# Patient Record
Sex: FEMALE | Race: WHITE | NOT HISPANIC OR LATINO | Employment: PART TIME | URBAN - METROPOLITAN AREA
[De-identification: names, ages, dates, MRNs, and addresses within clinical notes are randomized per-mention and may not be internally consistent; named-entity substitution may affect disease eponyms.]

---

## 2017-03-23 ENCOUNTER — TELEPHONE (OUTPATIENT)
Dept: OBGYN | Facility: CLINIC | Age: 42
End: 2017-03-23

## 2017-03-23 ENCOUNTER — OFFICE VISIT (OUTPATIENT)
Dept: FAMILY MEDICINE | Facility: CLINIC | Age: 42
End: 2017-03-23

## 2017-03-23 VITALS
OXYGEN SATURATION: 100 % | WEIGHT: 113 LBS | BODY MASS INDEX: 20.67 KG/M2 | TEMPERATURE: 98 F | HEART RATE: 107 BPM | DIASTOLIC BLOOD PRESSURE: 96 MMHG | SYSTOLIC BLOOD PRESSURE: 137 MMHG

## 2017-03-23 DIAGNOSIS — N30.00 ACUTE CYSTITIS WITHOUT HEMATURIA: Primary | ICD-10-CM

## 2017-03-23 DIAGNOSIS — R30.0 DYSURIA: ICD-10-CM

## 2017-03-23 LAB
BILIRUBIN UR: NEGATIVE
BLOOD UR: ABNORMAL
GLUCOSE URINE: NEGATIVE
KETONES UR QL: NEGATIVE
LEUKOCYTE ESTERASE UR: ABNORMAL
NITRITE UR QL STRIP: NEGATIVE
PH UR STRIP: 6 [PH] (ref 5–7)
PROTEIN UR: NEGATIVE
SP GR UR STRIP: 1.01
UROBILINOGEN UR STRIP-ACNC: ABNORMAL

## 2017-03-23 RX ORDER — SULFAMETHOXAZOLE/TRIMETHOPRIM 800-160 MG
1 TABLET ORAL 2 TIMES DAILY
Qty: 6 TABLET | Refills: 0 | Status: SHIPPED | OUTPATIENT
Start: 2017-03-23 | End: 2017-04-06

## 2017-03-23 RX ORDER — PHENAZOPYRIDINE HYDROCHLORIDE 200 MG/1
200 TABLET, FILM COATED ORAL 3 TIMES DAILY PRN
Qty: 6 TABLET | Refills: 0 | Status: SHIPPED | OUTPATIENT
Start: 2017-03-23 | End: 2017-05-18

## 2017-03-23 ASSESSMENT — PAIN SCALES - GENERAL: PAINLEVEL: NO PAIN (0)

## 2017-03-23 NOTE — PATIENT INSTRUCTIONS
You have been prescribed an antibiotic to treat a bacterial infection. Watch for signs of allergic reaction including swelling around the mouth or eyes and the development of a rash.  If you develop any of these symptoms, stop your medication, take a benadryl (25-50 mg) and give our office a call. Consider probiotic therapy to decrease the possibility of diarrhea associated with antibiotic use.    Drink lots of fluids.    Please feel free to call our office if you have any questions.  Thank you for allowing me to participate in your care.  It was my pleasure meeting you.  Ashley Sanchez PA-C    Urinary Tract Infection in Women   What is a urinary tract infection?   A urinary tract infection (UTI) is a bacterial infection in the urinary tract. The urinary tract includes the kidneys, ureters, bladder, and urethra. Any or all of these parts of the urinary tract can become infected. If left untreated, UTI can cause permanent damage to the bladder and kidneys.   How does it occur?   Urinary tract infection is the result of bacteria that multiply and spread. These bacteria can cause:   cystitis (bladder infection)   pyelonephritis (kidney infection)   urethritis (inflammation of the urethra, the tube that drains urine from the bladder).   Normally the urine does not have any bacteria or any other organisms in it. Bacteria that cause UTI often spread from the rectum or vagina to the urethra and then to the bladder or kidneys. Urinary tract infection is more common in women because the urethra is short. This makes it easy for bacteria to move up to the bladder and kidneys. Sometimes bacteria can also spread from another part of the body through the bloodstream to the urinary tract.   An obstruction in the urinary tract, such as a stone, can keep the urine from getting to the bladder and lead to an infection. Urinary tract infection is more likely to occur if you have diabetes or another disorder that affects the immune  system. Many women seem to have more infections after sexual intercourse. As you get older, it can be harder to empty the bladder completely. If the urine stays in the bladder, the few bacteria that get into the bladder can start growing and start an infection. Often the cause of UTI is not known.   Urinary tract infection is more likely to occur in women who:   are newly sexually active or have a new sexual partner   are past menopause   are pregnant   have a history of diabetes, sickle-cell anemia, stroke, kidney stones, or any illness that causes the bladder to be paralyzed or unable to empty completely.   What are the symptoms?   The symptoms of UTI may include:   urinating more often   feeling an urgent need to urinate   pain or discomfort (burning) when you urinate   strong-smelling urine   pain in the lower pelvis, stomach, lower back, or side   urine that looks cloudy or reddish   shaking chills   fever   sweats   nausea and vomiting   leaking of urine (incontinence)   change in amount of urine, either more or less   pain during sexual intercourse.   How is it diagnosed?   Your health care provider will review your symptoms and examine you. The exam may include a pelvic exam. Your provider will check for tenderness of the bladder or kidney area of your back. A sample of your urine may be tested for bacteria and pus.   For repeated infections or symptoms that continue after treatment, your health care provider may suggest:   An intravenous pyelogram (IVP). An IVP is a special type of x-ray of the kidneys, ureters, and bladder.   An ultrasound scan to look at the urinary tract.   A cystoscopy. This is an exam of the inside of the bladder with a small lighted instrument. It is usually performed by a specialist called a urologist.   How is it treated?   UTIs are treated with an antibiotic. For uncomplicated urinary tract infections you may take a single dose of an antibiotic or you may take an antibiotic for 3  to 10 days. For chronic infections or infections that keep coming back, it may be necessary to take the antibiotics for a longer time. Take all the medicine your health care provider prescribes, even after the symptoms go away. If you stop taking your medicine before the scheduled end of treatment, the infection may come back.   Your health care provider can prescribe a medicine called Pyridium to relieve painful urination while the antibiotics are treating the infection. (This medicine turns your urine a dark orange color.)   If the infection is not treated, your kidneys may be damaged or the infection may spread to your blood. If the infection does spread to the blood, it can be fatal. If you have a severe kidney infection, you will be given IV antibiotics through your veins and you may have to stay in the hospital for a few days.   How long will the effects last?   Usually the symptoms of the infection stop in 2 to 3 days.   How can I take care of myself?   Follow your health care provider's treatment.   Drink more fluids, especially water, to help flush the bacteria from your system.   If you have a fever:   Rest if you have a fever above 100 degrees F (38 degrees C). After your temperature falls below 100 degrees F (38 degrees C), you may be more active.   Take aspirin or acetaminophen to control the fever.   Keep a daily record of your temperature.   A hot water bottle or an electric heating pad on a low setting can help relieve cramps or lower abdominal or back pain.   Soaking in a tub for 20 to 30 minutes may help relieve any back or abdominal pain.   If your symptoms continue for 2 or more days, or if you develop new symptoms, call your health care provider.   How can I help prevent a urinary tract infection?   You can help prevent UTIs if you:   Drink plenty of water and other noncaffeinated drinks. (Caffeine can cause the body to lose fluids.)   Do not delay urinating when you feel the need to urinate.    Use good hygiene when you use the toilet. For example, wipe from front to back to keep rectal bacteria from getting into the vagina and urethra.   Avoid using irritating cosmetics or chemicals in the area of the vagina and urethra (such as strong soaps, feminine hygiene sprays or douches, or scented napkins or panty liners).   Urinate soon after sexual intercourse.   Keep your genital area clean.   Empty your bladder completely when you urinate.   Wear all-cotton or cotton-crotch underwear and pantyhose. Change underwear and pantyhose every day.   Copyright   2006 [employer] and/or one of its subsidiaries. All Rights Reserved.

## 2017-03-23 NOTE — MR AVS SNAPSHOT
After Visit Summary   3/23/2017    Kitty Ibarra    MRN: [id number]           Patient Information     Date Of Birth          1975        Visit Information        Provider Department      3/23/2017 10:20 AM Marii Sanchez PA-C [facility]        Today's Diagnoses     Acute cystitis without hematuria    -  1    Dysuria          Care Instructions    You have been prescribed an antibiotic to treat a bacterial infection. Watch for signs of allergic reaction including swelling around the mouth or eyes and the development of a rash.  If you develop any of these symptoms, stop your medication, take a benadryl (25-50 mg) and give our office a call. Consider probiotic therapy to decrease the possibility of diarrhea associated with antibiotic use.    Drink lots of fluids.    Please feel free to call our office if you have any questions.  Thank you for allowing me to participate in your care.  It was my pleasure meeting you.  Ashley Sanchez PA-C    Urinary Tract Infection in Women   What is a urinary tract infection?   A urinary tract infection (UTI) is a bacterial infection in the urinary tract. The urinary tract includes the kidneys, ureters, bladder, and urethra. Any or all of these parts of the urinary tract can become infected. If left untreated, UTI can cause permanent damage to the bladder and kidneys.   How does it occur?   Urinary tract infection is the result of bacteria that multiply and spread. These bacteria can cause:   cystitis (bladder infection)   pyelonephritis (kidney infection)   urethritis (inflammation of the urethra, the tube that drains urine from the bladder).   Normally the urine does not have any bacteria or any other organisms in it. Bacteria that cause UTI often spread from the rectum or vagina to the urethra and then to the bladder or kidneys. Urinary tract infection is more common in women because the urethra is short. This makes it easy for bacteria to  move up to the bladder and kidneys. Sometimes bacteria can also spread from another part of the body through the bloodstream to the urinary tract.   An obstruction in the urinary tract, such as a stone, can keep the urine from getting to the bladder and lead to an infection. Urinary tract infection is more likely to occur if you have diabetes or another disorder that affects the immune system. Many women seem to have more infections after sexual intercourse. As you get older, it can be harder to empty the bladder completely. If the urine stays in the bladder, the few bacteria that get into the bladder can start growing and start an infection. Often the cause of UTI is not known.   Urinary tract infection is more likely to occur in women who:   are newly sexually active or have a new sexual partner   are past menopause   are pregnant   have a history of diabetes, sickle-cell anemia, stroke, kidney stones, or any illness that causes the bladder to be paralyzed or unable to empty completely.   What are the symptoms?   The symptoms of UTI may include:   urinating more often   feeling an urgent need to urinate   pain or discomfort (burning) when you urinate   strong-smelling urine   pain in the lower pelvis, stomach, lower back, or side   urine that looks cloudy or reddish   shaking chills   fever   sweats   nausea and vomiting   leaking of urine (incontinence)   change in amount of urine, either more or less   pain during sexual intercourse.   How is it diagnosed?   Your health care provider will review your symptoms and examine you. The exam may include a pelvic exam. Your provider will check for tenderness of the bladder or kidney area of your back. A sample of your urine may be tested for bacteria and pus.   For repeated infections or symptoms that continue after treatment, your health care provider may suggest:   An intravenous pyelogram (IVP). An IVP is a special type of x-ray of the kidneys, ureters, and bladder.    An ultrasound scan to look at the urinary tract.   A cystoscopy. This is an exam of the inside of the bladder with a small lighted instrument. It is usually performed by a specialist called a urologist.   How is it treated?   UTIs are treated with an antibiotic. For uncomplicated urinary tract infections you may take a single dose of an antibiotic or you may take an antibiotic for 3 to 10 days. For chronic infections or infections that keep coming back, it may be necessary to take the antibiotics for a longer time. Take all the medicine your health care provider prescribes, even after the symptoms go away. If you stop taking your medicine before the scheduled end of treatment, the infection may come back.   Your health care provider can prescribe a medicine called Pyridium to relieve painful urination while the antibiotics are treating the infection. (This medicine turns your urine a dark orange color.)   If the infection is not treated, your kidneys may be damaged or the infection may spread to your blood. If the infection does spread to the blood, it can be fatal. If you have a severe kidney infection, you will be given IV antibiotics through your veins and you may have to stay in the hospital for a few days.   How long will the effects last?   Usually the symptoms of the infection stop in 2 to 3 days.   How can I take care of myself?   Follow your health care provider's treatment.   Drink more fluids, especially water, to help flush the bacteria from your system.   If you have a fever:   Rest if you have a fever above 100 degrees F (38 degrees C). After your temperature falls below 100 degrees F (38 degrees C), you may be more active.   Take aspirin or acetaminophen to control the fever.   Keep a daily record of your temperature.   A hot water bottle or an electric heating pad on a low setting can help relieve cramps or lower abdominal or back pain.   Soaking in a tub for 20 to 30 minutes may help relieve any  back or abdominal pain.   If your symptoms continue for 2 or more days, or if you develop new symptoms, call your health care provider.   How can I help prevent a urinary tract infection?   You can help prevent UTIs if you:   Drink plenty of water and other noncaffeinated drinks. (Caffeine can cause the body to lose fluids.)   Do not delay urinating when you feel the need to urinate.   Use good hygiene when you use the toilet. For example, wipe from front to back to keep rectal bacteria from getting into the vagina and urethra.   Avoid using irritating cosmetics or chemicals in the area of the vagina and urethra (such as strong soaps, feminine hygiene sprays or douches, or scented napkins or panty liners).   Urinate soon after sexual intercourse.   Keep your genital area clean.   Empty your bladder completely when you urinate.   Wear all-cotton or cotton-crotch underwear and pantyhose. Change underwear and pantyhose every day.   Copyright   2006 [employer] and/or one of its subsidiaries. All Rights Reserved.             Follow-ups after your visit        Your next 10 appointments already scheduled     May 18, 2017 11:00 AM CDT   Annual Visit with Omaira Siddiqui MD   [facility] ([facility])    [facility] [address] [facility] [address] [facility] [address] [phone]              Who to contact     Please call your clinic at [phone] to:    Ask questions about your health    Make or cancel appointments    Discuss your medicines    Learn about your test results    Speak to your doctor   If you have compliments or concerns about an experience at your clinic, or if you wish to file a complaint, please contact [facility] Patient Relations at [phone] or email us at [email or web address]         Additional Information About Your Visit        MyChart Information     [employer] gives you secure  access to your electronic health record. If you see a primary care provider, you can also send messages to your care team and make appointments. If you have questions, please call your primary care clinic.  If you do not have a primary care provider, please call [phone] and they will assist you.      [employer] is an electronic gateway that provides easy, online access to your medical records. With [employer], you can request a clinic appointment, read your test results, renew a prescription or communicate with your care team.     To access your existing account, please contact your [facility] or call [phone] for assistance.        Care EveryWhere ID     This is your Care EveryWhere ID. This could be used by other organizations to access your [place] medical records  [id number]        Your Vitals Were     Pulse Temperature Last Period Pulse Oximetry BMI (Body Mass Index)       107 98  F (36.7  C) (Oral) 03/01/2017 100% 20.67 kg/m2        Blood Pressure from Last 3 Encounters:   03/23/17 (!) 137/96   11/11/14 138/88   10/13/14 122/77    Weight from Last 3 Encounters:   03/23/17 113 lb (51.3 kg)   02/10/15 112 lb (50.8 kg)   11/11/14 107 lb (48.5 kg)              We Performed the Following     Urinalysis ([place])     Urine Culture Aerobic Bacterial          Today's Medication Changes          These changes are accurate as of: 3/23/17 10:53 AM.  If you have any questions, ask your nurse or doctor.               Start taking these medicines.        Dose/Directions    phenazopyridine 200 MG tablet   Commonly known as:  PYRIDIUM   Used for:  Acute cystitis without hematuria   Started by:  Marii Sanchez PA-C        Dose:  200 mg   Take 1 tablet (200 mg) by mouth 3 times daily as needed for irritation   Quantity:  6 tablet   Refills:  0       sulfamethoxazole-trimethoprim 800-160 MG per tablet   Commonly known as:  BACTRIM DS/SEPTRA DS   Used for:  Acute cystitis without  hematuria   Started by:  Marii Sanchez PA-C        Dose:  1 tablet   Take 1 tablet by mouth 2 times daily   Quantity:  6 tablet   Refills:  0            Where to get your medicines      These medications were sent to [place] [address] [facility] [address]  [address], [place] [address]     Phone:  [phone]     phenazopyridine 200 MG tablet    sulfamethoxazole-trimethoprim 800-160 MG per tablet                Primary Care Provider Office Phone # Fax #    Noel Mayfield [phone]       [facility]  [address] [facility] [address]  [facility] [address]        Thank you!     Thank you for choosing [facility]  for your care. Our goal is always to provide you with excellent care. Hearing back from our patients is one way we can continue to improve our services. Please take a few minutes to complete the written survey that you may receive in the mail after your visit with us. Thank you!             Your Updated Medication List - Protect others around you: Learn how to safely use, store and throw away your medicines at www.disposemymeds.org.          This list is accurate as of: 3/23/17 10:53 AM.  Always use your most recent med list.                   Brand Name Dispense Instructions for use    phenazopyridine 200 MG tablet    PYRIDIUM    6 tablet    Take 1 tablet (200 mg) by mouth 3 times daily as needed for irritation       sulfamethoxazole-trimethoprim 800-160 MG per tablet    BACTRIM DS/SEPTRA DS    6 tablet    Take 1 tablet by mouth 2 times daily       vitamin D 2000 UNITS Caps      Take 2 capsules by mouth daily.

## 2017-03-23 NOTE — NURSING NOTE
Chief Complaint   Patient presents with     Dysuria     Frequency     41 year old      Blood pressure (!) 137/96, pulse 107, temperature 98  F (36.7  C), temperature source Oral, weight 113 lb (51.3 kg), last menstrual period 03/01/2017, SpO2 100 %. Body mass index is 20.67 kg/(m^2).    Wt Readings from Last 2 Encounters:   03/23/17 113 lb (51.3 kg)   02/10/15 112 lb (50.8 kg)     BP Readings from Last 3 Encounters:   03/23/17 (!) 137/96   11/11/14 138/88   10/13/14 122/77       Patient Active Problem List   Diagnosis     Skin exam, screening for cancer     Myopia     Vitamin D deficiency       Current Outpatient Prescriptions   Medication Sig Dispense Refill     Cholecalciferol (VITAMIN D) 2000 UNITS CAPS Take 2 capsules by mouth daily.         Social History   Substance Use Topics     Smoking status: Never Smoker     Smokeless tobacco: Never Used     Alcohol use No         There are no preventive care reminders to display for this patient.    JENNIFER NGUYEN CMA  March 23, 2017 10:30 AM

## 2017-03-23 NOTE — PROGRESS NOTES
SUBJECTIVE:  Kitty Ibarra is a 41 year old female who  presents today for a possible UTI. Symptoms of dysuria, urgency, frequency and burning have been going on for 2day(s).  Hematuria no.  Sudden onset and moderate symptoms.  There is no history of fever, chills, nausea or vomiting.  No history of vaginal or penile discharge. This patient does not have a history of urinary tract infections. Patient denies long duration, rigors, flank pain and temperature > 101 degrees F. or vaginal discharge     Patient Active Problem List    Diagnosis Date Noted     Vitamin D deficiency 11/11/2014     Priority: Medium     Myopia 10/14/2014     Priority: Medium     Skin exam, screening for cancer 02/05/2013     Priority: Medium       Past Medical History:   Diagnosis Date     NO ACTIVE PROBLEMS      Current Outpatient Prescriptions   Medication Sig Dispense Refill     Cholecalciferol (VITAMIN D) 2000 UNITS CAPS Take 2 capsules by mouth daily.       Social History   Substance Use Topics     Smoking status: Never Smoker     Smokeless tobacco: Never Used     Alcohol use No       ROS:   CONSTITUTIONAL:NEGATIVE for fever, chills, change in weight  RESP:NEGATIVE for significant cough or SOB  CV: NEGATIVE for chest pain, palpitations or peripheral edema  GI: NEGATIVE for nausea, abdominal pain, heartburn, or change in bowel habits  : normal menstrual cycles using condoms for contraception  MS: No back pain    OBJECTIVE:  BP (!) 137/96  Pulse 107  Temp 98  F (36.7  C) (Oral)  Wt 113 lb (51.3 kg)  LMP 03/01/2017  SpO2 100%  BMI 20.67 kg/m2  GENERAL APPEARANCE: healthy, alert and no distress  RESP: lungs clear to auscultation - no rales, rhonchi or wheezes  CV: regular rates and rhythm, normal S1 S2, no murmur noted  ABDOMEN:  soft, nontender, no HSM or masses and bowel sounds normal  BACK: No CVA tenderness  SKIN: no suspicious lesions or rashes    ASSESSMENT:   Lower, uncomplicated urinary tract  infection.    PLAN:  Drink plenty of fluids.  Prevention and treatment of UTI's discussed.Signs and symptoms of pyelonephritis mentioned.  Follow up with primary care provider if not improving.  See patient instructions.

## 2017-03-24 LAB
BACTERIA SPEC CULT: ABNORMAL
Lab: ABNORMAL
MICRO REPORT STATUS: ABNORMAL
MICROORGANISM SPEC CULT: ABNORMAL
SPECIMEN SOURCE: ABNORMAL

## 2017-04-06 ENCOUNTER — TELEPHONE (OUTPATIENT)
Dept: FAMILY MEDICINE | Facility: CLINIC | Age: 42
End: 2017-04-06

## 2017-04-06 ENCOUNTER — TELEPHONE (OUTPATIENT)
Dept: NURSING | Facility: CLINIC | Age: 42
End: 2017-04-06

## 2017-04-06 DIAGNOSIS — N30.00 ACUTE CYSTITIS WITHOUT HEMATURIA: Primary | ICD-10-CM

## 2017-04-06 RX ORDER — NITROFURANTOIN 25; 75 MG/1; MG/1
100 CAPSULE ORAL 2 TIMES DAILY
Qty: 14 CAPSULE | Refills: 0 | Status: SHIPPED | OUTPATIENT
Start: 2017-04-06 | End: 2017-04-07

## 2017-04-06 NOTE — TELEPHONE ENCOUNTER
"Kitty called the nurse triage line 04/16/2017 @ 7750. Kitty was diagnosed with a UTI about 2 weeks ago. Prescribed 3 days of Bactrim, which she took. Kitty thinks her UTI may have returned. She says that she had a fever 2 days ago. Temp was not checked but had chills. Also frequency to void, and not \"feeling like herself\". No pain at this time. Symptoms improved for some time, but have returned in the last few days. She is asking if she needs to be seen again or if she could have another antibiotic prescribed. Please call Kitty at [phone].  Thank you.     Mi Moreno RN  FNA    Routed to: LAUREN Valadez and DUSTY nelson  "

## 2017-04-06 NOTE — TELEPHONE ENCOUNTER
"Call Type: Triage Call    Presenting Problem: Kitty was diagnosed with a UTI about 2 weeks  ago. Prescribed 3 days of Bactrim, which she took. Kitty thinks her  UTI may have returned. She says that she had a fever 2 days ago.  Temp was not checked but had chills. Also frequency to void, and not  \"feeling like herself\". No pain at this time. Symptoms improved for  some time, but have returned in the last few days. She is asking for  a message sent to PCP.  Triage Note:  Guideline Title: Information Only Call; No Symptom Triage (Adult)  Recommended Disposition: Provide Information or Advice Only  Original Inclination: Wanted to speak with a nurse  Override Disposition:  Intended Action: Follow advice given  Physician Contacted: No  Requesting information regarding scheduled exam, test or procedure; no triage  required. Information provided from approved resources or clinical experience. ?  YES  Requesting regular office appointment ? NO  Sign(s) or symptom(s) associated with a diagnosed condition or with a new illness  ? NO  Requesting information about provider, services or community resources ? NO  Call back to complete assessment/clarification of information from prior caller to  complete triage ? NO  Requesting information and provider is best resource; no triage required. ? NO  Caller not with patient and is unable to provide clinical information about  patient to facilitate triage. ? NO  Requesting provider information for recently scheduled test, procedure; no triage  required. Needed information not available per approved resources or clinical  experience. ? NO  Requesting information not available per approved reference or clinical  experience; no triage required. ? NO  Physician Instructions:  Care Advice:  "

## 2017-04-06 NOTE — TELEPHONE ENCOUNTER
Attempted call to patient.  Message lef for her to check her MyChart messages.  Ashley Sanchez PA-C

## 2017-04-07 DIAGNOSIS — N30.00 ACUTE CYSTITIS WITHOUT HEMATURIA: ICD-10-CM

## 2017-04-07 RX ORDER — NITROFURANTOIN 25; 75 MG/1; MG/1
100 CAPSULE ORAL 2 TIMES DAILY
Qty: 14 CAPSULE | Refills: 0 | Status: SHIPPED | OUTPATIENT
Start: 2017-04-07 | End: 2017-05-18

## 2017-05-15 ASSESSMENT — ANXIETY QUESTIONNAIRES
GAD7 TOTAL SCORE: 1
GAD7 TOTAL SCORE: 1
7. FEELING AFRAID AS IF SOMETHING AWFUL MIGHT HAPPEN: 0 = NOT AT ALL

## 2017-05-15 ASSESSMENT — ENCOUNTER SYMPTOMS: SKIN CHANGES: 0

## 2017-05-16 ASSESSMENT — ANXIETY QUESTIONNAIRES: GAD7 TOTAL SCORE: 1

## 2017-05-18 ENCOUNTER — OFFICE VISIT (OUTPATIENT)
Dept: OBGYN | Facility: CLINIC | Age: 42
End: 2017-05-18
Attending: OBSTETRICS & GYNECOLOGY
Payer: COMMERCIAL

## 2017-05-18 VITALS
BODY MASS INDEX: 20.27 KG/M2 | HEART RATE: 106 BPM | DIASTOLIC BLOOD PRESSURE: 79 MMHG | SYSTOLIC BLOOD PRESSURE: 125 MMHG | WEIGHT: 110.8 LBS

## 2017-05-18 DIAGNOSIS — Z00.00 VISIT FOR PREVENTIVE HEALTH EXAMINATION: Primary | ICD-10-CM

## 2017-05-18 DIAGNOSIS — R21 RASH AND NONSPECIFIC SKIN ERUPTION: ICD-10-CM

## 2017-05-18 RX ORDER — HYDROCORTISONE 2.5 %
CREAM (GRAM) TOPICAL 2 TIMES DAILY
Qty: 15 G | Refills: 1 | Status: SHIPPED | OUTPATIENT
Start: 2017-05-18 | End: 2017-07-27

## 2017-05-18 ASSESSMENT — PAIN SCALES - GENERAL: PAINLEVEL: NO PAIN (0)

## 2017-05-18 NOTE — PROGRESS NOTES
Progress Note    SUBJECTIVE:  Kitty Ibarra is an 41 year old, , who requests an Annual Preventive Exam.   She is doing well today.  Her periods are regular.  She is happy with condoms for contraception, her  is considering a vasectomy. Her only concern is an itchy area of skin on the back of her wrist.      Concerns today include:     Menstrual History:  Menstrual History 10/25/2012 10/13/2014 3/23/2017   LAST MENSTRUAL PERIOD 10/14/2012 10/10/2014 3/1/2017       Last    Lab Results   Component Value Date    PAP NIL 10/13/2014     History of abnormal Pap smear: NO - age 30-65 PAP every 5 years with negative HPV co-testing recommended    Last No results found for: HPV16  Last No results found for: HPV18  Last No results found for: HRHPV    Mammogram current:  NA  Last Mammogram:   No results found.     Last Colonoscopy:  No results found for this or any previous visit.      HISTORY:    No current outpatient prescriptions on file prior to visit.  No current facility-administered medications on file prior to visit.   Allergies   Allergen Reactions     Nkda [No Known Drug Allergies]      Immunization History   Administered Date(s) Administered     Hepatitis A Vac Ped/Adol-2 Dose 1996, 1997     Hepatitis B 1996, 1996, 1997     Influenza (H1N1) 2009     Influenza (IIV3) 10/25/2012, 10/16/2013, 2013, 2015     Rabavert 1996, 1996, 1996     TD (ADULT, 7+) 2014     Tdap (Adacel,Boostrix) 2006     Typhoid IM 2005, 2014     Yellow Fever 1996, 2014       Obstetric History       T0      TAB0   SAB0   E0   M0   L2      Past Medical History:   Diagnosis Date     NO ACTIVE PROBLEMS      Past Surgical History:   Procedure Laterality Date     BIOPSY BREAST  1991    benign      SECTION  ,      MYOMECTOMY UTERUS  2006    Uterine fibroids     Family History   Problem  Relation Age of Onset     Glaucoma Mother      Colon Cancer Paternal Grandmother      Abdominal Aortic Aneurysm Maternal Grandmother      Alzheimer Disease Maternal Grandfather      HEART DISEASE Maternal Grandfather      HEART DISEASE Paternal Grandfather      Stomach Cancer Paternal Grandfather      Family History Negative No family hx of      Social History     Social History     Marital status:      Spouse name: N/A     Number of children: N/A     Years of education: N/A     Occupational History      Unknown     law firm-pt time     Social History Main Topics     Smoking status: Never Smoker     Smokeless tobacco: Never Used     Alcohol use No     Drug use: No     Sexual activity: Yes     Partners: Male     Birth control/ protection: Condom     Other Topics Concern      Service No     Blood Transfusions No     Caffeine Concern No     Occupational Exposure No     Hobby Hazards No     Sleep Concern No     Stress Concern Yes     Weight Concern Yes     Special Diet No     Back Care No     Exercise Yes     Bike Helmet No     Seat Belt No     Self-Exams No     Social History Narrative    How much exercise per week? Yoga regularly    How much calcium per day? supplement       How much caffeine per day? none    How much vitamin D per day? Supplement foods    Do you/your family wear seatbelts?  Yes    Do you/your family use safety helmets? Yes    Do you/your family use sunscreen? Yes    Do you/your family keep firearms in the home? No    Do you/your family have a smoke detector(s)? Yes        Do you feel safe in your home? Yes    Has anyone ever touched you in an unwanted manner? No     Explain     Kayla Ackerman CMA    10/13/2014               ROS  [unfilled]  No flowsheet data found.  LIN-7 SCORE 5/15/2017   Total Score 1 (minimal anxiety)         EXAM:  Blood pressure 125/79, pulse 106, weight 50.3 kg (110 lb 12.8 oz). Body mass index is 20.27 kg/(m^2).  General - pleasant female in no acute  distress.  Skin - extensor surface of wrist with a 3 x 3 cm area of slightly thickened, erythematous skin c/w contact dermatitis   EENT-  PERRLA, euthyroid with out palpable nodules  Neck - supple without lymphadenopathy.  Lungs - clear to auscultation bilaterally.  Heart - regular rate and rhythm without murmur.  Abdomen - well healed surgical incisions, soft, nontender, nondistended, no masses or organomegaly noted.  Musculoskeletal - no gross deformities.  Neurological - normal strength, sensation, and mental status.    Breast Exam:  Breast: Without visible skin changes. No dimpling or lesions seen.   Breasts supple, non-tender with palpation, no dominant mass, nodularity, or nipple discharge noted bilaterally. Axillary nodes negative.      Pelvic Exam:  EG/BUS: Normal genital architecture without lesions, erythema or abnormal secretions Bartholin's, Urethra, [place]'s normal   Urethral meatus: normal   Urethra: no masses, tenderness, or scarring   Bladder: no masses or tenderness   Vagina: moist, pink, rugae with creamy, white and odorless  secretions  Cervix: Nulliparous, and no lesions  Uterus: anteverted,  and small, smooth, firm, mobile w/o pain  Adnexa: Within normal limits and No masses, nodularity, tenderness  Rectum:anus normal       ASSESSMENT:  Encounter Diagnoses   Name Primary?     Visit for preventive health examination Yes     Rash and nonspecific skin eruption         PLAN:    Reviewed breast cancer screening guidelines.  She declines a mammogram this year, may start next year.     Orders Placed This Encounter   Medications     cholecalciferol 2000 UNITS CAPS     Sig: Take 1 tablet by mouth daily     hydrocortisone 2.5 % cream     Sig: Apply topically 2 times daily     Dispense:  15 g     Refill:  1       Additional teaching done at this visit regarding birth control.    Return to clinic in one year.  Follow-up as needed.    Omaira Siddiqui MD, FACOG          Answers for HPI/ROS submitted by the  patient on 5/15/2017, reviewed on 5/18/17  LIN 7 TOTAL SCORE: 1  Q1: Little interest or pleasure in doing things: 0=Not at all  Q2: Feeling down, depressed or hopeless: 0=Not at all  PHQ-2 Score: 0  General Symptoms: No  Skin Symptoms: Yes  HENT Symptoms: No  EYE SYMPTOMS: No  HEART SYMPTOMS: No  LUNG SYMPTOMS: No  INTESTINAL SYMPTOMS: No  URINARY SYMPTOMS: No  GYNECOLOGIC SYMPTOMS: No  BREAST SYMPTOMS: No  SKELETAL SYMPTOMS: No  BLOOD SYMPTOMS: No  NERVOUS SYSTEM SYMPTOMS: No  MENTAL HEALTH SYMPTOMS: No  Changes in hair: No  Changes in moles/birth marks: No  Itching: Yes

## 2017-05-18 NOTE — LETTER
2017       RE: Kitty Ibarra  [address]  [place] [address]     Dear Colleague,    Thank you for referring your patient, Kitty Ibarra, to the [facility] at [facility]. Please see a copy of my visit note below.      Progress Note    SUBJECTIVE:  Kitty Ibarra is an 41 year old, , who requests an Annual Preventive Exam.   She is doing well today.  Her periods are regular.  She is happy with condoms for contraception, her  is considering a vasectomy. Her only concern is an itchy area of skin on the back of her wrist.    Concerns today include:     Menstrual History:  Menstrual History 10/25/2012 10/13/2014 3/23/2017   LAST MENSTRUAL PERIOD 10/14/2012 10/10/2014 3/1/2017       Last    Lab Results   Component Value Date    PAP NIL 10/13/2014     History of abnormal Pap smear: NO - age 30-65 PAP every 5 years with negative HPV co-testing recommended    Last No results found for: HPV16  Last No results found for: HPV18  Last No results found for: HRHPV    Mammogram current:  NA  Last Mammogram:   No results found.     Last Colonoscopy:  No results found for this or any previous visit.    HISTORY:  No current outpatient prescriptions on file prior to visit.  No current facility-administered medications on file prior to visit.   Allergies   Allergen Reactions     Nkda [No Known Drug Allergies]      Immunization History   Administered Date(s) Administered     Hepatitis A Vac Ped/Adol-2 Dose 1996, 1997     Hepatitis B 1996, 1996, 1997     Influenza (H1N1) 2009     Influenza (IIV3) 10/25/2012, 10/16/2013, 2013, 2015     Rabavert 1996, 1996, 1996     TD (ADULT, 7+) 2014     Tdap (Adacel,Boostrix) 2006     Typhoid IM 2005, 2014     Yellow Fever 1996, 2014       Obstetric History       T0   P2    A0   TAB0   SAB0   E0   M0   L2      Past Medical History:   Diagnosis Date     NO ACTIVE PROBLEMS      Past Surgical History:   Procedure Laterality Date     BIOPSY BREAST  1991    benign      SECTION  ,      MYOMECTOMY UTERUS  2006    Uterine fibroids     Family History   Problem Relation Age of Onset     Glaucoma Mother      Colon Cancer Paternal Grandmother      Abdominal Aortic Aneurysm Maternal Grandmother      Alzheimer Disease Maternal Grandfather      HEART DISEASE Maternal Grandfather      HEART DISEASE Paternal Grandfather      Stomach Cancer Paternal Grandfather      Family History Negative No family hx of      Social History     Social History     Marital status:      Spouse name: N/A     Number of children: N/A     Years of education: N/A     Occupational History      Unknown     law firm-pt time     Social History Main Topics     Smoking status: Never Smoker     Smokeless tobacco: Never Used     Alcohol use No     Drug use: No     Sexual activity: Yes     Partners: Male     Birth control/ protection: Condom     Other Topics Concern      Service No     Blood Transfusions No     Caffeine Concern No     Occupational Exposure No     Hobby Hazards No     Sleep Concern No     Stress Concern Yes     Weight Concern Yes     Special Diet No     Back Care No     Exercise Yes     Bike Helmet No     Seat Belt No     Self-Exams No     Social History Narrative    How much exercise per week? Yoga regularly    How much calcium per day? supplement       How much caffeine per day? none    How much vitamin D per day? Supplement foods    Do you/your family wear seatbelts?  Yes    Do you/your family use safety helmets? Yes    Do you/your family use sunscreen? Yes    Do you/your family keep firearms in the home? No    Do you/your family have a smoke detector(s)? Yes        Do you feel safe in your home? Yes    Has anyone ever touched you in an unwanted manner? No     Explain      Kayla Robertszack, [facility]    10/13/2014               ROS  [unfilled]  No flowsheet data found.  LIN-7 SCORE 5/15/2017   Total Score 1 (minimal anxiety)     EXAM:  Blood pressure 125/79, pulse 106, weight 50.3 kg (110 lb 12.8 oz). Body mass index is 20.27 kg/(m^2).  General - pleasant female in no acute distress.  Skin - extensor surface of wrist with a 3 x 3 cm area of slightly thickened, erythematous skin c/w contact dermatitis   EENT-  PERRLA, euthyroid with out palpable nodules  Neck - supple without lymphadenopathy.  Lungs - clear to auscultation bilaterally.  Heart - regular rate and rhythm without murmur.  Abdomen - well healed surgical incisions, soft, nontender, nondistended, no masses or organomegaly noted.  Musculoskeletal - no gross deformities.  Neurological - normal strength, sensation, and mental status.  Breast Exam:  Breast: Without visible skin changes. No dimpling or lesions seen.   Breasts supple, non-tender with palpation, no dominant mass, nodularity, or nipple discharge noted bilaterally. Axillary nodes negative.      Pelvic Exam:  EG/BUS: Normal genital architecture without lesions, erythema or abnormal secretions Bartholin's, Urethra, [place]'s normal   Urethral meatus: normal   Urethra: no masses, tenderness, or scarring   Bladder: no masses or tenderness   Vagina: moist, pink, rugae with creamy, white and odorless  secretions  Cervix: Nulliparous, and no lesions  Uterus: anteverted,  and small, smooth, firm, mobile w/o pain  Adnexa: Within normal limits and No masses, nodularity, tenderness  Rectum:anus normal       ASSESSMENT:  Encounter Diagnoses   Name Primary?     Visit for preventive health examination Yes     Rash and nonspecific skin eruption         PLAN:    Reviewed breast cancer screening guidelines.  She declines a mammogram this year, may start next year.     Orders Placed This Encounter   Medications     cholecalciferol 2000 UNITS CAPS     Sig: Take 1 tablet by mouth daily      hydrocortisone 2.5 % cream     Sig: Apply topically 2 times daily     Dispense:  15 g     Refill:  1       Additional teaching done at this visit regarding birth control.    Return to clinic in one year.  Follow-up as needed.    Omaira Siddiqui MD, FACOG

## 2017-05-18 NOTE — MR AVS SNAPSHOT
After Visit Summary   5/18/2017    Kitty Ibarra    MRN: [id number]           Patient Information     Date Of Birth          1975        Visit Information        Provider Department      5/18/2017 11:00 AM Omaira Siddiqui MD [facility]        Today's Diagnoses     Visit for preventive health examination    -  1    Rash and nonspecific skin eruption          Care Instructions      PREVENTIVE HEALTH RECOMMENDATIONS:   Most women need a yearly breast and pelvic exam.    A PAP screen, a test done DURING a pelvic exam, is NO longer recommended yearly.    March 2013, screening guidelines recommended by ACOG for PAP screen are:    1) First pap at age 21.    2) Pap every 3 years until age 30.    3) After age 30, pap every 3 years or Pap with HR HPV screen every 5 years until age 65.  4) Women do NOT need a vaginal Pap screen after a hysterectomy (surgical removal of the uterus) when they have not had cancer.    Exceptions:  1) Yearly pap if HIV+ or immunosuppressed secondary to organ transplant  2) MARCIE II-III continue routine screening for 20 years.    I encourage you continue looking for opportunities to choose a healthy lifestyle:       * Choose to eat a heart healthy diet. Check out the FOOD PLATE guidelines at: http://www.choosemyplate.gov/ for helpful hints on weight and cholesterol management.  Balance your caloric intake with exercise to maintain a BMI in the 22 to 26 range. For bone health: Eat calcium-rich foods like yogurt, broccoli or take chewable calcium pills (500 to 600 mg) twice a day with food.       * Exercise for at least an average of 30 minutes a day, 5 days of the week. This will help you control your weight, release stress, and help prevent disease.      * Take a Vitamin D3 supplement daily fall through spring and during summer unless you [id number]' full body sun exposure to skin without sunscreen.      * DO wear sunscreen to prevent  skin cancer after the first 15-30 minutes.      * Identify stressors in your life, find ways to release the stress, and, make time for yourself. PLEASE ask for help if mood changes last longer than two weeks.     * Limit alcohol to one drink per day.  No smoking.  Avoid second hand smoke. If you smoke, ask for help to stop.       *  If you are in a sexual relationship, talk with your partner about possible infection risks and take action to protect yourself from exposure to a sexual infection.    Please request an infection screen for STIs (sexually transmitted infections) if you are less than age 26 OR believe that you may be at risk.     Get a flu shot each year. Get a tetanus shot every 10 years. EVERYONE needs a pertussis (Whooping cough) booster.    See your dentist twice a year for an exam and preventive care cleaning.     Consider the following screen tests:    1) cholesterol test every 5 years.     2) yearly mammogram after age 40 unless you have identified risks.    3) colonoscopy every 10 years after age 50 unless you have identified risks.    4) diabetes blood test screening if you are at risk for diabetes.      Additional information that you may also find helpful:  The Internet now gives us access to LOTS of information -- some of it helpful, research documented and also plenty of harmful, anecdotal information that may not pertain to your situtaion. Consider visiting the following websites for accurate health information:    www.vitamindcouncil.org/ : Info and ongoing research re Vitamin D    www.fairview.org : Up to date and easily searchable information on multiple topics.    www.medlineplus.gov : medication info, interactive tutorials, watch real surgeries online    www.cdc.gov : public health info, travel advisories, epidemics (H1N1)    www.shayan/std.org: current research re diagnosis, treatment and prevention of sexually contacted infections.    www.health.[facility].[place] : [place] dept of heatlh, public  health issues in [place], N1N1    www.familydoctor.org : good info from the Academy of Family Physicians              Follow-ups after your visit        Follow-up notes from your care team     Return in 1 year (on 5/18/2018) for Preventative Health Visit.      Who to contact     Please call your clinic at [phone] to:    Ask questions about your health    Make or cancel appointments    Discuss your medicines    Learn about your test results    Speak to your doctor   If you have compliments or concerns about an experience at your clinic, or if you wish to file a complaint, please contact [facility] Patient Relations at [phone] or email us at [email or web address]         Additional Information About Your Visit        [employer] Information     [employer] gives you secure access to your electronic health record. If you see a primary care provider, you can also send messages to your care team and make appointments. If you have questions, please call your primary care clinic.  If you do not have a primary care provider, please call [phone] and they will assist you.      [employer] is an electronic gateway that provides easy, online access to your medical records. With [employer], you can request a clinic appointment, read your test results, renew a prescription or communicate with your care team.     To access your existing account, please contact your [facility] or call [phone] for assistance.        Care EveryWhere ID     This is your Care EveryWhere ID. This could be used by other organizations to access your [facility] medical records  [id number]        Your Vitals Were     Pulse BMI (Body Mass Index)                106 20.27 kg/m2           Blood Pressure from Last 3 Encounters:   05/18/17 125/79   03/23/17 (!) 137/96   11/11/14 138/88    Weight from Last 3 Encounters:   05/18/17 50.3 kg (110 lb 12.8 oz)   03/23/17 51.3 kg (113 lb)   02/10/15  50.8 kg (112 lb)              Today, you had the following     No orders found for display         Today's Medication Changes          These changes are accurate as of: 5/18/17 11:59 PM.  If you have any questions, ask your nurse or doctor.               Start taking these medicines.        Dose/Directions    hydrocortisone 2.5 % cream   Used for:  Rash and nonspecific skin eruption   Started by:  Omaira Siddiqui MD        Apply topically 2 times daily   Quantity:  15 g   Refills:  1            Where to get your medicines      These medications were sent to [id number]  [place], [place] - [address] [facility] [address]  [address], [place] [address]     Phone:  [phone]     hydrocortisone 2.5 % cream                Primary Care Provider Office Phone # Fax #    Noel Mayfield [phone]       [address] [address]        Thank you!     Thank you for choosing [facility]  for your care. Our goal is always to provide you with excellent care. Hearing back from our patients is one way we can continue to improve our services. Please take a few minutes to complete the written survey that you may receive in the mail after your visit with us. Thank you!             Your Updated Medication List - Protect others around you: Learn how to safely use, store and throw away your medicines at www.disposemymeds.org.          This list is accurate as of: 5/18/17 11:59 PM.  Always use your most recent med list.                   Brand Name Dispense Instructions for use    cholecalciferol 2000 UNITS Caps      Take 1 tablet by mouth daily       hydrocortisone 2.5 % cream     15 g    Apply topically 2 times daily

## 2017-05-18 NOTE — PATIENT INSTRUCTIONS

## 2017-07-18 ENCOUNTER — TELEPHONE (OUTPATIENT)
Dept: OBGYN | Facility: CLINIC | Age: 42
End: 2017-07-18

## 2017-07-18 DIAGNOSIS — O09.521 AMA (ADVANCED MATERNAL AGE) MULTIGRAVIDA 35+, FIRST TRIMESTER: Primary | ICD-10-CM

## 2017-07-18 NOTE — TELEPHONE ENCOUNTER
Patient called and would like to establish prenatal care   Patient LMP 17  Patient   Patient complains of breast tenderness and nausea.

## 2017-07-27 ENCOUNTER — OFFICE VISIT (OUTPATIENT)
Dept: OBGYN | Facility: CLINIC | Age: 42
End: 2017-07-27
Attending: ADVANCED PRACTICE MIDWIFE
Payer: COMMERCIAL

## 2017-07-27 VITALS
HEIGHT: 62 IN | SYSTOLIC BLOOD PRESSURE: 133 MMHG | HEART RATE: 107 BPM | DIASTOLIC BLOOD PRESSURE: 86 MMHG | BODY MASS INDEX: 20.76 KG/M2 | WEIGHT: 112.8 LBS

## 2017-07-27 DIAGNOSIS — O09.521 AMA (ADVANCED MATERNAL AGE) MULTIGRAVIDA 35+, FIRST TRIMESTER: ICD-10-CM

## 2017-07-27 DIAGNOSIS — Z98.890 H/O MYOMECTOMY: ICD-10-CM

## 2017-07-27 DIAGNOSIS — O09.91 HIGH-RISK PREGNANCY IN FIRST TRIMESTER: Primary | ICD-10-CM

## 2017-07-27 PROBLEM — O34.219 PREVIOUS CESAREAN DELIVERY, ANTEPARTUM CONDITION OR COMPLICATION: Status: ACTIVE | Noted: 2017-07-27

## 2017-07-27 PROBLEM — D25.1 INTRAMURAL LEIOMYOMA OF UTERUS: Chronic | Status: ACTIVE | Noted: 2017-07-27

## 2017-07-27 LAB
ABO + RH BLD: NORMAL
ABO + RH BLD: NORMAL
BASOPHILS # BLD AUTO: 0 10E9/L (ref 0–0.2)
BASOPHILS NFR BLD AUTO: 0.2 %
BLD GP AB SCN SERPL QL: NORMAL
BLOOD BANK CMNT PATIENT-IMP: NORMAL
DIFFERENTIAL METHOD BLD: ABNORMAL
EOSINOPHIL # BLD AUTO: 0.2 10E9/L (ref 0–0.7)
EOSINOPHIL NFR BLD AUTO: 1.3 %
ERYTHROCYTE [DISTWIDTH] IN BLOOD BY AUTOMATED COUNT: 12.6 % (ref 10–15)
HCT VFR BLD AUTO: 41.4 % (ref 35–47)
HGB BLD-MCNC: 13.8 G/DL (ref 11.7–15.7)
IMM GRANULOCYTES # BLD: 0 10E9/L (ref 0–0.4)
IMM GRANULOCYTES NFR BLD: 0.3 %
LYMPHOCYTES # BLD AUTO: 1.7 10E9/L (ref 0.8–5.3)
LYMPHOCYTES NFR BLD AUTO: 14.2 %
MCH RBC QN AUTO: 30.3 PG (ref 26.5–33)
MCHC RBC AUTO-ENTMCNC: 33.3 G/DL (ref 31.5–36.5)
MCV RBC AUTO: 91 FL (ref 78–100)
MONOCYTES # BLD AUTO: 0.7 10E9/L (ref 0–1.3)
MONOCYTES NFR BLD AUTO: 6.1 %
NEUTROPHILS # BLD AUTO: 9.4 10E9/L (ref 1.6–8.3)
NEUTROPHILS NFR BLD AUTO: 77.9 %
NRBC # BLD AUTO: 0 10*3/UL
NRBC BLD AUTO-RTO: 0 /100
PLATELET # BLD AUTO: 253 10E9/L (ref 150–450)
RBC # BLD AUTO: 4.56 10E12/L (ref 3.8–5.2)
SPECIMEN EXP DATE BLD: NORMAL
WBC # BLD AUTO: 12.1 10E9/L (ref 4–11)

## 2017-07-27 PROCEDURE — 99212 OFFICE O/P EST SF 10 MIN: CPT | Mod: ZF

## 2017-07-27 PROCEDURE — 76817 TRANSVAGINAL US OBSTETRIC: CPT | Mod: ZF

## 2017-07-27 PROCEDURE — 36415 COLL VENOUS BLD VENIPUNCTURE: CPT | Performed by: ADVANCED PRACTICE MIDWIFE

## 2017-07-27 PROCEDURE — 87340 HEPATITIS B SURFACE AG IA: CPT | Performed by: ADVANCED PRACTICE MIDWIFE

## 2017-07-27 PROCEDURE — 87086 URINE CULTURE/COLONY COUNT: CPT | Performed by: ADVANCED PRACTICE MIDWIFE

## 2017-07-27 PROCEDURE — 86900 BLOOD TYPING SEROLOGIC ABO: CPT | Performed by: ADVANCED PRACTICE MIDWIFE

## 2017-07-27 PROCEDURE — 86762 RUBELLA ANTIBODY: CPT | Performed by: ADVANCED PRACTICE MIDWIFE

## 2017-07-27 PROCEDURE — 87389 HIV-1 AG W/HIV-1&-2 AB AG IA: CPT | Performed by: ADVANCED PRACTICE MIDWIFE

## 2017-07-27 PROCEDURE — 86850 RBC ANTIBODY SCREEN: CPT | Performed by: ADVANCED PRACTICE MIDWIFE

## 2017-07-27 PROCEDURE — 82306 VITAMIN D 25 HYDROXY: CPT | Performed by: ADVANCED PRACTICE MIDWIFE

## 2017-07-27 PROCEDURE — 86901 BLOOD TYPING SEROLOGIC RH(D): CPT | Performed by: ADVANCED PRACTICE MIDWIFE

## 2017-07-27 PROCEDURE — 86780 TREPONEMA PALLIDUM: CPT | Performed by: ADVANCED PRACTICE MIDWIFE

## 2017-07-27 PROCEDURE — 85025 COMPLETE CBC W/AUTO DIFF WBC: CPT | Performed by: ADVANCED PRACTICE MIDWIFE

## 2017-07-27 RX ORDER — PRENATAL VIT/IRON FUM/FOLIC AC 27MG-0.8MG
1 TABLET ORAL DAILY
COMMUNITY
End: 2022-06-08

## 2017-07-27 NOTE — MR AVS SNAPSHOT
After Visit Summary   2017    Kitty Ibarra    MRN: [id number]           Patient Information     Date Of Birth          1975        Visit Information        Provider Department      2017 2:30 PM Roman Randall APRN CNM [facility]        Today's Diagnoses         -  1    H/O myomectomy           Follow-ups after your visit        Additional Services     MAT FETAL MED CTR REFERRAL-PREGNANCY       >> Patient may proceed with recommendations for further testing as directed by the Maternal Fetal Medicine Specialist >>    >> If requesting Fetal Echo: MFM will determine appropriate location for exam due to indication.    >> If requesting Lung Maturity Amnio:  If results indicate fetal lung maturity, induction or C/S is recommended within 36 hours.  Please schedule accordingly.     Dear Patient:   Please be aware that coverage of these services is subject to the terms and limitations of your health insurance plan.  Call member services at your health plan with any benefit or coverage questions.      Please bring the following to your appointment:    >>  Any x-rays, CTs or MRIs which have been performed.  Contact the facility where they were done to arrange for  prior to your scheduled appointment.  Any new CT, MRI or other procedures ordered by your specialist must be performed at a [place] facility or coordinated by your clinic's referral office.  >>  List of current medications   >>  This referral request   >>  Any documents/labs given to you for this referral                  Follow-up notes from your care team     Return in about 2 weeks (around 8/10/2017) for New OB Visit.      Who to contact     Please call your clinic at [phone] to:    Ask questions about your health    Make or cancel appointments    Discuss your medicines    Learn about your test results    Speak to your doctor   If you have compliments or concerns about an  "experience at your clinic, or if you wish to file a complaint, please contact [facility] Patient Relations at [phone] or email us at [email or web address]         Additional Information About Your Visit        GuestSpanhart Information     Stylect gives you secure access to your electronic health record. If you see a primary care provider, you can also send messages to your care team and make appointments. If you have questions, please call your primary care clinic.  If you do not have a primary care provider, please call [phone] and they will assist you.      Stylect is an electronic gateway that provides easy, online access to your medical records. With Stylect, you can request a clinic appointment, read your test results, renew a prescription or communicate with your care team.     To access your existing account, please contact your [facility] or call [phone] for assistance.        Care EveryWhere ID     This is your Care EveryWhere ID. This could be used by other organizations to access your [place] medical records  [id number]        Your Vitals Were     Pulse Height Last Period BMI (Body Mass Index)          107 1.575 m (5' 2\") 03/01/2017 20.63 kg/m2         Blood Pressure from Last 3 Encounters:   07/27/17 133/86   05/18/17 125/79   03/23/17 (!) 137/96    Weight from Last 3 Encounters:   07/27/17 51.2 kg (112 lb 12.8 oz)   05/18/17 50.3 kg (110 lb 12.8 oz)   03/23/17 51.3 kg (113 lb)              We Performed the Following     25- OH-Vitamin D     ABO/Rh Type and Screen     Anti Treponema     CBC with Platelets Differential     Hepatitis B Surface Antigen     HIV Antigen Antibody Combo     MAT FETAL MED CTR REFERRAL-PREGNANCY     Rubella Antibody IgG Quantitative     Urine Culture Aerobic Bacterial        Primary Care Provider Office Phone # Fax #    Noel Mayfield [phone]       [facility] [address] " [facility] [address]        Equal Access to Services     LEONIE WREN : Niraj Larry, eyad buck, delmar jerome. So [facility] [phone].    ATENCIÓN: Si habla español, tiene a lopez disposición servicios gratuitos de asistencia lingüística. Llame al [phone].    We comply with applicable federal civil rights laws and [place] laws. We do not discriminate on the basis of race, color, national origin, age, disability sex, sexual orientation or gender identity.            Thank you!     Thank you for choosing [facility]  for your care. Our goal is always to provide you with excellent care. Hearing back from our patients is one way we can continue to improve our services. Please take a few minutes to complete the written survey that you may receive in the mail after your visit with us. Thank you!             Your Updated Medication List - Protect others around you: Learn how to safely use, store and throw away your medicines at www.disposemymeds.org.          This list is accurate as of: 7/27/17  3:23 PM.  Always use your most recent med list.                   Brand Name Dispense Instructions for use Diagnosis    prenatal multivitamin  plus iron 27-0.8 MG Tabs per tablet      Take 1 tablet by mouth daily

## 2017-07-27 NOTE — NURSING NOTE
Chief Complaint   Patient presents with     Prenatal Care     OB intake visit. MONICA 3/7/18 8 weeks and 1 day       Debbie Amado, CMA 7/27/2017

## 2017-07-27 NOTE — LETTER
2017       RE: Kitty Ibarra  [address]  [facility] [address]     Dear Colleague,    Thank you for referring your patient, Kitty Ibarra, to the [facility] at [facility]. Please see a copy of my visit note below.    42 year old female, , Estimated Date of Delivery: Unknown  presents for confirmation of dates and assessment of viability. This study was done transvaginally.    Measurements     CRL = 16.9 mm = 8  weeks  EGA.   MONICA = 3/7/18.     Fetal anatomy appears normal for gestational age.     Fetal/Fetal Cardiac Activity: Present.  FHR = 159bpm.     Implantation: Intrauterine.     Cervix = 6.7 cm      Maternal structures:  Right anterior myoma-5.3 x 4.9 x 6.2cm  Right posterior myoma-3.2 x 3.5 x 3.2cm    Impression: viable IUP at 8+1 by today's scan. Use EDC 3/7/18.   Two intramural myomata.     Recommend comprehensive scan at 18 to 20 weeks.    ERROL Hector    Again, thank you for allowing me to participate in the care of your patient.      Sincerely,    [facility]

## 2017-07-27 NOTE — MR AVS SNAPSHOT
After Visit Summary   7/27/2017    Kitty Ibarra    MRN: [id number]           Patient Information     Date Of Birth          1975        Visit Information        Provider Department      7/27/2017 2:00 PM [facility] ULTRASOUND [facility]        Today's Diagnoses     AMA (advanced maternal age) multigravida 35+, first trimester           Follow-ups after your visit        Who to contact     Please call your clinic at [phone] to:    Ask questions about your health    Make or cancel appointments    Discuss your medicines    Learn about your test results    Speak to your doctor   If you have compliments or concerns about an experience at your clinic, or if you wish to file a complaint, please contact [facility] Patient Relations at [phone] or email us at [email or web address]         Additional Information About Your Visit        MyChart Information     [employer] gives you secure access to your electronic health record. If you see a primary care provider, you can also send messages to your care team and make appointments. If you have questions, please call your primary care clinic.  If you do not have a primary care provider, please call [phone] and they will assist you.      tagWALLET is an electronic gateway that provides easy, online access to your medical records. With tagWALLET, you can request a clinic appointment, read your test results, renew a prescription or communicate with your care team.     To access your existing account, please contact your [facility] or call [phone] for assistance.        Care EveryWhere ID     This is your Care EveryWhere ID. This could be used by other organizations to access your [place] medical records  [id number]        Your Vitals Were     Last Period                   03/01/2017            Blood Pressure from Last 3 Encounters:   07/27/17 133/86    05/18/17 125/79   03/23/17 (!) 137/96    Weight from Last 3 Encounters:   07/27/17 51.2 kg (112 lb 12.8 oz)   05/18/17 50.3 kg (110 lb 12.8 oz)   03/23/17 51.3 kg (113 lb)              We Performed the Following     Dating ultrasound less than 14 weeks gestation Transvag  - 10413 (In Clinic)        Primary Care Provider Office Phone # Fax #    Noel Mayfield [phone]       [place] [id number] [place] [address]        Equal Access to Services     [facility] : Hadii jn menon Sosallie, waamanda cielo, qaneda kaalmada pranav, delmar sommers . So [facility] [phone].    ATENCIÓN: Si habla español, tiene a lopez disposición servicios gratuitos de asistencia lingüística. Llame al [phone].    We comply with applicable federal civil rights laws and [place] laws. We do not discriminate on the basis of race, color, national origin, age, disability sex, sexual orientation or gender identity.            Thank you!     Thank you for choosing [facility]  for your care. Our goal is always to provide you with excellent care. Hearing back from our patients is one way we can continue to improve our services. Please take a few minutes to complete the written survey that you may receive in the mail after your visit with us. Thank you!             Your Updated Medication List - Protect others around you: Learn how to safely use, store and throw away your medicines at www.disposemymeds.org.          This list is accurate as of: 7/27/17  3:20 PM.  Always use your most recent med list.                   Brand Name Dispense Instructions for use Diagnosis    prenatal multivitamin  plus iron 27-0.8 MG Tabs per tablet      Take 1 tablet by mouth daily

## 2017-07-27 NOTE — PROGRESS NOTES
Subjective   42 year old female who presents to clinic for initiation of OB care.   at 8w1d with estimated date of delivery of Mar 7, 2018 based on US.  Pregnancy is unplanned but coping well.  Symptoms since LMP include nausea and fatigue.  Patient has tried these relief measures: frequent snacks.    Co-morbids: AMA  Medications: PNV  Reviewed dating ultrasound, including fibroid findings with patient.     PERSONAL/SOCIAL HISTORY  Lives with their family,  x 14 years (Ender) and two sons.   Employment: Part time.  Her job involves light activity .  Additional items: None    Objective  -VS: reviewed and within normal limits   -General appearance: no acute distress, patient is comfortable   NEUROLOGICAL/PSYCHIATRIC   - Orientated x3,   -Mood and affect: : normal   Genetic/Infection questionnaire completed, risks include AMA.    Assessment/Plan   at 8w1d  by US.  Encounter Diagnosis   Name Primary?      Yes     Orders Placed This Encounter   Procedures     25- OH-Vitamin D     Anti Treponema     CBC with Platelets Differential     Hepatitis B Surface Antigen     HIV Antigen Antibody Combo     Rubella Antibody IgG Quantitative     MAT FETAL MED CTR REFERRAL-PREGNANCY     ABO/Rh Type and Screen     Orders Placed This Encounter   Medications     Prenatal Vit-Fe Fumarate-FA (PRENATAL MULTIVITAMIN  PLUS IRON) 27-0.8 MG TABS per tablet     Sig: Take 1 tablet by mouth daily     - Oriented to Practice, types of care, and how to reach a provider.  Discussed due to history of  x 2 and myomectomy, MD care.   - Patient received 1st trimester new OB education packet complete with aide of The Expectant Family booklet including information on genetic screening test options.    - Patient desires 1st trimester screening which was ordered.  - Patient was encouraged to start prenatal vitamins as tolerated.    - Patient was sent to lab for routine OB labs.    - Patient instructed to schedule new OB  exam with provider at 10 weeks.    - Pregnancy concerns to be addressed by provider at new OB exam include: previous C/S x2, pap up to date, GC/CT at next visit.

## 2017-07-27 NOTE — PROGRESS NOTES
42 year old female, , Estimated Date of Delivery: Unknown  presents for confirmation of dates and assessment of viability. This study was done transvaginally.    Measurements     CRL = 16.9 mm = 8  weeks  EGA.   MONICA = 3/7/18.     Fetal anatomy appears normal for gestational age.     Fetal/Fetal Cardiac Activity: Present.  FHR = 159bpm.     Implantation: Intrauterine.     Cervix = 6.7 cm      Maternal structures:  Right anterior myoma-5.3 x 4.9 x 6.2cm  Right posterior myoma-3.2 x 3.5 x 3.2cm    Impression: viable IUP at 8+1 by today's scan. Use EDC 3/7/18.   Two intramural myomata.     Recommend comprehensive scan at 18 to 20 weeks.    ERROL Hector

## 2017-07-27 NOTE — LETTER
2017       RE: Kitty Ibarra  [address]  [facility] [address]     Dear Colleague,    Thank you for referring your patient, Kitty Ibarra, to the [facility] at [facility]. Please see a copy of my visit note below.    Subjective   42 year old female who presents to clinic for initiation of OB care.   at 8w1d with estimated date of delivery of Mar 7, 2018 based on US.  Pregnancy is unplanned but coping well.  Symptoms since LMP include nausea and fatigue.  Patient has tried these relief measures: frequent snacks.    Co-morbids: AMA  Medications: PNV  Reviewed dating ultrasound, including fibroid findings with patient.     PERSONAL/SOCIAL HISTORY  Lives with their family,  x 14 years (Ender) and two sons.   Employment: Part time.  Her job involves light activity .  Additional items: None    Objective  -VS: reviewed and within normal limits   -General appearance: no acute distress, patient is comfortable   NEUROLOGICAL/PSYCHIATRIC   - Orientated x3,   -Mood and affect: : normal   Genetic/Infection questionnaire completed, risks include AMA.    Assessment/Plan   at 8w1d  by US.  Encounter Diagnosis   Name Primary?      Yes     Orders Placed This Encounter   Procedures     25- OH-Vitamin D     Anti Treponema     CBC with Platelets Differential     Hepatitis B Surface Antigen     HIV Antigen Antibody Combo     Rubella Antibody IgG Quantitative     MAT FETAL MED CTR REFERRAL-PREGNANCY     ABO/Rh Type and Screen     Orders Placed This Encounter   Medications     Prenatal Vit-Fe Fumarate-FA (PRENATAL MULTIVITAMIN  PLUS IRON) 27-0.8 MG TABS per tablet     Sig: Take 1 tablet by mouth daily     - Oriented to Practice, types of care, and how to reach a provider.  Discussed due to history of  x 2 and myomectomy, MD care.   - Patient received 1st trimester new OB education packet complete with  aide of The Expectant Family booklet including information on genetic screening test options.    - Patient desires 1st trimester screening which was ordered.  - Patient was encouraged to start prenatal vitamins as tolerated.    - Patient was sent to lab for routine OB labs.    - Patient instructed to schedule new OB exam with provider at 10 weeks.    - Pregnancy concerns to be addressed by provider at new OB exam include: previous C/S x2, pap up to date, GC/CT at next visit.    Again, thank you for allowing me to participate in the care of your patient.      Sincerely,    HUNTER Hung CNM

## 2017-07-28 PROBLEM — E55.9 VITAMIN D DEFICIENCY: Status: ACTIVE | Noted: 2017-07-28

## 2017-07-28 LAB
BACTERIA SPEC CULT: NO GROWTH
DEPRECATED CALCIDIOL+CALCIFEROL SERPL-MC: 28 UG/L (ref 20–75)
HBV SURFACE AG SERPL QL IA: NONREACTIVE
HIV 1+2 AB+HIV1 P24 AG SERPL QL IA: NORMAL
Lab: NORMAL
MICRO REPORT STATUS: NORMAL
RUBV IGG SERPL IA-ACNC: 173 IU/ML
SPECIMEN SOURCE: NORMAL
T PALLIDUM IGG+IGM SER QL: NEGATIVE

## 2017-08-23 ENCOUNTER — PRE VISIT (OUTPATIENT)
Dept: MATERNAL FETAL MEDICINE | Facility: CLINIC | Age: 42
End: 2017-08-23

## 2017-08-24 ENCOUNTER — OFFICE VISIT (OUTPATIENT)
Dept: MATERNAL FETAL MEDICINE | Facility: CLINIC | Age: 42
End: 2017-08-24
Attending: ADVANCED PRACTICE MIDWIFE
Payer: COMMERCIAL

## 2017-08-24 ENCOUNTER — HOSPITAL ENCOUNTER (OUTPATIENT)
Dept: ULTRASOUND IMAGING | Facility: CLINIC | Age: 42
Discharge: HOME | End: 2017-08-24
Attending: ADVANCED PRACTICE MIDWIFE | Admitting: OBSTETRICS & GYNECOLOGY
Payer: COMMERCIAL

## 2017-08-24 DIAGNOSIS — O09.91 HIGH-RISK PREGNANCY IN FIRST TRIMESTER: ICD-10-CM

## 2017-08-24 DIAGNOSIS — O09.521 AMA (ADVANCED MATERNAL AGE) MULTIGRAVIDA 35+, FIRST TRIMESTER: Primary | ICD-10-CM

## 2017-08-24 DIAGNOSIS — O26.90 PREGNANCY RELATED CONDITION, UNSPECIFIED TRIMESTER: ICD-10-CM

## 2017-08-24 DIAGNOSIS — O34.219 PREVIOUS CESAREAN DELIVERY, ANTEPARTUM CONDITION OR COMPLICATION: ICD-10-CM

## 2017-08-24 PROCEDURE — 76813 OB US NUCHAL MEAS 1 GEST: CPT

## 2017-08-24 PROCEDURE — 36415 COLL VENOUS BLD VENIPUNCTURE: CPT | Performed by: OBSTETRICS & GYNECOLOGY

## 2017-08-24 PROCEDURE — 40000791 ZZHCL STATISTIC VERIFI PRENATAL TRISOMY 21,18,13: Performed by: OBSTETRICS & GYNECOLOGY

## 2017-08-24 PROCEDURE — 96040 ZZH GENETIC COUNSELING, EACH 30 MINUTES: CPT | Mod: ZF | Performed by: GENETIC COUNSELOR, MS

## 2017-08-24 NOTE — PROGRESS NOTES
"Please see \"Imaging\" tab under \"Chart Review\" for details of today's US at the [facility].    Christiano Kelly MD  Maternal-Fetal Medicine      "

## 2017-08-24 NOTE — PROGRESS NOTES
[facility]  Genetic Counseling Consult    Patient: Kitty Ibarra YOB: 1975   Date of Service: 17      Kitty Ibarra was seen with her  at [facility] for genetic consultation to discuss the options for screening and testing for fetal chromosome abnormalities.  The indication for genetic counseling is advanced maternal age.        Impression/Plan:   1.  Kitty had an ultrasound and blood draw for NIPT (Innatal test through [facility]).  Results are expected within 7-10 days, and will be available in [facility].  We will contact her to discuss the results, and a copy will be forwarded to the office of the referring OB provider.    2.  Maternal serum AFP (single marker screen) is recommended after 15 weeks to screen for open neural tube defects. A quad screen should not be performed.    3.  An 18-20 week comprehensive ultrasound is standard of care for all women 35 or older at delivery.    Pregnancy History:   /Parity:    Age at Delivery: 42 year old  MONICA: 3/7/2018, Date entered prior to episode creation  Gestational Age: 12w1d    No significant complications or exposures were reported in the current pregnancy.    Kitty gaffney pregnancy history is significant for previous c/s at 36 weeks due to history of myomectomy. This was an unexpected pregnancy.    Medical History:   Kitty gaffney reported medical history is not expected to impact pregnancy management or risks to fetal development.       Family History:   A three-generation pedigree was obtained, and is scanned under the  Media  tab.   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  Simperium is a computer programer.    They have two healthy boys, ages 10 and 7.    Otherwise, the reported family history is negative for multiple miscarriages, stillbirths, birth defects, mental  retardation, known genetic conditions, and consanguinity.       Carrier Screening:   The patient reports that she is of  ancestry:      Cystic fibrosis is an autosomal recessive genetic condition that occurs with increased frequency in individuals of  ancestry and carrier screening for this condition is available.  In addition,  screening in the [facility] includes cystic fibrosis.    The patient reports that the father of the pregnancy has  ancestry:      The hemoglobinopathies are a group of genetic blood diseases that occur with increased frequency in individuals of  ancestry and carrier screening for these conditions is available.  Carrier screening for the hemoglobinopathies includes a CBC with red blood cell indices, a ferritin level, and a quantitative hemoglobin electrophoresis or HPLC.  In addition,  screening in the [facility] includes many of the hemoglobinopathies.              Expanded carrier screening for mutations in a large panel of genes associated with autosomal recessive conditions including cystic fibrosis, spinal muscular atrophy, and others, is now available.      The patient was not certain about whether to pursue carrier screening today.  She was provided with a brochure about her testing options, and contact information if she has questions or wishes to pursue screening.       Risk Assessment for Chromosome Conditions:   We explained that the risk for fetal chromosome abnormalities increases with maternal age. We discussed specific features of common chromosome abnormalities, including Down syndrome, trisomy 13, trisomy 18, and sex chromosome trisomies.      - At age 42 at delivery, the risk to have a baby with Down syndrome is 1 in 64.    - At age 42 at delivery, the risk to have a baby with any chromosome abnormality is 1 in 40.          Testing Options:   We discussed the following options:   Non-invasive Prenatal Testing  (NIPT)    Maternal plasma cell-free DNA testing; first trimester ultrasound with nuchal translucency and nasal bone assessment is recommended, when appropriate    Screens for fetal trisomy 21, trisomy 13, trisomy 18, and sex chromosome aneuploidy    Cannot screen for open neural tube defects; maternal serum AFP after 15 weeks is recommended     Chorionic villus sampling (CVS)    Invasive procedure typically performed in the first trimester by which placental villi are obtained for the purpose of chromosome analysis and/or other prenatal genetic analysis    Diagnostic results; >99% sensitivity for fetal chromosome abnormalities    Cannot test for open neural tube defects; maternal serum AFP after 15 weeks is recommended     Genetic Amniocentesis    Invasive procedure typically performed in the second trimester by which amniotic fluid is obtained for the purpose of chromosome analysis and/or other prenatal genetic analysis    Diagnostic results; >99% sensitivity for fetal chromosome abnormalities    AFAFP measurement tests for open neural tube defects     Comprehensive (Level II) ultrasound: Detailed ultrasound performed between 18-22 weeks gestation to screen for major birth defects and markers for aneuploidy.                We reviewed the benefits and limitations of this testing.  Screening tests provide a risk assessment specific to the pregnancy for certain fetal chromosome abnormalities, but cannot definitively diagnose or exclude a fetal chromosome abnormality.  Follow-up genetic counseling and consideration of diagnostic testing is recommended with any abnormal screening result.     Diagnostic tests carry inherent risks- including risk of miscarriage- that require careful consideration.  These tests can detect fetal chromosome abnormalities with greater than 99% certainty.  Results can be compromised by maternal cell contamination or mosaicism, and are limited by the resolution of cytogenetic G-banding  technology.  There is no screening nor diagnostic test that can detect all forms of birth defects or mental disability.     It was a pleasure to be involved with Kitty s care. Face-to-face time of the meeting was 40 minutes.    Peyton Prakash JD [facility]  Certified Genetic Counselor  Pager: [phone]

## 2017-08-24 NOTE — MR AVS SNAPSHOT
After Visit Summary   2017    Kitty Ibarra    MRN: [id number]           Patient Information     Date Of Birth          1975        Visit Information        Provider Department      2017 9:30 AM Peyton Prakash GC [facility] Maternal Fetal Medicine [facility]        Today's Diagnoses     AMA (advanced maternal age) multigravida 35+, first trimester    -  1    Pregnancy related condition, unspecified trimester        High-risk pregnancy in first trimester        Previous  delivery, antepartum condition or complication           Follow-ups after your visit        Your next 10 appointments already scheduled     Oct 04, 2017 11:45 AM CDT   MFM US COMP with [id number]   [facility] Maternal Fetal Medicine Ultrasound - [place] ([facility])    [address]  [facility] [address] [phone]           Wear comfortable clothes and leave your valuables at home.            Oct 04, 2017 12:15 PM CDT   Radiology MD with UR SAMIRA PARIKH   [facility] Maternal Fetal Medicine - [facility])    [address]  [facility] [address]   [phone]           Please arrive at the time given for your first appointment. This visit is used internally to schedule the physician's time during your ultrasound.              Who to contact     If you have questions or need follow up information about today's clinic visit or your schedule please contact [facility] MATERNAL FETAL MEDICINE [facility] directly at [phone].  Normal or non-critical lab and imaging results will be communicated to you by MyChart, letter or phone within 4 business days after the clinic has received the results. If you do not hear from us within 7 days, please contact the clinic through MyChart or phone. If you have a critical or abnormal lab result, we will notify you by phone as soon as possible.  Submit refill requests through  [employer] or call your pharmacy and they will forward the refill request to us. Please allow 3 business days for your refill to be completed.          Additional Information About Your Visit        MyChart Information     [employer] gives you secure access to your electronic health record. If you see a primary care provider, you can also send messages to your care team and make appointments. If you have questions, please call your primary care clinic.  If you do not have a primary care provider, please call [phone] and they will assist you.        Care EveryWhere ID     This is your Care EveryWhere ID. This could be used by other organizations to access your [place] medical records  [id number]        Your Vitals Were     Last Period                   03/01/2017            Blood Pressure from Last 3 Encounters:   07/27/17 133/86   05/18/17 125/79   03/23/17 (!) 137/96    Weight from Last 3 Encounters:   07/27/17 51.2 kg (112 lb 12.8 oz)   05/18/17 50.3 kg (110 lb 12.8 oz)   03/23/17 51.3 kg (113 lb)              We Performed the Following     M Genetic Counseling     Non Invasive Prenatal Test Cell Free DNA        Primary Care Provider Office Phone # Fax #    Noel Mayfield [phone]       [address] [place] [address]        Equal Access to Services     LEONIE WREN : Hadii jn bettencourt hadasho Soomaali, waaxda luqadaha, qaybta kaalmada adeegyada, delmar sommers . So [facility] [phone].    ATENCIÓN: Si habla español, tiene a lopez disposición servicios gratuitos de asistencia lingüística. Llame al [phone].    We comply with applicable federal civil rights laws and [place] laws. We do not discriminate on the basis of race, color, national origin, age, disability sex, sexual orientation or gender identity.            Thank you!     Thank you for choosing [facility]  for your care. Our goal is always to provide you with  excellent care. Hearing back from our patients is one way we can continue to improve our services. Please take a few minutes to complete the written survey that you may receive in the mail after your visit with us. Thank you!             Your Updated Medication List - Protect others around you: Learn how to safely use, store and throw away your medicines at www.disposemymeds.org.          This list is accurate as of: 8/24/17  1:49 PM.  Always use your most recent med list.                   Brand Name Dispense Instructions for use Diagnosis    prenatal multivitamin plus iron 27-0.8 MG Tabs per tablet      Take 1 tablet by mouth daily

## 2017-08-24 NOTE — MR AVS SNAPSHOT
After Visit Summary   8/24/2017    Kitty Ibarra    MRN: [id number]           Patient Information     Date Of Birth          1975        Visit Information        Provider Department      8/24/2017 10:45 AM Christiano Kelly MD [facility] Maternal Fetal Medicine [facility]        Today's Diagnoses     AMA (advanced maternal age) multigravida 35+, first trimester    -  1       Follow-ups after your visit        Who to contact     If you have questions or need follow up information about today's clinic visit or your schedule please contact [facility] MATERNAL FETAL MEDICINE [facility] directly at [phone].  Normal or non-critical lab and imaging results will be communicated to you by MyChart, letter or phone within 4 business days after the clinic has received the results. If you do not hear from us within 7 days, please contact the clinic through [employer] or phone. If you have a critical or abnormal lab result, we will notify you by phone as soon as possible.  Submit refill requests through [employer] or call your pharmacy and they will forward the refill request to us. Please allow 3 business days for your refill to be completed.          Additional Information About Your Visit        MyChart Information     [employer] gives you secure access to your electronic health record. If you see a primary care provider, you can also send messages to your care team and make appointments. If you have questions, please call your primary care clinic.  If you do not have a primary care provider, please call [phone] and they will assist you.        Care EveryWhere ID     This is your Care EveryWhere ID. This could be used by other organizations to access your [place] medical records  [id number]        Your Vitals Were     Last Period                   03/01/2017            Blood Pressure from Last 3 Encounters:   07/27/17 133/86   05/18/17 125/79   03/23/17 (!) 137/96    Weight from Last 3  Encounters:   07/27/17 51.2 kg (112 lb 12.8 oz)   05/18/17 50.3 kg (110 lb 12.8 oz)   03/23/17 51.3 kg (113 lb)              Today, you had the following     No orders found for display       Primary Care Provider Office Phone # Fax #    Noel Mayfield [phone]       [address] [place] [address]        Equal Access to Services     LEONIE WREN : Hadii aad ku hadasho Soomaali, waaxda luqadaha, qaybta kaalmada adeegyada, waxay idiin hayaan adeeg linnette sommers . So [facility] [phone].    ATENCIÓN: Si habla español, tiene a lopez disposición servicios gratuitos de asistencia lingüística. Ll[facility] [phone].    We comply with applicable federal civil rights laws and [place] laws. We do not discriminate on the basis of race, color, national origin, age, disability sex, sexual orientation or gender identity.            Thank you!     Thank you for choosing [facility]  for your care. Our goal is always to provide you with excellent care. Hearing back from our patients is one way we can continue to improve our services. Please take a few minutes to complete the written survey that you may receive in the mail after your visit with us. Thank you!             Your Updated Medication List - Protect others around you: Learn how to safely use, store and throw away your medicines at www.disposemymeds.org.          This list is accurate as of: 8/24/17 11:34 AM.  Always use your most recent med list.                   Brand Name Dispense Instructions for use Diagnosis    prenatal multivitamin plus iron 27-0.8 MG Tabs per tablet      Take 1 tablet by mouth daily

## 2017-08-28 ENCOUNTER — TELEPHONE (OUTPATIENT)
Dept: MATERNAL FETAL MEDICINE | Facility: CLINIC | Age: 42
End: 2017-08-28

## 2017-08-28 NOTE — TELEPHONE ENCOUNTER
August 28, 2017. Contacted Kitty Erika Zhang Ibarra and left voicemail message on the phone with normal Progenity/Innatal free fetal DNA screening for Down syndrome and trisomies 13 and 18 (see scanned report from [email or web address]).  Sex chromosome were also assessed and consistent with XY - gender NOT disclosed on voicemail.  Discussed high detection rates for fetal Down syndrome, trisomies 13 and 18, and fetal sex chromosome abnormalities; however, not 100%.   Results available in epic for review    Plan:  Routine follow up with primary Ob/Gyn.    Peyton Prakash MS, [facility]  [phone].

## 2017-08-30 ENCOUNTER — TELEPHONE (OUTPATIENT)
Dept: MATERNAL FETAL MEDICINE | Facility: CLINIC | Age: 42
End: 2017-08-30

## 2017-08-30 NOTE — TELEPHONE ENCOUNTER
8/30/17.  Kitty contacted me on the phone as she had questions about additional diagnostic testing ( CVS and amniocentesis).  Given that this was an unexpected pregnancy, Kitty is concerned about the baby having a birth defect/chromosome abnormality.  We reviewed her normal Innatal NIPT results and the detection rates and limitations of this screen.  We reviewed the benefits and risks for CVS and amniocentesis.  We also reviewed risks associated with advanced paternal age and limited screening for these issues. Patient does not want to know gender from Innatal.  We reviewed the background risk of 2-3% for all birth defects.  We reviewed comprehensive ultrasound at 18 weeks gestation and option of a 2/3 complete ultrasound at 16 weeks gestation.    Plan:  Patient will further discuss options with .  She wanted the phone number for [facility] to ask additional questions about if they even have availability for CVS as she will be 14 weeks gestation early next week.    She will talk to her primary clinic if she wishes a referral for 2/3 complete ultrasound, CVS or amniocentesis. She will contact me with further questions.    Peyton Prakash MS, [facility]  Certified Genetic Counselor  [phone]

## 2017-08-31 LAB — LAB SCANNED RESULT: NORMAL

## 2017-09-01 ENCOUNTER — OFFICE VISIT (OUTPATIENT)
Dept: OBGYN | Facility: CLINIC | Age: 42
End: 2017-09-01
Attending: MIDWIFE
Payer: COMMERCIAL

## 2017-09-01 VITALS
DIASTOLIC BLOOD PRESSURE: 84 MMHG | BODY MASS INDEX: 20.98 KG/M2 | WEIGHT: 114.7 LBS | SYSTOLIC BLOOD PRESSURE: 122 MMHG | HEART RATE: 106 BPM

## 2017-09-01 DIAGNOSIS — Z98.890 H/O MYOMECTOMY: ICD-10-CM

## 2017-09-01 DIAGNOSIS — O09.91 HIGH-RISK PREGNANCY IN FIRST TRIMESTER: Primary | ICD-10-CM

## 2017-09-01 DIAGNOSIS — O34.219 PREVIOUS CESAREAN DELIVERY, ANTEPARTUM CONDITION OR COMPLICATION: ICD-10-CM

## 2017-09-01 LAB
ALT SERPL W P-5'-P-CCNC: 15 U/L (ref 0–50)
ANION GAP SERPL CALCULATED.3IONS-SCNC: 10 MMOL/L (ref 3–14)
AST SERPL W P-5'-P-CCNC: 10 U/L (ref 0–45)
BUN SERPL-MCNC: 10 MG/DL (ref 7–30)
CALCIUM SERPL-MCNC: 8.1 MG/DL (ref 8.5–10.1)
CHLORIDE SERPL-SCNC: 108 MMOL/L (ref 94–109)
CO2 SERPL-SCNC: 23 MMOL/L (ref 20–32)
CREAT SERPL-MCNC: 0.45 MG/DL (ref 0.52–1.04)
CREAT UR-MCNC: 93 MG/DL
GFR SERPL CREATININE-BSD FRML MDRD: >90 ML/MIN/1.7M2
GLUCOSE SERPL-MCNC: 96 MG/DL (ref 70–99)
POTASSIUM SERPL-SCNC: 3.7 MMOL/L (ref 3.4–5.3)
PROT UR-MCNC: 0.16 G/L
PROT/CREAT 24H UR: 0.18 G/G CR (ref 0–0.2)
SODIUM SERPL-SCNC: 141 MMOL/L (ref 133–144)
TSH SERPL DL<=0.005 MIU/L-ACNC: 1.28 MU/L (ref 0.4–4)
URATE SERPL-MCNC: 3.4 MG/DL (ref 2.6–6)

## 2017-09-01 PROCEDURE — 84450 TRANSFERASE (AST) (SGOT): CPT | Performed by: MIDWIFE

## 2017-09-01 PROCEDURE — 84550 ASSAY OF BLOOD/URIC ACID: CPT | Performed by: MIDWIFE

## 2017-09-01 PROCEDURE — 36415 COLL VENOUS BLD VENIPUNCTURE: CPT | Performed by: MIDWIFE

## 2017-09-01 PROCEDURE — 84156 ASSAY OF PROTEIN URINE: CPT | Performed by: MIDWIFE

## 2017-09-01 PROCEDURE — 87591 N.GONORRHOEAE DNA AMP PROB: CPT | Performed by: MIDWIFE

## 2017-09-01 PROCEDURE — 80048 BASIC METABOLIC PNL TOTAL CA: CPT | Performed by: MIDWIFE

## 2017-09-01 PROCEDURE — 99212 OFFICE O/P EST SF 10 MIN: CPT | Mod: ZF

## 2017-09-01 PROCEDURE — 87491 CHLMYD TRACH DNA AMP PROBE: CPT | Performed by: MIDWIFE

## 2017-09-01 PROCEDURE — 84460 ALANINE AMINO (ALT) (SGPT): CPT | Performed by: MIDWIFE

## 2017-09-01 PROCEDURE — 84443 ASSAY THYROID STIM HORMONE: CPT | Performed by: MIDWIFE

## 2017-09-01 NOTE — PATIENT INSTRUCTIONS
"  Thank you for choosing [facility] for your obstetrical care.  We are an integrated health clinic with obstetricians, midwives, a psychologist, an acupuncturist, a nutritionist, a pharmacist, internal medicine and family practice all in one.  If you have questions about services offered please ask.      o Please keep all appointments with your provider.  You will help catch, prevent and treat problems early.  o Review your Expectant Family booklet and folder given to you at this intake visit.  It can answer many basic questions including:    Treatment for nausea and vomiting    Medications that are safe in pregnancy    Healthy diet and weight gain    Exercise and activity  o ASK questions!!  Please use \"Questions for my New OB visit\" form to write down any questions or concerns for your next visit.  o Eat a healthy diet.  Visit www.choosemyplate.gov and click on  Pregnancy and Breastfeeding  for information and tips  o Do not smoke.  Avoid other people's smoke, too.  We are happy to help with referrals to stop smoking programs.  o Do not drink alcohol.  o Try to avoid people who have colds or other infections.  Practice good hand washing.  o Consider registering for our Healthy Pregnancy Class here at [facility].  This class is offered every 3rd Wednesday from 2:30-4:30 p.m.  [facility] at [phone] or online at [email or web address] or [email or web address]  o Consider registering for prenatal education classes through [place] at [facility].  You can view class schedules and register online at [email or web address] or call [phone] for questions     For urgent concerns, call [facility] at [phone] to speak with a triage nurse during regular clinic hours (8:00 am - 4:30 pm).  This line is answered by our service 24 hours a day, after hours a provider will return your call.  "

## 2017-09-01 NOTE — MR AVS SNAPSHOT
"              After Visit Summary   2017    Kitty Ibarra    MRN: [id number]           Patient Information     Date Of Birth          1975        Visit Information        Provider Department      2017 9:30 AM Lizz Madrid APRN CN[facility]        Today's Diagnoses         -  1    H/O myomectomy        Previous  times 2          Care Instructions      Thank you for choosing [facility] ([facility]) for your obstetrical care.  We are an integrated health clinic with obstetricians, midwives, a psychologist, an acupuncturist, a nutritionist, a pharmacist, internal medicine and family practice all in one.  If you have questions about services offered please ask.      o Please keep all appointments with your provider.  You will help catch, prevent and treat problems early.  o Review your Expectant Family booklet and folder given to you at this intake visit.  It can answer many basic questions including:    Treatment for nausea and vomiting    Medications that are safe in pregnancy    Healthy diet and weight gain    Exercise and activity  o ASK questions!!  Please use \"Questions for my New OB visit\" form to write down any questions or concerns for your next visit.  o Eat a healthy diet.  Visit www.choosemyplate.gov and click on  Pregnancy and Breastfeeding  for information and tips  o Do not smoke.  Avoid other people's smoke, too.  We are happy to help with referrals to stop smoking programs.  o Do not drink alcohol.  o Try to avoid people who have colds or other infections.  Practice good hand washing.  o Consider registering for our Healthy Pregnancy Class here at [facility].  This class is offered every  from 2:30-4:30 p.m.  [facility] at [phone] or online at [email or web address] or [email or web address]  o Consider registering for prenatal education classes through [facility] at [facility].  You can " view class schedules and register online at [email or web address] or call [phone] for questions     For urgent concerns, call [facility] at [phone] to speak with a triage nurse during regular clinic hours (8:00 am - 4:30 pm).  This line is answered by our service 24 hours a day, after hours a provider will return your call.          Follow-ups after your visit        Follow-up notes from your care team     Return in 4 weeks (on 9/29/2017) for next OB visit, RAFAEL.      Your next 10 appointments already scheduled     Sep 27, 2017 10:45 AM CDT   RETURN OB with Karen Nieves MD   [facility] ([facility])    [facility]  [address]  [address]  [facility] [address] [phone]            Oct 04, 2017 11:45 AM CDT   MFM US COMP with [id number]   [facility]etal [facility] Ultrasound - [facility])    [address]  [facility] [id number] [phone]           Wear comfortable clothes and leave your valuables at home.            Oct 04, 2017 12:15 PM CDT   Radiology MD with UR MIKE PARIKH   [facility] - [facility])    [address]  [facility] [id number] [phone]           Please arrive at the time given for your first appointment. This visit is used internally to schedule the physician's time during your ultrasound.              Who to contact     Please call your clinic at [phone] to:    Ask questions about your health    Make or cancel appointments    Discuss your medicines    Learn about your test results    Speak to your doctor   If you have compliments or concerns about an experience at your clinic, or if you wish to file a complaint, please contact [facility] Patient Relations at [phone] or email us at [email or web address]         Additional  Information About Your Visit        [employer] Information     [email or web address] gives you secure access to your electronic health record. If you see a primary care provider, you can also send messages to your care team and make appointments. If you have questions, please call your primary care clinic.  If you do not have a primary care provider, please call [phone] and they will assist you.      [email or web address] is an electronic gateway that provides easy, online access to your medical records. With [email or web address], you can request a clinic appointment, read your test results, renew a prescription or communicate with your care team.     To access your existing account, please contact your [facility] or call [phone] for assistance.        Care EveryWhere ID     This is your Care EveryWhere ID. This could be used by other organizations to access your [place] medical records  [id number]        Your Vitals Were     Pulse Last Period BMI (Body Mass Index)             106 03/01/2017 20.98 kg/m2          Blood Pressure from Last 3 Encounters:   09/01/17 122/84   07/27/17 133/86   05/18/17 125/79    Weight from Last 3 Encounters:   09/01/17 52 kg (114 lb 11.2 oz)   07/27/17 51.2 kg (112 lb 12.8 oz)   05/18/17 50.3 kg (110 lb 12.8 oz)              We Performed the Following     ALT     AST     Basic metabolic panel     Chlamydia by PCR     Creatinine urine calculation only     Gonorrhorea by PCR     Protein  random urine with Creat Ratio     TSH with free T4 reflex     Uric acid        Primary Care Provider Office Phone # Fax #    Noel Mayfield [phone]       [address] [place] [address]        Equal Access to Services     [facility]: Hadii jn Larry, waaxda luqadaha, qaybta kaaldelmar gross . So [facility] [phone].    ATENCIÓN: Si habla español, tiene a lopez disposición servicios gratuitos de asistencia  lingüísticaGodwin Starr al [phone].    We comply with applicable federal civil rights laws and [place] laws. We do not discriminate on the basis of race, color, national origin, age, disability sex, sexual orientation or gender identity.            Thank you!     Thank you for choosing [facility]  for your care. Our goal is always to provide you with excellent care. Hearing back from our patients is one way we can continue to improve our services. Please take a few minutes to complete the written survey that you may receive in the mail after your visit with us. Thank you!             Your Updated Medication List - Protect others around you: Learn how to safely use, store and throw away your medicines at www.disposemymeds.org.          This list is accurate as of: 9/1/17  1:04 PM.  Always use your most recent med list.                   Brand Name Dispense Instructions for use Diagnosis    prenatal multivitamin plus iron 27-0.8 MG Tabs per tablet      Take 1 tablet by mouth daily

## 2017-09-01 NOTE — LETTER
2017     RE: Kitty Ibarra  [address]  [facility] [address]     Dear Colleague,    Thank you for referring your patient, Kitty Ibarra, to the [facility] at [facility]. Please see a copy of my visit note below.      SUBJECTIVE:    42 year old, female, , 13w2d,  who presents to the clinic today for a new ob visit.    Feels well. Has  started PNV.  Estimated Date of Delivery: Mar 7, 2018   Mar 7, 2018 is calculated from Patient's last menstrual period was 2017..      She has not had bleeding since her LMP.   She has had mild nausea. Weight loss has not occurred.   This was a surprise pregnancy have not revealed to family yet    FOB is involved,       OTHER CONCERNS: plans repeat C/S as advised at 36 wks due to hx myomectomy     ===========================================  ROS  PSYCHIATRIC:  Denies mood changes  PHQ9: Last PHQ-9 score on record= No Value exists for the : HP#PHQ9  Social History   Substance Use Topics     Smoking status: Never Smoker     Smokeless tobacco: Never Used     Alcohol use No     History   Drug Use No     History   Smoking Status     Never Smoker   Smokeless Tobacco     Never Used       Alcohol use No     Family History   Problem Relation Age of Onset     Glaucoma Mother      Colon Cancer Paternal Grandmother      Abdominal Aortic Aneurysm Maternal Grandmother      Alzheimer Disease Maternal Grandfather      HEART DISEASE Maternal Grandfather      HEART DISEASE Paternal Grandfather      Stomach Cancer Paternal Grandfather      Family History Negative No family hx of      ============================================  MEDICAL HISTORY   Allergies   Allergen Reactions     Nkda [No Known Drug Allergies]        [unfilled]      Current Outpatient Prescriptions:      Prenatal Vit-Fe Fumarate-FA (PRENATAL MULTIVITAMIN  PLUS IRON) 27-0.8 MG TABS per tablet, Take 1 tablet by mouth  daily, Disp: , Rfl:     Past Medical History:   Diagnosis Date     NO ACTIVE PROBLEMS        Past Surgical History:   Procedure Laterality Date     BIOPSY BREAST  1991    benign      SECTION  ,      MYOMECTOMY UTERUS  2006    Uterine fibroids            Obstetric History       T0      L2     SAB0   TAB0   Ectopic0   Multiple0   Live Births2       # Outcome Date GA Lbr Hunter/2nd Weight Sex Delivery Anes PTL Lv   3 Current            2   36w0d  2.948 kg (6 lb 8 oz) M CS-LTranv   TONY      Name: Keegan   1   36w0d  2.523 kg (5 lb 9 oz) M CS-LTranv   TONY      Name: Daniele      Complications: Placenta previa      Obstetric Comments   No GDM, HTN, PPH.        GYN History- no Abnormal Pap Smears                        Cervical procedures: none                           I personally reviewed the past social/family/medical and surgical history on the date of service.   I reviewed lab work done at Intake visit with patient.    OBJECTIVE:   PHYSICAL EXAM:  /84 (BP Location: Left arm, Patient Position: Chair, Cuff Size: Adult Regular)  Pulse 106  Wt 52 kg (114 lb 11.2 oz)  LMP 2017  BMI 20.98 kg/m2  BMI- Body mass index is 20.98 kg/(m^2)., GENERAL:  Pleasant pregnant female, alert, cooperative  and well groomed.  SKIN:  Warm and dry, without lesions or rashes   NECK:  Thyroid without enlargement and nodules.  Lymph nodes not palpable.   LUNGS:  Clear to auscultation.  BREAST:    No dominant, fixed or suspicious masses are noted.  No skin or nipple changes or axillary nodes.   Nipples everted.      HEART:  RRR without murmur.  ABDOMEN: Soft without masses , tenderness or organomegaly.  No CVA tenderness.  Uterus palpable at size equal to dates.  Well healed scar from  section. Fetal heart tones present.  MUSCULOSKELETAL:  Full range of motion  EXTREMITIES:  No edema. No significant varicosities.   PELVIC EXAM: deferred     WET PREP:Not done  GC/CHLAMYDIA  CULTURE OBTAINED:YES    ASSESSMENT:  Intrauterine pregnancy 13w2d size  consistent with dates  Genetic Screening: First Trimester Screen  Encounter Diagnoses   Name Primary?      Yes     H/O myomectomy      Previous  times 2         PLAN:  Orders Placed This Encounter   Procedures     TSH with free T4 reflex     ALT     AST     Basic metabolic panel     Protein  random urine with Creat Ratio     Uric acid     Creatinine urine calculation only     - Reviewed use of triage nurse line and contacting the on-call provider after hours for an urgent need such as fever, vagina bleeding, bladder or vaginal infection, rupture of membranes,  or term labor.    - Reviewed best evidence for: weight gain for her weight and height for pregnancy:  RECOMMENDED WEIGHT GAIN: 25-35 lbs.   healthy diet and foods to avoid; exercise and activity during pregnancy;avoiding exposure to toxoplasmosis; and maintenance of a generally healthy lifestyle.   - Discussed the harms, benefits, side effects and alternative therapies for current prescribed and OTC medications.  - All pt's  questions discussed and answered.  Pt verbalized understanding of and agreement to plan of care.   - Continue scheduled prenatal care and prn if questions or concerns    HUNTER Calzada CNM

## 2017-09-01 NOTE — NURSING NOTE
Chief Complaint   Patient presents with     Prenatal Care     13 weeks and 2 days       Debbie Amado, CMA 9/1/2017

## 2017-09-01 NOTE — PROGRESS NOTES
SUBJECTIVE:    42 year old, female, , 13w2d,  who presents to the clinic today for a new ob visit.    Feels well. Has  started PNV.  Estimated Date of Delivery: Mar 7, 2018   Mar 7, 2018 is calculated from Patient's last menstrual period was 2017..      She has not had bleeding since her LMP.   She has had mild nausea. Weight loss has not occurred.   This was a surprise pregnancy have not revealed to family yet    FOB is involved,       OTHER CONCERNS: plans repeat C/S as advised at 36 wks due to hx myomectomy     ===========================================  ROS  PSYCHIATRIC:  Denies mood changes  PHQ9: Last PHQ-9 score on record= No Value exists for the : HP#PHQ9  Social History   Substance Use Topics     Smoking status: Never Smoker     Smokeless tobacco: Never Used     Alcohol use No     History   Drug Use No     History   Smoking Status     Never Smoker   Smokeless Tobacco     Never Used       Alcohol use No     Family History   Problem Relation Age of Onset     Glaucoma Mother      Colon Cancer Paternal Grandmother      Abdominal Aortic Aneurysm Maternal Grandmother      Alzheimer Disease Maternal Grandfather      HEART DISEASE Maternal Grandfather      HEART DISEASE Paternal Grandfather      Stomach Cancer Paternal Grandfather      Family History Negative No family hx of      ============================================  MEDICAL HISTORY   Allergies   Allergen Reactions     Nkda [No Known Drug Allergies]        [unfilled]      Current Outpatient Prescriptions:      Prenatal Vit-Fe Fumarate-FA (PRENATAL MULTIVITAMIN  PLUS IRON) 27-0.8 MG TABS per tablet, Take 1 tablet by mouth daily, Disp: , Rfl:     Past Medical History:   Diagnosis Date     NO ACTIVE PROBLEMS        Past Surgical History:   Procedure Laterality Date     BIOPSY BREAST  1991    benign      SECTION  ,      MYOMECTOMY UTERUS  2006    Uterine fibroids            Obstetric History       T0   P2    A0   L2     SAB0   TAB0   Ectopic0   Multiple0   Live Births2       # Outcome Date GA Lbr Hunter/2nd Weight Sex Delivery Anes PTL Lv   3 Current            2   36w0d  2.948 kg (6 lb 8 oz) M CS-LTranv   TONY      Name: Keegan   1   36w0d  2.523 kg (5 lb 9 oz) M CS-LTranv   TONY      Name: Daniele      Complications: Placenta previa      Obstetric Comments   No GDM, HTN, PPH.        GYN History- no Abnormal Pap Smears                        Cervical procedures: none                           I personally reviewed the past social/family/medical and surgical history on the date of service.   I reviewed lab work done at Intake visit with patient.    OBJECTIVE:   PHYSICAL EXAM:  /84 (BP Location: Left arm, Patient Position: Chair, Cuff Size: Adult Regular)  Pulse 106  Wt 52 kg (114 lb 11.2 oz)  LMP 2017  BMI 20.98 kg/m2  BMI- Body mass index is 20.98 kg/(m^2)., GENERAL:  Pleasant pregnant female, alert, cooperative  and well groomed.  SKIN:  Warm and dry, without lesions or rashes   NECK:  Thyroid without enlargement and nodules.  Lymph nodes not palpable.   LUNGS:  Clear to auscultation.  BREAST:    No dominant, fixed or suspicious masses are noted.  No skin or nipple changes or axillary nodes.   Nipples everted.      HEART:  RRR without murmur.  ABDOMEN: Soft without masses , tenderness or organomegaly.  No CVA tenderness.  Uterus palpable at size equal to dates.  Well healed scar from  section. Fetal heart tones present.  MUSCULOSKELETAL:  Full range of motion  EXTREMITIES:  No edema. No significant varicosities.   PELVIC EXAM: deferred     WET PREP:Not done  GC/CHLAMYDIA CULTURE OBTAINED:YES    ASSESSMENT:  Intrauterine pregnancy 13w2d size  consistent with dates  Genetic Screening: First Trimester Screen  Encounter Diagnoses   Name Primary?      Yes     H/O myomectomy      Previous  times 2         PLAN:  Orders Placed This Encounter   Procedures     TSH with free  T4 reflex     ALT     AST     Basic metabolic panel     Protein  random urine with Creat Ratio     Uric acid     Creatinine urine calculation only     - Reviewed use of triage nurse line and contacting the on-call provider after hours for an urgent need such as fever, vagina bleeding, bladder or vaginal infection, rupture of membranes,  or term labor.    - Reviewed best evidence for: weight gain for her weight and height for pregnancy:  RECOMMENDED WEIGHT GAIN: 25-35 lbs.   healthy diet and foods to avoid; exercise and activity during pregnancy;avoiding exposure to toxoplasmosis; and maintenance of a generally healthy lifestyle.   - Discussed the harms, benefits, side effects and alternative therapies for current prescribed and OTC medications.  - All pt's  questions discussed and answered.  Pt verbalized understanding of and agreement to plan of care.   - Continue scheduled prenatal care and prn if questions or concerns    HUNTER Calzada CNM

## 2017-09-03 LAB
C TRACH DNA SPEC QL NAA+PROBE: NEGATIVE
N GONORRHOEA DNA SPEC QL NAA+PROBE: NEGATIVE
SPECIMEN SOURCE: NORMAL
SPECIMEN SOURCE: NORMAL

## 2017-09-27 ENCOUNTER — OFFICE VISIT (OUTPATIENT)
Dept: OBGYN | Facility: CLINIC | Age: 42
End: 2017-09-27
Attending: OBSTETRICS & GYNECOLOGY
Payer: COMMERCIAL

## 2017-09-27 VITALS
SYSTOLIC BLOOD PRESSURE: 125 MMHG | BODY MASS INDEX: 21.75 KG/M2 | DIASTOLIC BLOOD PRESSURE: 81 MMHG | HEIGHT: 62 IN | HEART RATE: 75 BPM | WEIGHT: 118.2 LBS

## 2017-09-27 DIAGNOSIS — O09.91 HIGH-RISK PREGNANCY IN FIRST TRIMESTER: Primary | ICD-10-CM

## 2017-09-27 DIAGNOSIS — L20.84 INTRINSIC ECZEMA: ICD-10-CM

## 2017-09-27 PROBLEM — O09.522 ELDERLY MULTIGRAVIDA IN SECOND TRIMESTER: Status: ACTIVE | Noted: 2017-09-27

## 2017-09-27 RX ORDER — CLOBETASOL PROPIONATE 0.5 MG/G
CREAM TOPICAL
Qty: 45 G | Refills: 1 | Status: SHIPPED | OUTPATIENT
Start: 2017-09-27 | End: 2017-12-22

## 2017-09-27 NOTE — PROGRESS NOTES
"Subjective:      42 year old  at 17w0d based on 8w1d US with pregnancy complicated by hx of myomectomy which resulted in two previous  deliveries via C/S, presents for a routine prenatal appointment.      Denies vaginal bleeding or leakage of fluid.  No contractions or cramping.   Feels very gentle fetal movement.       No HA, visual changes, RUQ or epigastric pain.    She is taking prenatal vitamin and vitaD 5000 IU daily.   Has mild nausea in the morning. Complains about fatigue which she contributes to mild insomnia and busy schedule with her two children.   The patient presents with the following concerns: 1) itchyness on her left hand since  this year which can be quite intensive sometimes. Was prescribed with topical steroids but didn't use due to her concern of safety. 2) She also developed pimples on her chest and around her nipples without pruritis. 3) wonders if repeat  will be at 36w.   Level II US  Scheduled on 10/4/17.        Objective:  Vitals:    17 1053   BP: 125/81   Pulse: 75   Weight: 53.6 kg (118 lb 3.2 oz)   Height: 1.575 m (5' 2\")     See OB flowsheet    Gen: pleasant, well appearing, NAD  Skin: a patch of slightly erythematous and rough skin on the ventral surface of left hand, crust on nipples bilaterally  Chest: pink papules on chest and breast  Abdomen: soft, no tenderness    FH: 12 cm.   FHR: 140    Assessment/Plan    42 year old  at 17w0d based on 8w1d US with pregnancy complicated by hx of myomectomy which resulted in two previous  deliveries via C/S, presents for a routine prenatal appointment.  Her cell-free fetal DNA test was normal.     # Prenatal care  - Continue to take prenatal vitamin and vitaD as instructed  - will f/u level II US regarding fetal anatomy and placenta given her previous hx of placental/vasa previa    # Itchiness on left hand   Most likely due to eczema  - Topical corticosteroids    # Papules on chest  Most likely " normal changes with pregnancy. No concerns    #  planning  Will likely perform  at 36 week. Will discuss with patient regarding the exact date at next vist    - Reviewed total weight gain, encouraged continued healthy diet and exercise.      - Reviewed why/how to contact provider.     Return to clinic in one month and prn if questions or concerns.       IMichelle, MS3, am serving as a scribe on 17 to document services personally performed by Dr. Nieves, based on data collection and the provider's statements to me.

## 2017-09-27 NOTE — MR AVS SNAPSHOT
After Visit Summary   2017    Kitty Ibarra    MRN: [id number]           Patient Information     Date Of Birth          1975        Visit Information        Provider Department      2017 10:45 AM Karen Nieves MD [facility]        Today's Diagnoses     High-risk pregnancy in first trimester, , RICHARDSONS MD    -  1    Intrinsic eczema           Follow-ups after your visit        Your next 10 appointments already scheduled     Oct 04, 2017 11:45 AM CDT   MFM US COMP with [id number]   [facility])    [address]  [facility] [address] [phone]           Wear comfortable clothes and leave your valuables at home.            Oct 04, 2017 12:15 PM CDT   Radiology MD with UR MIKE PARIKH   [facility])    [address]  [facility] [address] [phone]           Please arrive at the time given for your first appointment. This visit is used internally to schedule the physician's time during your ultrasound.              Who to contact     Please call your clinic at [phone] to:    Ask questions about your health    Make or cancel appointments    Discuss your medicines    Learn about your test results    Speak to your doctor   If you have compliments or concerns about an experience at your clinic, or if you wish to file a complaint, please contact [facility] Patient Relations at [phone] or email us at [email or web address]         Additional Information About Your Visit        MyChart Information     [employer] gives you secure access to your electronic health record. If you see a primary care provider, you can also send messages to your care team and make appointments. If you have questions, please call your primary care  "clinic.  If you do not have a primary care provider, please call [phone] and they will assist you.      [employer] is an electronic gateway that provides easy, online access to your medical records. With [employer], you can request a clinic appointment, read your test results, renew a prescription or communicate with your care team.     To access your existing account, please contact your [facility] or call [phone] for assistance.        Care EveryWhere ID     This is your Care EveryWhere ID. This could be used by other organizations to access your [place] medical records  [id number]        Your Vitals Were     Pulse Height Last Period BMI (Body Mass Index)          75 1.575 m (5' 2\") 03/01/2017 21.62 kg/m2         Blood Pressure from Last 3 Encounters:   09/27/17 125/81   09/01/17 122/84   07/27/17 133/86    Weight from Last 3 Encounters:   09/27/17 53.6 kg (118 lb 3.2 oz)   09/01/17 52 kg (114 lb 11.2 oz)   07/27/17 51.2 kg (112 lb 12.8 oz)              Today, you had the following     No orders found for display         Today's Medication Changes          These changes are accurate as of: 9/27/17 11:59 PM.  If you have any questions, ask your nurse or doctor.               Start taking these medicines.        Dose/Directions    clobetasol 0.05 % cream   Commonly known as:  TEMOVATE   Used for:  Intrinsic eczema   Started by:  Karen Nieves MD        Apply sparingly to affected area twice daily as needed.  Do not apply to face.   Quantity:  45 g   Refills:  1            Where to get your medicines      These medications were sent to [facility] [id number]  [place] - [address]  [address] [place] [address]     Phone:  [phone]     clobetasol 0.05 % cream                Primary Care Provider Office Phone # Fax #    Noel Mayfield [phone]       [address] [place] [address]   "      Equal Access to Services     [facility] : Hadii aad ku hadjose armandoroseanne Larry, waamanda columbarubensha, qaneda jorgeyohandelmar prasad. [facility] [phone].    ATENCIÓN: Si habla español, tiene a lopez disposición servicios gratuitos de asistencia lingüística. Llame al [phone].    We comply with applicable federal civil rights laws and [place] laws. We do not discriminate on the basis of race, color, national origin, age, disability, sex, sexual orientation, or gender identity.            Thank you!     Thank you for choosing [facility]  for your care. Our goal is always to provide you with excellent care. Hearing back from our patients is one way we can continue to improve our services. Please take a few minutes to complete the written survey that you may receive in the mail after your visit with us. Thank you!             Your Updated Medication List - Protect others around you: Learn how to safely use, store and throw away your medicines at www.disposemymeds.org.          This list is accurate as of: 9/27/17 11:59 PM.  Always use your most recent med list.                   Brand Name Dispense Instructions for use Diagnosis    clobetasol 0.05 % cream    TEMOVATE    45 g    Apply sparingly to affected area twice daily as needed.  Do not apply to face.    Intrinsic eczema       prenatal multivitamin plus iron 27-0.8 MG Tabs per tablet      Take 1 tablet by mouth daily

## 2017-09-27 NOTE — LETTER
"2017       RE: Kitty Ibarra  [address] [facility] [address]     Dear Colleague,    Thank you for referring your patient, Kitty Ibarra, to the [facility] at [facility]. Please see a copy of my visit note below.    Subjective:      42 year old  at 17w0d based on 8w1d US with pregnancy complicated by hx of myomectomy which resulted in two previous  deliveries via C/S, presents for a routine prenatal appointment.      Denies vaginal bleeding or leakage of fluid.  No contractions or cramping.   Feels very gentle fetal movement.       No HA, visual changes, RUQ or epigastric pain.    She is taking prenatal vitamin and vitaD 5000 IU daily.   Has mild nausea in the morning. Complains about fatigue which she contributes to mild insomnia and busy schedule with her two children.   The patient presents with the following concerns: 1) itchyness on her left hand since  this year which can be quite intensive sometimes. Was prescribed with topical steroids but didn't use due to her concern of safety. 2) She also developed pimples on her chest and around her nipples without pruritis. 3) wonders if repeat  will be at 36w.   Level II US  Scheduled on 10/4/17.        Objective:  Vitals:    17 1053   BP: 125/81   Pulse: 75   Weight: 53.6 kg (118 lb 3.2 oz)   Height: 1.575 m (5' 2\")     See OB flowsheet    Gen: pleasant, well appearing, NAD  Skin: a patch of slightly erythematous and rough skin on the ventral surface of left hand, crust on nipples bilaterally  Chest: pink papules on chest and breast  Abdomen: soft, no tenderness    FH: 12 cm.   FHR: 140    Assessment/Plan    42 year old  at 17w0d based on 8w1d US with pregnancy complicated by hx of myomectomy which resulted in two previous  deliveries via C/S, presents for a routine prenatal appointment.  Her cell-free fetal DNA test was " normal.     # Prenatal care  - Continue to take prenatal vitamin and vitaD as instructed  - will f/u level II US regarding fetal anatomy and placenta given her previous hx of placental/vasa previa    # Itchiness on left hand   Most likely due to eczema  - Topical corticosteroids    # Papules on chest  Most likely normal changes with pregnancy. No concerns    #  planning  Will likely perform  at 36 week. Will discuss with patient regarding the exact date at next vist    - Reviewed total weight gain, encouraged continued healthy diet and exercise.      - Reviewed why/how to contact provider.     Return to clinic in one month and prn if questions or concerns.       I, Michelle Alex, MS3, am serving as a scribe on 17 to document services personally performed by Dr. Nieves, based on data collection and the provider's statements to me.         preg complicated by:  Patient Active Problem List   Diagnosis     Vitamin D deficiency     High-risk pregnancy in first trimester, , WHS MD     Previous  times 2     Intramural leiomyoma of uterus     H/O myomectomy     Vitamin D deficiency     AMA 43yo     Discussed timing of delivery.  Karen Nieves

## 2017-10-03 NOTE — PROGRESS NOTES
preg complicated by:  Patient Active Problem List   Diagnosis     Vitamin D deficiency     High-risk pregnancy in first trimester, , WHS MD     Previous  times 2     Intramural leiomyoma of uterus     H/O myomectomy     Vitamin D deficiency     AMA 43yo     Discussed timing of delivery.  Karen Nieves

## 2017-10-04 ENCOUNTER — HOSPITAL ENCOUNTER (OUTPATIENT)
Dept: ULTRASOUND IMAGING | Facility: CLINIC | Age: 42
Discharge: HOME | End: 2017-10-04
Attending: ADVANCED PRACTICE MIDWIFE | Admitting: ADVANCED PRACTICE MIDWIFE
Payer: COMMERCIAL

## 2017-10-04 ENCOUNTER — OFFICE VISIT (OUTPATIENT)
Dept: MATERNAL FETAL MEDICINE | Facility: CLINIC | Age: 42
End: 2017-10-04
Attending: ADVANCED PRACTICE MIDWIFE
Payer: COMMERCIAL

## 2017-10-04 DIAGNOSIS — O34.219 PREVIOUS CESAREAN DELIVERY, ANTEPARTUM CONDITION OR COMPLICATION: ICD-10-CM

## 2017-10-04 DIAGNOSIS — D25.1 INTRAMURAL LEIOMYOMA OF UTERUS: ICD-10-CM

## 2017-10-04 DIAGNOSIS — O09.91 HIGH-RISK PREGNANCY IN FIRST TRIMESTER: ICD-10-CM

## 2017-10-04 DIAGNOSIS — O09.522 ELDERLY MULTIGRAVIDA IN SECOND TRIMESTER: Primary | ICD-10-CM

## 2017-10-04 DIAGNOSIS — O26.90 PREGNANCY RELATED CONDITION, UNSPECIFIED TRIMESTER: ICD-10-CM

## 2017-10-04 PROCEDURE — 76811 OB US DETAILED SNGL FETUS: CPT

## 2017-10-04 NOTE — PROGRESS NOTES
"Please see \"Imaging\" tab under \"Chart Review\" for details of today's US.    Magda Michael, DO    "

## 2017-10-04 NOTE — MR AVS SNAPSHOT
After Visit Summary   10/4/2017    Kitty Ibarra    MRN: [id number]           Patient Information     Date Of Birth          1975        Visit Information        Provider Department      10/4/2017 12:15 PM Magda Michael, DO [facility] Maternal Fetal Medicine [facility]        Today's Diagnoses     Elderly multigravida in second trimester    -  1    High-risk pregnancy in first trimester        Previous  delivery, antepartum condition or complication        Intramural leiomyoma of uterus           Follow-ups after your visit        Who to contact     If you have questions or need follow up information about today's clinic visit or your schedule please contact [facility] MATERNAL FETAL MEDICINE [facility] directly at [phone].  Normal or non-critical lab and imaging results will be communicated to you by Bitglasshart, letter or phone within 4 business days after the clinic has received the results. If you do not hear from us within 7 days, please contact the clinic through Bitglasshart or phone. If you have a critical or abnormal lab result, we will notify you by phone as soon as possible.  Submit refill requests through GuestShots or call your pharmacy and they will forward the refill request to us. Please allow 3 business days for your refill to be completed.          Additional Information About Your Visit        MyChart Information     GuestShots gives you secure access to your electronic health record. If you see a primary care provider, you can also send messages to your care team and make appointments. If you have questions, please call your primary care clinic.  If you do not have a primary care provider, please call [phone] and they will assist you.        Care EveryWhere ID     This is your Care EveryWhere ID. This could be used by other organizations to access your [place] medical records  [id number]        Your Vitals Were     Last Period                    03/01/2017            Blood Pressure from Last 3 Encounters:   09/27/17 125/81   09/01/17 122/84   07/27/17 133/86    Weight from Last 3 Encounters:   09/27/17 53.6 kg (118 lb 3.2 oz)   09/01/17 52 kg (114 lb 11.2 oz)   07/27/17 51.2 kg (112 lb 12.8 oz)              Today, you had the following     No orders found for display       Primary Care Provider Office Phone # Fax #    Noel Mayfield [phone]       [address] [place] [address]        Equal Access to Services     [facility]: Hadii jn Larry, warosa buck, sharif iniguezmabeatrice rock, delmar sommers . So [facility] [phone].    ATENCIÓN: Si habla español, tiene a lopez disposición servicios gratuitos de asistencia lingüística. Ll[facility] [phone].    We comply with applicable federal civil rights laws and [place] laws. We do not discriminate on the basis of race, color, national origin, age, disability, sex, sexual orientation, or gender identity.            Thank you!     Thank you for choosing [facility] MATERNAL FETAL MEDICINE [facility]  for your care. Our goal is always to provide you with excellent care. Hearing back from our patients is one way we can continue to improve our services. Please take a few minutes to complete the written survey that you may receive in the mail after your visit with us. Thank you!             Your Updated Medication List - Protect others around you: Learn how to safely use, store and throw away your medicines at www.disposemymeds.org.          This list is accurate as of: 10/4/17  1:39 PM.  Always use your most recent med list.                   Brand Name Dispense Instructions for use Diagnosis    clobetasol 0.05 % cream    TEMOVATE    45 g    Apply sparingly to affected area twice daily as needed.  Do not apply to face.    Intrinsic eczema       prenatal multivitamin plus iron 27-0.8 MG Tabs per tablet      Take 1 tablet by mouth daily

## 2017-10-24 DIAGNOSIS — Z98.890 H/O MYOMECTOMY: Primary | ICD-10-CM

## 2017-10-26 ENCOUNTER — TELEPHONE (OUTPATIENT)
Dept: OBGYN | Facility: CLINIC | Age: 42
End: 2017-10-26

## 2017-10-26 NOTE — TELEPHONE ENCOUNTER
Confirmed c/section date, time and location 2/9/18 with arrival time at 8:30a.m with nothing to eat past midnight and clear liquids up to two hours before scheduled surgery time, also informed patient that c/section letter will be mailed out.     to complete the following fields:            CHECKLIST     Google Calendar : Yes     Resident notified: Not Applicable     Clinic schedule blocked: Not Applicable    Patient notified:Yes      Pre op information sent: Yes     Given to patient over the phone.Yes    Comments:

## 2017-11-06 ENCOUNTER — OFFICE VISIT (OUTPATIENT)
Dept: OBGYN | Facility: CLINIC | Age: 42
End: 2017-11-06
Attending: OBSTETRICS & GYNECOLOGY
Payer: COMMERCIAL

## 2017-11-06 VITALS
HEIGHT: 62 IN | WEIGHT: 121.7 LBS | HEART RATE: 111 BPM | SYSTOLIC BLOOD PRESSURE: 126 MMHG | BODY MASS INDEX: 22.39 KG/M2 | DIASTOLIC BLOOD PRESSURE: 80 MMHG

## 2017-11-06 DIAGNOSIS — O09.892 SUPERVISION OF OTHER HIGH RISK PREGNANCIES, SECOND TRIMESTER: ICD-10-CM

## 2017-11-06 DIAGNOSIS — D25.1 INTRAMURAL LEIOMYOMA OF UTERUS: Primary | ICD-10-CM

## 2017-11-06 DIAGNOSIS — Z23 NEED FOR PROPHYLACTIC VACCINATION AND INOCULATION AGAINST INFLUENZA: ICD-10-CM

## 2017-11-06 PROCEDURE — G0008 ADMIN INFLUENZA VIRUS VAC: HCPCS | Mod: ZF

## 2017-11-06 PROCEDURE — 99212 OFFICE O/P EST SF 10 MIN: CPT | Mod: 25,ZF

## 2017-11-06 PROCEDURE — 25000128 H RX IP 250 OP 636: Mod: ZF

## 2017-11-06 PROCEDURE — 90686 IIV4 VACC NO PRSV 0.5 ML IM: CPT | Mod: ZF

## 2017-11-06 RX ORDER — FAMOTIDINE 20 MG
TABLET ORAL
COMMUNITY
End: 2022-06-08

## 2017-11-06 ASSESSMENT — PAIN SCALES - GENERAL: PAINLEVEL: NO PAIN (0)

## 2017-11-06 NOTE — MR AVS SNAPSHOT
After Visit Summary   11/6/2017    Kitty Ibarra    MRN: [id number]           Patient Information     Date Of Birth          1975        Visit Information        Provider Department      11/6/2017 9:45 AM Omaira Siddiqui MD [facility]        Today's Diagnoses     Intramural leiomyoma of uterus    -  1    Need for prophylactic vaccination and inoculation against influenza        Supervision of other high risk pregnancies, second trimester           Follow-ups after your visit        Follow-up notes from your care team     Return in about 4 weeks (around 12/4/2017).      Your next 10 appointments already scheduled     Nov 20, 2017 10:00 AM CST   ULTRASOUND with [facility] ULTRASOUND   [facility] ([facility])    [facility] [address]  [facility] [address] [phone]            Dec 04, 2017  9:30 AM CST   RETURN OB with Omaira Siddiqui MD   [facility] ([facility])    [facility]  [address] [facility] [address] [phone]            Dec 20, 2017  9:30 AM CST   Return Obstetrics Extended with SHANNA Ponce   [facility] ([facility])    [facility]  [address]  [facility] [address] [phone]            Low 10, 2018  9:45 AM CST   RETURN OB with Karen Nieves MD   [facility] ([facility])    [facility]  [address]  [facility] [address] [phone]            Feb 09, 2018   Procedure with Karen Nieves MD   [facility] 4COB (--)    [address]  [facility] [id number] [phone]              Future tests that were ordered for you today     Open Future Orders        Priority Expected Expires Ordered     "Growth Ultrasound 38476 Routine  3/6/2018 11/6/2017            Who to contact     Please call your clinic at [phone] to:    Ask questions about your health    Make or cancel appointments    Discuss your medicines    Learn about your test results    Speak to your doctor   If you have compliments or concerns about an experience at your clinic, or if you wish to file a complaint, please contact [facility] Physicians Patient Relations at [phone] or email us at [email or web address]         Additional Information About Your Visit        [employer] Information     [employer] gives you secure access to your electronic health record. If you see a primary care provider, you can also send messages to your care team and make appointments. If you have questions, please call your primary care clinic.  If you do not have a primary care provider, please call [phone] and they will assist you.      [employer] is an electronic gateway that provides easy, online access to your medical records. With [employer], you can request a clinic appointment, read your test results, renew a prescription or communicate with your care team.     To access your existing account, please contact your [facility] or call [phone] for assistance.        Care EveryWhere ID     This is your Care EveryWhere ID. This could be used by other organizations to access your [place] medical records  [id number]        Your Vitals Were     Pulse Height Last Period Breastfeeding? BMI (Body Mass Index)       111 1.575 m (5' 2\") 03/01/2017 No 22.26 kg/m2        Blood Pressure from Last 3 Encounters:   11/06/17 126/80   09/27/17 125/81   09/01/17 122/84    Weight from Last 3 Encounters:   11/06/17 55.2 kg (121 lb 11.2 oz)   09/27/17 53.6 kg (118 lb 3.2 oz)   09/01/17 52 kg (114 lb 11.2 oz)              We Performed the Following     HC FLU VAC PRESRV FREE QUAD SPLIT VIR 3+YRS IM        Primary Care " Provider Office Phone # Fax #    Noel Mayfield [phone]       [address] [place] [address]        Equal Access to Services     LEONIE WREN : Hadii aad ku hadjose armandoroseanne Larry, valentinada columbarubensha, carenta kagerman rock, delmar simskathie luujessika fannyrenekate bell. So [facility] [phone].    ATENCIÓN: Si habla español, tiene a lopez disposición servicios gratuitos de asistencia lingüística. Llame al [phone].    We comply with applicable federal civil rights laws and [place] laws. We do not discriminate on the basis of race, color, national origin, age, disability, sex, sexual orientation, or gender identity.            Thank you!     Thank you for choosing [facility]  for your care. Our goal is always to provide you with excellent care. Hearing back from our patients is one way we can continue to improve our services. Please take a few minutes to complete the written survey that you may receive in the mail after your visit with us. Thank you!             Your Updated Medication List - Protect others around you: Learn how to safely use, store and throw away your medicines at www.disposemymeds.org.          This list is accurate as of: 11/6/17 11:59 PM.  Always use your most recent med list.                   Brand Name Dispense Instructions for use Diagnosis    clobetasol 0.05 % cream    TEMOVATE    45 g    Apply sparingly to affected area twice daily as needed.  Do not apply to face.    Intrinsic eczema       prenatal multivitamin plus iron 27-0.8 MG Tabs per tablet      Take 1 tablet by mouth daily        Vitamin D (Cholecalciferol) 1000 UNITS Caps

## 2017-11-06 NOTE — NURSING NOTE
Chief Complaint   Patient presents with     Prenatal Care     22w5d; flu shot today       See MERA Hilario 11/6/2017                Kitty Ibarra      1.  Has the patient received the information for the influenza vaccine? YES    2.  Does the patient have any of the following contraindications?     Allergy to eggs? No     Allergic reaction to previous influenza vaccines? No     Any other problems to previous influenza vaccines? No     Paralyzed by Guillain-Salisbury syndrome? No     Currently pregnant? Yes, 22 weeks gestation.     Current moderate or severe illness? No     Allergy to contact lens solution? No    3.  The vaccine has been administered in the usual fashion and the patient was instructed to wait 20 minutes before leaving the building in the event of an allergic reaction: YES    Vaccination given by See MERA Hilario .  Recorded by See Yanelis

## 2017-11-07 NOTE — PROGRESS NOTES
S:  Doing well, feeing much better than earlier in the pregnancy.  No contractions, feeling FM.    O: see flow    A:  41 y/o  at 22+5 weeks, doing well.  Pregnancy complicated by AMA, history of myomectomy with multiple fibroids currently, history of 2 late pre-term  deliveries 2/2 previous myomectomy    P:  Flu shot today.  Schedule follow up growth u/s at 24 weeks-plan q4 week assessment after that.  RTC 4 weeks.     Omaira Siddiqui MD, FACOG

## 2017-11-20 ENCOUNTER — OFFICE VISIT (OUTPATIENT)
Dept: OBGYN | Facility: CLINIC | Age: 42
End: 2017-11-20
Attending: OBSTETRICS & GYNECOLOGY
Payer: COMMERCIAL

## 2017-11-20 DIAGNOSIS — D25.1 INTRAMURAL LEIOMYOMA OF UTERUS: ICD-10-CM

## 2017-11-20 PROCEDURE — 76816 OB US FOLLOW-UP PER FETUS: CPT | Mod: ZF

## 2017-11-20 NOTE — MR AVS SNAPSHOT
After Visit Summary   11/20/2017    Kitty Ibarra    MRN: [id number]           Patient Information     Date Of Birth          1975        Visit Information        Provider Department      11/20/2017 10:00 AM [facility] ULTRASOUND [facility]        Today's Diagnoses     Intramural leiomyoma of uterus           Follow-ups after your visit        Your next 10 appointments already scheduled     Dec 04, 2017  9:30 AM CST   RETURN OB with Omaira Siddiqui MD   [facility] ([facility])    [facility] [address]  [address]  [facility] [address] [phone]            Dec 22, 2017  9:30 AM CST   ULTRASOUND with [facility] ULTRASOUND   [facility] ([facility])    [facility]  [address]  [facility] [address] [phone]            Dec 22, 2017 10:00 AM CST   Return Obstetrics Extended with HUNTER Bar CNM   [facility] ([facility])    [facility] [address]  [address]  [facility] [address] [phone]            Low 10, 2018  9:45 AM CST   RETURN OB with Karen Nieves MD   [facility] ([facility])    [facility] [address]  [address]  [facility] [address] [phone]            Feb 09, 2018   Procedure with Karen Nieves MD   [facility] 4[facility]B (--)    [address]  [facility] [address] [phone]              Who to contact     Please call your clinic at [phone] to:    Ask questions about your health    Make or cancel appointments    Discuss your medicines    Learn about your test results    Speak to your doctor   If you have compliments or concerns about an experience at your clinic, or if you wish to file a complaint, please contact [facility]  Physicians Patient Relations at [phone] or email us at [email or web address]         Additional Information About Your Visit        [employer] Information     [employer] gives you secure access to your electronic health record. If you see a primary care provider, you can also send messages to your care team and make appointments. If you have questions, please call your primary care clinic.  If you do not have a primary care provider, please call [phone] and they will assist you.      [employer] is an electronic gateway that provides easy, online access to your medical records. With [employer], you can request a clinic appointment, read your test results, renew a prescription or communicate with your care team.     To access your existing account, please contact your [facility] Clinic or call [phone] for assistance.        Care EveryWhere ID     This is your Care EveryWhere ID. This could be used by other organizations to access your [place] medical records  [id number]        Your Vitals Were     Last Period                   03/01/2017            Blood Pressure from Last 3 Encounters:   11/06/17 126/80   09/27/17 125/81   09/01/17 122/84    Weight from Last 3 Encounters:   11/06/17 55.2 kg (121 lb 11.2 oz)   09/27/17 53.6 kg (118 lb 3.2 oz)   09/01/17 52 kg (114 lb 11.2 oz)              We Performed the Following     Growth Ultrasound [address]        Primary Care Provider Office Phone # Fax #    Noelkathie Mayfield [phone]       [address] [place] [address]        Equal Access to Services     LEONIE WREN : Hadii jn brando Sosallie, waaxda luqadaha, qaybta kaalmada delmar rock. So [facility] [phone].    ATENCIÓN: Si habla español, tiene a lopez disposición servicios gratuitos de asistencia lingüística. Llame al [phone].    We comply with applicable federal civil rights laws and [place] laws. We  do not discriminate on the basis of race, color, national origin, age, disability, sex, sexual orientation, or gender identity.            Thank you!     Thank you for choosing [facility]  for your care. Our goal is always to provide you with excellent care. Hearing back from our patients is one way we can continue to improve our services. Please take a few minutes to complete the written survey that you may receive in the mail after your visit with us. Thank you!             Your Updated Medication List - Protect others around you: Learn how to safely use, store and throw away your medicines at www.disposemymeds.org.          This list is accurate as of: 11/20/17  3:48 PM.  Always use your most recent med list.                   Brand Name Dispense Instructions for use Diagnosis    clobetasol 0.05 % cream    TEMOVATE    45 g    Apply sparingly to affected area twice daily as needed.  Do not apply to face.    Intrinsic eczema       prenatal multivitamin plus iron 27-0.8 MG Tabs per tablet      Take 1 tablet by mouth daily        Vitamin D (Cholecalciferol) 1000 UNITS Caps

## 2017-11-20 NOTE — LETTER
2017       RE: Kitty Ibarra  [address]  [facility] [address]     Dear Colleague,    Thank you for referring your patient, Kitty Ibarra, to the [facility] at [facility]. Please see a copy of my visit note below.    42 year old female, ,presents at 24 5/7 weeks for obstetric ultrasound assessment indicated by AMA, h/o fibroids, myomectomy.    Single fetus    Presentation - breech    USEGA = 24 6/7 weeks.  EFW = 734 grams, 42 % for 24 weeks.      ELMIRA = 23.1cm  FHR = 141bpm     Placenta posterior and grade 0    Comments: Appropriate interval growth. Borderline polyhydramnios, consider early diabetes screening.     Findings discussed with patient.    Further studies- consider repeat growth in 4 weeks, weekly BPP at 37 weeks.        ERROL Domínguez MD

## 2017-11-20 NOTE — PROGRESS NOTES
42 year old female, ,presents at 24 5/7 weeks for obstetric ultrasound assessment indicated by AMA, h/o fibroids, myomectomy.    Single fetus    Presentation - breech    USEGA = 24 6/7 weeks.  EFW = 734 grams, 42 % for 24 weeks.      ELMIRA = 23.1cm  FHR = 141bpm     Placenta posterior and grade 0    Comments: Appropriate interval growth. Borderline polyhydramnios, consider early diabetes screening.     Findings discussed with patient.    Further studies- consider repeat growth in 4 weeks, weekly BPP at 37 weeks.        ERROL Domínguez MD

## 2017-12-04 ENCOUNTER — OFFICE VISIT (OUTPATIENT)
Dept: OBGYN | Facility: CLINIC | Age: 42
End: 2017-12-04
Attending: OBSTETRICS & GYNECOLOGY
Payer: COMMERCIAL

## 2017-12-04 VITALS
BODY MASS INDEX: 23.32 KG/M2 | DIASTOLIC BLOOD PRESSURE: 71 MMHG | WEIGHT: 126.7 LBS | SYSTOLIC BLOOD PRESSURE: 105 MMHG | HEART RATE: 116 BPM | HEIGHT: 62 IN

## 2017-12-04 DIAGNOSIS — O09.892 SUPERVISION OF OTHER HIGH RISK PREGNANCIES, SECOND TRIMESTER: Primary | ICD-10-CM

## 2017-12-04 PROCEDURE — 99212 OFFICE O/P EST SF 10 MIN: CPT | Mod: ZF

## 2017-12-04 ASSESSMENT — PAIN SCALES - GENERAL: PAINLEVEL: NO PAIN (0)

## 2017-12-04 NOTE — LETTER
2017       RE: Kitty Ibarra  [address]  [facility] [address]     Dear Colleague,    Thank you for referring your patient, Kitty Ibarra, to the [facility] at [facility]. Please see a copy of my visit note below.    S: Doing well, good FM, no cramping on contractions.  Planning to do GCT in 2 weeks with EOB-has to get to work today.    O: see flow    A:  42 y/p  at 26+5 weeks, doing well.  Pregnancy complicated by AMA, myomatous uterus with h/o myomectomy, previous c/s x 2.    P:  RTC 2 weeks for growth u/s, EOB with GCT.    Omaira Siddiqui MD, FACOG

## 2017-12-04 NOTE — PROGRESS NOTES
S: Doing well, good FM, no cramping on contractions.  Planning to do GCT in 2 weeks with EOB-has to get to work today.    O: see flow    A:  42 y/p  at 26+5 weeks, doing well.  Pregnancy complicated by AMA, myomatous uterus with h/o myomectomy, previous c/s x 2.    P:  RTC 2 weeks for growth u/s, EOB with GCT.    Omaira Siddiqui MD, FACOG

## 2017-12-04 NOTE — NURSING NOTE
Chief Complaint   Patient presents with     Prenatal Care     26w5d       See MERA Hilario 12/4/2017

## 2017-12-04 NOTE — MR AVS SNAPSHOT
After Visit Summary   12/4/2017    Kitty Ibarra    MRN: [id number]           Patient Information     Date Of Birth          1975        Visit Information        Provider Department      12/4/2017 9:30 AM Omaira Siddiqui MD [facility]        Today's Diagnoses     Supervision of other high risk pregnancies, second trimester    -  1       Follow-ups after your visit        Follow-up notes from your care team     Return in about 2 weeks (around 12/18/2017), or EOB with GCT in 2 weeks.      Your next 10 appointments already scheduled     Dec 22, 2017  9:30 AM CST   ULTRASOUND with [facility] ULTRASOUND   [facility] ([facility])    [facility] [address]  [facility] [id number] [phone]            Dec 22, 2017 10:00 AM CST   Return Obstetrics Extended with HUNTER Bar CNM   [facility] ([facility])    [facility] [address]  [address]  [facility] [id number] [phone]            Low 10, 2018  9:45 AM CST   RETURN OB with Karen Nieves MD   [facility] ([facility])    [facility] [address]  [address]  [facility] [address] [phone]            Feb 09, 2018   Procedure with Karen Nieves MD   UR 4COB (--)    [address] [facility] [id number] [phone]              Who to contact     Please call your clinic at [phone] to:    Ask questions about your health    Make or cancel appointments    Discuss your medicines    Learn about your test results    Speak to your doctor   If you have compliments or concerns about an experience at your clinic, or if you wish to file a complaint, please contact [facility] Patient Relations at [phone] or email us at  [email or web address]         Additional Information About Your Visit        Smarterphonehart Information     [employer] gives you secure access to your electronic health record. If you see a primary care provider, you can also send messages to your care team and make appointments. If you have questions, please call your primary care clinic.  If you do not have a primary care provider, please call [phone] and they will assist you.      [employer] is an electronic gateway that provides easy, online access to your medical records. With [employer], you can request a clinic appointment, read your test results, renew a prescription or communicate with your care team.     To access your existing account, please contact your [facility] or call [phone] for assistance.        Care EveryWhere ID     This is your Care EveryWhere ID. This could be used by other organizations to access your [place] medical records  [id number]        Your Vitals Were     Pulse Height Last Period Breastfeeding? BMI (Body Mass Index)       116 1.575 m (5' 2\") 03/01/2017 No 23.17 kg/m2        Blood Pressure from Last 3 Encounters:   12/04/17 105/71   11/06/17 126/80   09/27/17 125/81    Weight from Last 3 Encounters:   12/04/17 57.5 kg (126 lb 11.2 oz)   11/06/17 55.2 kg (121 lb 11.2 oz)   09/27/17 53.6 kg (118 lb 3.2 oz)              Today, you had the following     No orders found for display       Primary Care Provider Office Phone # Fax #    Noelkathie Mayfield [phone]       [address] [place] [address]        Equal Access to Services     [facility] : Hadii jn Larry, valentinada cielo, sharif patelaldelmar gross. So [facility] [phone].    ATENCIÓN: Si habla español, tiene a lopez disposición servicios gratuitos de asistencia lingüística. Matty abdul [phone].    We comply with applicable federal civil rights laws and " [place] laws. We do not discriminate on the basis of race, color, national origin, age, disability, sex, sexual orientation, or gender identity.            Thank you!     Thank you for choosing [facility]  for your care. Our goal is always to provide you with excellent care. Hearing back from our patients is one way we can continue to improve our services. Please take a few minutes to complete the written survey that you may receive in the mail after your visit with us. Thank you!             Your Updated Medication List - Protect others around you: Learn how to safely use, store and throw away your medicines at www.disposemymeds.org.          This list is accurate as of: 12/4/17 10:20 AM.  Always use your most recent med list.                   Brand Name Dispense Instructions for use Diagnosis    clobetasol 0.05 % cream    TEMOVATE    45 g    Apply sparingly to affected area twice daily as needed.  Do not apply to face.    Intrinsic eczema       prenatal multivitamin plus iron 27-0.8 MG Tabs per tablet      Take 1 tablet by mouth daily        Vitamin D (Cholecalciferol) 1000 UNITS Caps

## 2017-12-08 ENCOUNTER — HOSPITAL ENCOUNTER (OUTPATIENT)
Facility: CLINIC | Age: 42
Setting detail: OBSERVATION
Discharge: HOME | End: 2017-12-10
Attending: OBSTETRICS & GYNECOLOGY | Admitting: OBSTETRICS & GYNECOLOGY
Payer: COMMERCIAL

## 2017-12-08 ENCOUNTER — TELEPHONE (OUTPATIENT)
Dept: OBGYN | Facility: CLINIC | Age: 42
End: 2017-12-08

## 2017-12-08 PROBLEM — O47.00 PRETERM CONTRACTIONS: Status: ACTIVE | Noted: 2017-12-08

## 2017-12-08 PROBLEM — Z36.89 ENCOUNTER FOR TRIAGE IN PREGNANT PATIENT: Status: ACTIVE | Noted: 2017-12-08

## 2017-12-08 LAB
A1 MICROGLOB PLACENTAL VAG QL: NEGATIVE
ABO + RH BLD: NORMAL
ABO + RH BLD: NORMAL
ALBUMIN UR-MCNC: NEGATIVE MG/DL
ALT SERPL W P-5'-P-CCNC: 16 U/L (ref 0–50)
AMPHETAMINES UR QL SCN: NEGATIVE
APPEARANCE UR: CLEAR
AST SERPL W P-5'-P-CCNC: 12 U/L (ref 0–45)
BILIRUB UR QL STRIP: NEGATIVE
BLD GP AB SCN SERPL QL: NORMAL
BLOOD BANK CMNT PATIENT-IMP: NORMAL
CANNABINOIDS UR QL: NEGATIVE
COCAINE UR QL: NEGATIVE
COLOR UR AUTO: ABNORMAL
CREAT SERPL-MCNC: 0.45 MG/DL (ref 0.52–1.04)
CREAT UR-MCNC: 8 MG/DL
ERYTHROCYTE [DISTWIDTH] IN BLOOD BY AUTOMATED COUNT: 13 % (ref 10–15)
FIBRONECTIN FETAL VAG QL: NEGATIVE
GFR SERPL CREATININE-BSD FRML MDRD: >90 ML/MIN/1.7M2
GLUCOSE BLDC GLUCOMTR-MCNC: 87 MG/DL (ref 70–99)
GLUCOSE UR STRIP-MCNC: 300 MG/DL
HCT VFR BLD AUTO: 38.6 % (ref 35–47)
HGB BLD-MCNC: 12.7 G/DL (ref 11.7–15.7)
HGB UR QL STRIP: NEGATIVE
KETONES UR STRIP-MCNC: NEGATIVE MG/DL
LEUKOCYTE ESTERASE UR QL STRIP: NEGATIVE
MCH RBC QN AUTO: 30.2 PG (ref 26.5–33)
MCHC RBC AUTO-ENTMCNC: 32.9 G/DL (ref 31.5–36.5)
MCV RBC AUTO: 92 FL (ref 78–100)
NITRATE UR QL: NEGATIVE
OPIATES UR QL SCN: NEGATIVE
PCP UR QL SCN: NEGATIVE
PH UR STRIP: 6.5 PH (ref 5–7)
PLATELET # BLD AUTO: 167 10E9/L (ref 150–450)
RBC # BLD AUTO: 4.2 10E12/L (ref 3.8–5.2)
RBC #/AREA URNS AUTO: <1 /HPF (ref 0–2)
SOURCE: ABNORMAL
SP GR UR STRIP: 1 (ref 1–1.03)
SPECIMEN EXP DATE BLD: NORMAL
SPECIMEN SOURCE: NORMAL
UROBILINOGEN UR STRIP-MCNC: NORMAL MG/DL (ref 0–2)
WBC # BLD AUTO: 13.2 10E9/L (ref 4–11)
WBC #/AREA URNS AUTO: <1 /HPF (ref 0–2)
WET PREP SPEC: NORMAL

## 2017-12-08 PROCEDURE — 84460 ALANINE AMINO (ALT) (SGPT): CPT | Performed by: STUDENT IN AN ORGANIZED HEALTH CARE EDUCATION/TRAINING PROGRAM

## 2017-12-08 PROCEDURE — 86901 BLOOD TYPING SEROLOGIC RH(D): CPT | Performed by: STUDENT IN AN ORGANIZED HEALTH CARE EDUCATION/TRAINING PROGRAM

## 2017-12-08 PROCEDURE — 87210 SMEAR WET MOUNT SALINE/INK: CPT | Performed by: STUDENT IN AN ORGANIZED HEALTH CARE EDUCATION/TRAINING PROGRAM

## 2017-12-08 PROCEDURE — 96372 THER/PROPH/DIAG INJ SC/IM: CPT

## 2017-12-08 PROCEDURE — 96360 HYDRATION IV INFUSION INIT: CPT

## 2017-12-08 PROCEDURE — 87491 CHLMYD TRACH DNA AMP PROBE: CPT | Performed by: STUDENT IN AN ORGANIZED HEALTH CARE EDUCATION/TRAINING PROGRAM

## 2017-12-08 PROCEDURE — 00000146 ZZHCL STATISTIC GLUCOSE BY METER IP

## 2017-12-08 PROCEDURE — G0378 HOSPITAL OBSERVATION PER HR: HCPCS

## 2017-12-08 PROCEDURE — 86850 RBC ANTIBODY SCREEN: CPT | Performed by: STUDENT IN AN ORGANIZED HEALTH CARE EDUCATION/TRAINING PROGRAM

## 2017-12-08 PROCEDURE — 82731 ASSAY OF FETAL FIBRONECTIN: CPT | Performed by: STUDENT IN AN ORGANIZED HEALTH CARE EDUCATION/TRAINING PROGRAM

## 2017-12-08 PROCEDURE — 36415 COLL VENOUS BLD VENIPUNCTURE: CPT | Performed by: STUDENT IN AN ORGANIZED HEALTH CARE EDUCATION/TRAINING PROGRAM

## 2017-12-08 PROCEDURE — 25000128 H RX IP 250 OP 636: Performed by: OBSTETRICS & GYNECOLOGY

## 2017-12-08 PROCEDURE — 87591 N.GONORRHOEAE DNA AMP PROB: CPT | Performed by: STUDENT IN AN ORGANIZED HEALTH CARE EDUCATION/TRAINING PROGRAM

## 2017-12-08 PROCEDURE — 84112 EVAL AMNIOTIC FLUID PROTEIN: CPT | Performed by: OBSTETRICS & GYNECOLOGY

## 2017-12-08 PROCEDURE — 82565 ASSAY OF CREATININE: CPT | Performed by: STUDENT IN AN ORGANIZED HEALTH CARE EDUCATION/TRAINING PROGRAM

## 2017-12-08 PROCEDURE — 84450 TRANSFERASE (AST) (SGOT): CPT | Performed by: STUDENT IN AN ORGANIZED HEALTH CARE EDUCATION/TRAINING PROGRAM

## 2017-12-08 PROCEDURE — 25000128 H RX IP 250 OP 636: Performed by: STUDENT IN AN ORGANIZED HEALTH CARE EDUCATION/TRAINING PROGRAM

## 2017-12-08 PROCEDURE — 87653 STREP B DNA AMP PROBE: CPT | Performed by: STUDENT IN AN ORGANIZED HEALTH CARE EDUCATION/TRAINING PROGRAM

## 2017-12-08 PROCEDURE — 85027 COMPLETE CBC AUTOMATED: CPT | Performed by: STUDENT IN AN ORGANIZED HEALTH CARE EDUCATION/TRAINING PROGRAM

## 2017-12-08 PROCEDURE — 86900 BLOOD TYPING SEROLOGIC ABO: CPT | Performed by: STUDENT IN AN ORGANIZED HEALTH CARE EDUCATION/TRAINING PROGRAM

## 2017-12-08 PROCEDURE — 81001 URINALYSIS AUTO W/SCOPE: CPT | Performed by: STUDENT IN AN ORGANIZED HEALTH CARE EDUCATION/TRAINING PROGRAM

## 2017-12-08 PROCEDURE — 80307 DRUG TEST PRSMV CHEM ANLYZR: CPT | Performed by: STUDENT IN AN ORGANIZED HEALTH CARE EDUCATION/TRAINING PROGRAM

## 2017-12-08 RX ORDER — DEXTROSE, SODIUM CHLORIDE, SODIUM LACTATE, POTASSIUM CHLORIDE, AND CALCIUM CHLORIDE 5; .6; .31; .03; .02 G/100ML; G/100ML; G/100ML; G/100ML; G/100ML
INJECTION, SOLUTION INTRAVENOUS CONTINUOUS
Status: DISCONTINUED | OUTPATIENT
Start: 2017-12-08 | End: 2017-12-08

## 2017-12-08 RX ORDER — SODIUM CHLORIDE, SODIUM LACTATE, POTASSIUM CHLORIDE, CALCIUM CHLORIDE 600; 310; 30; 20 MG/100ML; MG/100ML; MG/100ML; MG/100ML
INJECTION, SOLUTION INTRAVENOUS CONTINUOUS
Status: DISCONTINUED | OUTPATIENT
Start: 2017-12-08 | End: 2017-12-10 | Stop reason: HOSPADM

## 2017-12-08 RX ORDER — BETAMETHASONE SODIUM PHOSPHATE AND BETAMETHASONE ACETATE 3; 3 MG/ML; MG/ML
12 INJECTION, SUSPENSION INTRA-ARTICULAR; INTRALESIONAL; INTRAMUSCULAR; SOFT TISSUE EVERY 24 HOURS
Status: COMPLETED | OUTPATIENT
Start: 2017-12-08 | End: 2017-12-09

## 2017-12-08 RX ADMIN — SODIUM CHLORIDE, POTASSIUM CHLORIDE, SODIUM LACTATE AND CALCIUM CHLORIDE: 600; 310; 30; 20 INJECTION, SOLUTION INTRAVENOUS at 20:32

## 2017-12-08 RX ADMIN — SODIUM CHLORIDE, SODIUM LACTATE, POTASSIUM CHLORIDE, CALCIUM CHLORIDE AND DEXTROSE MONOHYDRATE: 5; 600; 310; 30; 20 INJECTION, SOLUTION INTRAVENOUS at 19:05

## 2017-12-08 RX ADMIN — BETAMETHASONE SODIUM PHOSPHATE AND BETAMETHASONE ACETATE 12 MG: 3; 3 INJECTION, SUSPENSION INTRA-ARTICULAR; INTRALESIONAL; INTRAMUSCULAR at 18:03

## 2017-12-08 RX ADMIN — SODIUM CHLORIDE, POTASSIUM CHLORIDE, SODIUM LACTATE AND CALCIUM CHLORIDE 1000 ML: 600; 310; 30; 20 INJECTION, SOLUTION INTRAVENOUS at 17:52

## 2017-12-08 NOTE — H&P
[facility]  OB History and Physical      Kitty Ibarra MRN# [id number]   Age: 42 year old YOB: 1975     CC:  Contractions     HPI:  Ms. Kitty Ibarra is a 42 year old  at 27w2d by 8w1d US, who presents with intermittent cramping lower abdominal pain which she first noticed yesterday evening, and early this morning was more noticeable. Denies regular cramping pain, but occasionally notices She states she normally has some abdominal soreness and lower back soreness, and this is still present.  Denies vaginal bleeding, and loss of fluid.  Has normal small amount of vaginal discharge throughout pregnancy.  + normal fetal movement.  Denies symptoms of HA, vision changes, SOB, chest pain, fevers, chills, palpitations, nausea, vomiting, RUQ pain, dysuria, constipation, diarrhea.  is developing a cold, but patient herself has no symptoms of illness.     Pregnancy Complications:  1.  Hx of myomectomy  2.  Hx of planned  CS x 2  3.  AMA  4.  Hx of placenta previa     Prenatal Labs:   Lab Results   Component Value Date    ABO O 2017    RH  Pos 2017    AS Neg 2017    HEPBANG Nonreactive 2017    CHPCRT Negative 2017    GCPCRT Negative 2017    TREPAB Negative 2017    RPR Negative 2007    RUBELLAABIGG >500 2010    HGB 12.7 2017    HIV Negative 2010       GBS Status:   No results found for: GBS    Ultrasounds  Datinw1d  NT: 12w1d normal NT, nasal bone present. Fibroids: right lateral 6.5x5.8x5.8cm, two left lateral 2-4cm fibroids, posterior 4cm fibroid  Anatomy: 18w0d normal anatomy, placenta posterior, no previa, fibroids as above  Growth: 24w5d EFW 42%    OB History  Obstetric History       T0      L2     SAB0   TAB0   Ectopic0   Multiple0   Live Births2       # Outcome Date GA Lbr Hunter/2nd Weight Sex Delivery Anes PTL Lv   3 Current            2    36w0d  2.948 kg (6 lb 8 oz) M CS-LTranv   TONY      Name: Keegan   1   36w0d  2.523 kg (5 lb 9 oz) M CS-LTranv   TONY      Name: Daniele      Complications: Placenta previa      Obstetric Comments   No GDM, HTN, PPH.        PMHx:   Past Medical History:   Diagnosis Date     NO ACTIVE PROBLEMS      PSHx:   Past Surgical History:   Procedure Laterality Date     BIOPSY BREAST  1991    benign      SECTION  ,      MYOMECTOMY UTERUS  2006    Uterine fibroids     Meds:   Prescriptions Prior to Admission   Medication Sig Dispense Refill Last Dose     Vitamin D, Cholecalciferol, 1000 UNITS CAPS    Past Week at Unknown time     clobetasol (TEMOVATE) 0.05 % cream Apply sparingly to affected area twice daily as needed.  Do not apply to face. 45 g 1 Not Taking     Prenatal Vit-Fe Fumarate-FA (PRENATAL MULTIVITAMIN  PLUS IRON) 27-0.8 MG TABS per tablet Take 1 tablet by mouth daily   2017 at Unknown time     Allergies:    Allergies   Allergen Reactions     Nkda [No Known Drug Allergies]       FmHx:   Family History   Problem Relation Age of Onset     Glaucoma Mother      Colon Cancer Paternal Grandmother      Abdominal Aortic Aneurysm Maternal Grandmother      Alzheimer Disease Maternal Grandfather      HEART DISEASE Maternal Grandfather      HEART DISEASE Paternal Grandfather      Stomach Cancer Paternal Grandfather      Family History Negative No family hx of      SocHx: She denies any tobacco, alcohol, or other drug use during this pregnancy.    ROS:   Complete 10-point ROS negative except as noted in HPI. She denies headache, blurry vision, chest pain, shortness of breath, RUQ pain, nausea, vomiting, dysuria, hematuria or extremity edema.    PE:  Vit:   Patient Vitals for the past 4 hrs:   BP Resp   17 1635 (!) 134/92 20      Gen: Well-appearing, NAD, comfortable   CV: Tachycardic  Pulm: Breathing comfortably on RA  Abd: Soft, gravid, non-tender  Ext: trace LE edema b/l  Cx: Cl/L/H  (1715)  On speculum exam, thin white vaginal discharge in posterior vaginal vault.     Pres:  Cephalic by BSUS        FHT: Baseline 140bpm, modearte variability, + accelerations, no decelerations   [place]: 2-3 contractions in 10 minutes      Assessment  Ms. Kitty Ibarra is a 42 year old , at 27w2d by 8w1d US, who presents with  contractions.    Plan  -  contractions   - Admit to L&D for observation.    - Plan to recheck cervix to assess for  labor. Cervix cl/l/h at 1715   - Will administer BMZ today, second dose in 24 hours.    - Hx of CS x 2, plan for repeat CS if laboring. T&S obtained.    - FFN collected   -  labor work-up: wet prep, GC/CT, CBC, UA, UDS    - GBS collected   - Fen/GI: NPO, IVF (s/p bolus>D5W LR 100cc/hr)    - Membranes: Amnisure pending. No report of leakage of fluid however fluid noted in posterior vaginal vault.   - Placenta: posterior   - PNC:     - Rh positive. Rubella immune. GBS unknown.     - No GCT yet. FBG 87.     - Elevated BP   - One elevated BP in triage   - Will obtain HELLP labs, UPC    - No signs, symptoms of preE      -Fetal Well Being   - Category I FHT. Reactive and reassuring   - Cephalic by BSUS.    - Continue EFM and [place]        Jaimee Cardenas MD, MHS  OB/GYN Resident, PGY-2  2017 4:47 PM      I have seen and examined the patient with the resident. I agree with the findings, note, assessment and plan.   I have counseled the patient.   Karen Nieves

## 2017-12-08 NOTE — IP AVS SNAPSHOT
MRN:[id number]                      After Visit Summary   12/8/2017    Kitty Ibarra    MRN: [id number]           Thank you!     Thank you for choosing [facility] for your care. Our goal is always to provide you with excellent care. Hearing back from our patients is one way we can continue to improve our services. Please take a few minutes to complete the written survey that you may receive in the mail after you visit with us. Thank you!        Patient Information     Date Of Birth          1975        Designated Caregiver       Most Recent Value    Caregiver    Will someone help with your care after discharge? no      About your hospital stay     You were admitted on:  December 8, 2017 You last received care in the:  UR 4BOB    You were discharged on:  December 10, 2017       Who to Call     For medical emergencies, please call 911.  For non-urgent questions about your medical care, please call your primary care provider or clinic, [phone]          Attending Provider     Provider Specialty    Demi Mcleod MD OB/Gyn    Ezequiel, Karen Beard MD OB/Gyn       Primary Care Provider Office Phone # Fax #    Noel Mayfield [phone]      Your next 10 appointments already scheduled     Dec 22, 2017  9:30 AM CST   ULTRASOUND with [facility] ULTRASOUND   [facility] ([facility])    [facility] [address]  [address]  [address]  [facility] [address] [phone]            Dec 22, 2017 10:00 AM CST   Return Obstetrics Extended with HUNTER Bar CNM   [facility] ([facility])    [facility] [address]  [address]  [facility] [address] [phone]            Low 10, 2018  9:45 AM CST   RETURN OB with Karen Nieves MD   [facility] ([facility])    [facility] [address]  [address]  [address]  [facility] [id number] [phone]            2018   Procedure with Karen Nieves MD   UR 4COB (--)    [address] [facility]  [facility] [id number] [phone]              Further instructions from your care team       Discharge Instruction for Undelivered Patients      You were seen for:  labor assessment  We Consulted: MFM  You had (Test or Medicine): Fetal and uterine monitoring, Betamethasone x2    Diet:   Drink 8 to 12 glasses of liquids (milk, juice, water) every day.  You may eat meals and snacks.     Activity:  Rest the pelvic area. No sex. Do not stimulate breasts or nipples.  Count fetal kicks everyday (see handout)  Call your doctor or nurse midwife if your baby is moving less than usual.     Call your provider if you notice:  Swelling in your face or increased swelling in your hands or legs.  Headaches that are not relieved by Tylenol (acetaminophen).  Changes in your vision (blurring: seeing spots or stars.)  Nausea (sick to your stomach) and vomiting (throwing up).   Weight gain of 5 pounds or more per week.  Heartburn that doesn't go away.  Signs of bladder infection: pain when you urinate (use the toilet), need to go more often and more urgently.  The bag of campos (rupture of membranes) breaks, or you notice leaking in your underwear.  Bright red blood in your underwear.  Abdominal (lower belly) or stomach pain.  For first baby: Contractions (tightening) less than 5 minutes apart for one hour or more.  Second (plus) baby: Contractions (tightening) less than 10 minutes apart and getting stronger.  *If less than 34 weeks: Contractions (tightenings) more than 6 times in one hour.  Increase or change in vaginal discharge (note the color and amount)    Follow-up:    9:30 - 10 am          Pending Results     Date and Time Order Name Status Description    2017 1647 Chlamydia trachomatis PCR In process     2017 1647  Neisseria gonorrhoeae PCR In process             Statement of Approval     Ordered          12/10/17 0837  I have reviewed and agree with all the recommendations and orders detailed in this document.  EFFECTIVE NOW     Approved and electronically signed by:  Karen Nieves MD             Admission Information     Date & Time Provider Department Dept. Phone    12/8/2017 Karen Nieves MD UR 4BOB [phone]      Your Vitals Were     Blood Pressure Pulse Temperature Respirations Last Period       121/77 100 97.8  F (36.6  C) (Oral) 16 03/01/2017       MyChart Information     [employer] gives you secure access to your electronic health record. If you see a primary care provider, you can also send messages to your care team and make appointments. If you have questions, please call your primary care clinic.  If you do not have a primary care provider, please call [phone] and they will assist you.        Care EveryWhere ID     This is your Care EveryWhere ID. This could be used by other organizations to access your [facility] medical records  [id number]        Equal Access to Services     LEONIE WREN : Hadii aad ku hadasho Socynthiaali, waaxda luqadaha, qaybta kaalmada adeegyada, delmar sommers . So [facility] [phone].    ATENCIÓN: Si habla español, tiene a lopez disposición servicios gratuitos de asistencia lingüística. Llame al [phone].    We comply with applicable federal civil rights laws and [place] laws. We do not discriminate on the basis of race, color, national origin, age, disability, sex, sexual orientation, or gender identity.               Review of your medicines      UNREVIEWED medicines. Ask your doctor about these medicines        Dose / Directions    clobetasol 0.05 % cream   Commonly known as:  TEMOVATE   Used for:  Intrinsic eczema        Apply sparingly to affected area twice daily as needed.  Do not apply to face.   Quantity:  45 g   Refills:  1       prenatal  multivitamin plus iron 27-0.8 MG Tabs per tablet        Dose:  1 tablet   Take 1 tablet by mouth daily   Refills:  0       Vitamin D (Cholecalciferol) 1000 UNITS Caps        Refills:  0                Protect others around you: Learn how to safely use, store and throw away your medicines at www.disposemymeds.org.             Medication List: This is a list of all your medications and when to take them. Check marks below indicate your daily home schedule. Keep this list as a reference.      Medications           Morning Afternoon Evening Bedtime As Needed    clobetasol 0.05 % cream   Commonly known as:  TEMOVATE   Apply sparingly to affected area twice daily as needed.  Do not apply to face.                                prenatal multivitamin plus iron 27-0.8 MG Tabs per tablet   Take 1 tablet by mouth daily                                Vitamin D (Cholecalciferol) 1000 UNITS Caps

## 2017-12-08 NOTE — PLAN OF CARE
Patient arrived with reports of uterine cramping.  Urine collected and pt escorted to triage 2 for RN assessment.

## 2017-12-08 NOTE — TELEPHONE ENCOUNTER
Spoke with Kitty  Who is 27 weeks and experiencing period like cramping all day. She denies urinary symptoms or constipation. Has tried eating drinking and resting with no relief. Nurse advised going to BP for evaluation. Patient agreeable. L&D notified.

## 2017-12-08 NOTE — IP AVS SNAPSHOT
UR 4BOB    [address] [facility] [address]    [facility] [address]    Phone:  [phone]                                       After Visit Summary   12/8/2017    Kitty Ibarra    MRN: [id number]           After Visit Summary Signature Page     I have received my discharge instructions, and my questions have been answered. I have discussed any challenges I see with this plan with the nurse or doctor.    ..........................................................................................................................................  Patient/Patient Representative Signature      ..........................................................................................................................................  Patient Representative Print Name and Relationship to Patient    ..................................................               ................................................  Date                                            Time    ..........................................................................................................................................  Reviewed by Signature/Title    ...................................................              ..............................................  Date                                                            Time

## 2017-12-09 LAB
GP B STREP DNA SPEC QL NAA+PROBE: NEGATIVE
SPECIMEN SOURCE: NORMAL

## 2017-12-09 PROCEDURE — G0378 HOSPITAL OBSERVATION PER HR: HCPCS

## 2017-12-09 PROCEDURE — 25000128 H RX IP 250 OP 636: Performed by: STUDENT IN AN ORGANIZED HEALTH CARE EDUCATION/TRAINING PROGRAM

## 2017-12-09 PROCEDURE — 96372 THER/PROPH/DIAG INJ SC/IM: CPT

## 2017-12-09 PROCEDURE — 96361 HYDRATE IV INFUSION ADD-ON: CPT

## 2017-12-09 RX ADMIN — BETAMETHASONE SODIUM PHOSPHATE AND BETAMETHASONE ACETATE 12 MG: 3; 3 INJECTION, SUSPENSION INTRA-ARTICULAR; INTRALESIONAL; INTRAMUSCULAR at 17:56

## 2017-12-09 NOTE — PROGRESS NOTES
MFM Antepartum Progress Note    Subjective: Kitty is feeling well this morning. She was finally able to get some sleep between 0400 and 0600. She reports her contractions are persistent this morning but nonpainful and less intense than yesterday. She denies increased pelvic pressure, vaginal bleeding, leaking of fluid. Reports normal fetal movement. Otherwise doing well without complaints.       Objective:  Vitals:    17 1635 17 1848   BP: (!) 134/92 133/84   Resp: 20 18   Temp:  98.2  F (36.8  C)   TempSrc:  Oral       I/O last 3 completed shifts:  In: 1296 [I.V.:1296]  Out: -     Gen: Resting comfortably in bed, NAD  CV: Regular rate, well perfused  Resp: Normal respiratory effort  Abd: Gravid, non-tender, non-distended  Ext: non-tender, no edema    SVE: last performed early this AM, unchanged at C/L/H    FHT: , Moderate variability, present accelerations, absent decelerations  [place]: contractions 1 in 10 minutes or less    Assessment: Kitty Ibarra is a 42 year old  @ 27w3d by 8w1d US, admitted for observation in setting of  contractions, concern for  labor.    Plan:    #)  contractions  - Contractions improved since admission yesterday, patient appears comfortably  - SVE C/L/H x3 (last early this AM)  - S/p BMZ#1 at 1800 on , plan for second dose 24 hours to follow  - Infectious work up negative  - Given history of prior myomectomy,  delivery x2, if patient were to be in  labor, would plan delivery via  section    #) Fetal Wellbeing  - Category I tracing, reactive  - TID NSTs       Dispo: plan discharge later this evening following second dose of BMZ if remains clinically unchanged      Radha Patrick MD  OBGYN PGY-3  [phone]  7:31 AM 2017    Staff MD Note    I appreciate the note by Dr. Patrick.  Any necessary changes have been made by me.  I evaluated the patient with the resident and agree with the  assessment and plan.    Gloria Mayes MD

## 2017-12-09 NOTE — PROGRESS NOTES
S: still occasionally feeling contractions but not as often as monitor demonstrates.   Temp: 98.2  F (36.8  C) Temp src: Oral BP: 133/84     Resp: 18        EFM: category I  TOCO: Q 3 minutes  Cervix: closed/long/high/soft/vertex/mid    A:  contractions at 27 weeks without evidence for cervical change at this time  S/P first dose beta methasone  P: continue IVF hydration  Allow PO intake  Recheck cervix in 2 hours if contractions still > 6/hour on TOCO.    Karen Nieves

## 2017-12-09 NOTE — PLAN OF CARE
Problem: Patient Care Overview  Goal: Plan of Care/Patient Progress Review  Outcome: Improving  Pt continues to cx q1-4minutes, described a occasional cramping per pt. No change in intensity. Denies LOF or bleeding. FHR appropriate for gestational age (see Flowsheets for details). Cervix continues to be closed/long/high at 2219 per Dr. Villalpando. VSS. Will continue to monitor.

## 2017-12-09 NOTE — PROGRESS NOTES
Visited with patient. She is feeling about the same. No increase or change in contractions. Feeling baby move. FHT Cat 1, reactive. Contractions about q10 min. On repeat SVE no change, C/L/H soft, MP. Therefore do not feel there is indication for tocolysis at this time. FFN also negative and reassuring.     Josey Villalpando PGY3  12/8/2017 10:31 PM

## 2017-12-09 NOTE — DISCHARGE SUMMARY
Antepartum Discharge Summary  Kitty Ibarra   [id number]     Date of Admit: 2017   Date of DC: 12/10/2017     Admit Diagnosis:  -  at 27w2d   - Hx of abdominal myomectomy  - Hx of CS x2  - AMA  - Placenta previa     Discharge Diagnosis:  -  at 27w4d   - Hx of abdominal myomectomy  - Hx of CS x2  - AMA  - Placenta previa     Procedures:  BMZ x2    Brief History of Admission:  Ms. Kitty Ibarra is a 42 year old  at 27w2d by 8w1d US, who presents with intermittent cramping lower abdominal pain which she first noticed yesterday evening, and early this morning was more noticeable. Denies regular cramping pain, but occasionally notices She states she normally has some abdominal soreness and lower back soreness, and this is still present.  Denies vaginal bleeding, and loss of fluid.  Has normal small amount of vaginal discharge throughout pregnancy.  + normal fetal movement.  Denies symptoms of HA, vision changes, SOB, chest pain, fevers, chills, palpitations, nausea, vomiting, RUQ pain, dysuria, constipation, diarrhea.  is developing a cold, but patient herself has no symptoms of illness.      Hospital course:  Patient was placed on continuous monitoring and received BMZ. She had repeat SVE x4 and was unchanged over serial exams. She did not receive tocolysis or magnesium. Her FFN returned negative. She was hospitalized through her betamethasone window without worsening of contractions or change in her cervical exam. She had very rare variable decelerations noted on fetal monitoring but her tracing was otherwise category I, reassuring. She was discharged to home in stable condition on HD#3.     Admit medications:   Prior to Admission Medications   Prescriptions Last Dose Informant Patient Reported? Taking?   Prenatal Vit-Fe Fumarate-FA (PRENATAL MULTIVITAMIN  PLUS IRON) 27-0.8 MG TABS per tablet 2017 at Unknown time  Yes Yes   Sig: Take 1 tablet by mouth daily    Vitamin D, Cholecalciferol, 1000 UNITS CAPS Past Week at Unknown time  Yes Yes   clobetasol (TEMOVATE) 0.05 % cream   No Yes   Sig: Apply sparingly to affected area twice daily as needed.  Do not apply to face.      Facility-Administered Medications: None     Discharge medications:    clobetasol (TEMOVATE) 0.05 % cream  Apply sparingly to affected area twice daily as needed.  Do not apply to face.             Prenatal Vit-Fe Fumarate-FA (PRENATAL MULTIVITAMIN  PLUS IRON) 27-0.8 MG TABS per tablet  Take 1 tablet by mouth daily             Vitamin D, Cholecalciferol, 1000 UNITS CAPS                    Instructions:  Diet: Regular  Activity: As tolerated  Follow up: in clinic with primary OB within 1 week    Josey Villalpando PGY3  12/10/2017 10:39 AM     I have seen, examined and counseled the patient on the day of discharge. Discussed post operative instructions and medications. I have reviewed and edited the note.   Karen Nieves

## 2017-12-09 NOTE — PLAN OF CARE
Problem: Patient Care Overview  Goal: Plan of Care/Patient Progress Review  Outcome: No Change  Patient continues to contract but they are not painful and only feels some of them. Backache has resolved from yesterday. Will monitor this evening, check cervix and Betamethasone at 1800, and then probable discharge. Patient in agreement. Continue present cares.

## 2017-12-10 VITALS
DIASTOLIC BLOOD PRESSURE: 77 MMHG | TEMPERATURE: 97.8 F | HEART RATE: 100 BPM | RESPIRATION RATE: 16 BRPM | SYSTOLIC BLOOD PRESSURE: 121 MMHG

## 2017-12-10 PROCEDURE — G0378 HOSPITAL OBSERVATION PER HR: HCPCS

## 2017-12-10 NOTE — PLAN OF CARE
Problem: Patient Care Overview  Goal: Plan of Care/Patient Progress Review  Outcome: Adequate for Discharge Date Met: 12/10/17   Discharge instructions reviewed with patient and verbalized understanding. Has appointment in clinic on Wednesday. Knows number to call if has questions.

## 2017-12-10 NOTE — PROGRESS NOTES
S: no contractions or very few and slept well.   O: NST reassuring today with no decels. Only 2 contractions on TOCO this am.   Appears well.   /77  Pulse 100  Temp 97.8  F (36.6  C) (Oral)  Resp 16  LMP 2017  A: IUP at 27+3 in this . History of myomectomy and 2 scheduled  deliveries now s/p beta methasone and no evidence for labor.   P: D/C to home and follow up Weds with Dr. Nieves.     Call or return sooner if worsening symptoms.   Karen Nieves

## 2017-12-10 NOTE — DISCHARGE INSTRUCTIONS
Discharge Instruction for Undelivered Patients      You were seen for:  labor assessment  We Consulted: MFM  You had (Test or Medicine): Fetal and uterine monitoring, Betamethasone x2    Diet:   Drink 8 to 12 glasses of liquids (milk, juice, water) every day.  You may eat meals and snacks.     Activity:  Rest the pelvic area. No sex. Do not stimulate breasts or nipples.  Count fetal kicks everyday (see handout)  Call your doctor or nurse midwife if your baby is moving less than usual.     Call your provider if you notice:  Swelling in your face or increased swelling in your hands or legs.  Headaches that are not relieved by Tylenol (acetaminophen).  Changes in your vision (blurring: seeing spots or stars.)  Nausea (sick to your stomach) and vomiting (throwing up).   Weight gain of 5 pounds or more per week.  Heartburn that doesn't go away.  Signs of bladder infection: pain when you urinate (use the toilet), need to go more often and more urgently.  The bag of campos (rupture of membranes) breaks, or you notice leaking in your underwear.  Bright red blood in your underwear.  Abdominal (lower belly) or stomach pain.  For first baby: Contractions (tightening) less than 5 minutes apart for one hour or more.  Second (plus) baby: Contractions (tightening) less than 10 minutes apart and getting stronger.  *If less than 34 weeks: Contractions (tightenings) more than 6 times in one hour.  Increase or change in vaginal discharge (note the color and amount)    Follow-up:    9:30 - 10 am

## 2017-12-10 NOTE — PROGRESS NOTES
In to assess patient this evening. She reports ongoing contractions that are unchanged from earlier. Denies pelvic pressures, vaginal bleeding, leaking of fluid. Notes normal fetal movement. She is feeling very anxious and hypervigilant about monitoring her contractions. Of note, tracing has one variable lasting 1-2 minutes at approximately 1900, otherwise moderate variability with accelerations present and decelerations absent. BMZ#2 received at 1800 this evening. SVE performed and unchanged from prior C/L/H. Given category II tracing, patient anxiety, plan to keep until 24 hours following second dose of BMZ tomorrow evening.     Discussed with MD Radha Huynh MD  OBGYN PGY-3  [phone]  8:42 PM 12/9/2017

## 2017-12-10 NOTE — PLAN OF CARE
Problem:  Labor (Adult,Obstetrics,Pediatric)  Goal: Signs and Symptoms of Listed Potential Problems Will be Absent, Minimized or Managed ( Labor)  Signs and symptoms of listed potential problems will be absent, minimized or managed by discharge/transition of care (reference  Labor (Adult,Obstetrics,Pediatric) CPG).   Outcome: No Change  Contractions X 2 on am monitoring. NOT felt per patient, Slept.

## 2017-12-10 NOTE — PLAN OF CARE
"Problem: Patient Care Overview  Goal: Plan of Care/Patient Progress Review  Outcome: No Change  Data: Today is hospital day 2. Maternal status unchanged, patient continues to have contractions every 3-8 minutes and reports \"cramping\" in lower abdomen during contractiosn. Fetal assessment is WDL except for intermittant variable decelerations (see flowsheet), 1x 2minute late deceleration during shift, return to baseline. Moderate variability and accelerations present.  Patient up to shower during shift. Contraction discomfort does not seem to be affected by position changes.   Action: Continue with plan of care, which is to monitor for changes in maternal or fetal status. Patient encouraged to change positions frequently and notify nursing staff of any changes in comfort, contractions, or if loss of fluid or vaginal bleeding.   Response: Patient coping appropriate to situation.      "

## 2017-12-13 ENCOUNTER — OFFICE VISIT (OUTPATIENT)
Dept: OBGYN | Facility: CLINIC | Age: 42
End: 2017-12-13
Attending: OBSTETRICS & GYNECOLOGY
Payer: COMMERCIAL

## 2017-12-13 VITALS
WEIGHT: 124.1 LBS | BODY MASS INDEX: 22.7 KG/M2 | HEART RATE: 106 BPM | SYSTOLIC BLOOD PRESSURE: 116 MMHG | DIASTOLIC BLOOD PRESSURE: 79 MMHG

## 2017-12-13 DIAGNOSIS — O09.91 HIGH-RISK PREGNANCY IN FIRST TRIMESTER: Primary | ICD-10-CM

## 2017-12-13 DIAGNOSIS — O47.00 PRETERM CONTRACTIONS: ICD-10-CM

## 2017-12-13 PROBLEM — Z36.89 ENCOUNTER FOR TRIAGE IN PREGNANT PATIENT: Status: RESOLVED | Noted: 2017-12-08 | Resolved: 2017-12-13

## 2017-12-13 PROCEDURE — 99212 OFFICE O/P EST SF 10 MIN: CPT | Mod: ZF

## 2017-12-13 NOTE — LETTER
2017       RE: Kitty Ibarra  [address]  [facility] [address]     Dear Colleague,    Thank you for referring your patient, Kitty Ibarra, to the [facility] at [facility]. Please see a copy of my visit note below.    perg complicated by:  Patient Active Problem List   Diagnosis     Vitamin D deficiency     High-risk pregnancy in first trimester, , [facility] MD     Previous  times 2     Intramural leiomyoma of uterus     H/O myomectomy     Vitamin D deficiency     AMA 41yo     Encounter for triage in pregnant patient      contractions     States contractions since discharge have been minimal but intermittently present.   Will have EOB next Friday.   She did receive beta methasone over weekend while inpatient.       Again, thank you for allowing me to participate in the care of your patient.      Sincerely,    Karen Nieves MD

## 2017-12-13 NOTE — MR AVS SNAPSHOT
After Visit Summary   2017    Kitty Ibarra    MRN: [id number]           Patient Information     Date Of Birth          1975        Visit Information        Provider Department      2017 9:45 AM Karen Nieves MD [facility]        Today's Diagnoses     High-risk pregnancy in first trimester, , [facility] MD    -  1     contractions           Follow-ups after your visit        Your next 10 appointments already scheduled     Dec 22, 2017  9:30 AM CST   ULTRASOUND with [facility] ULTRASOUND   [facility] ([facility])    [facility] [address]  [facility] [address] [phone]            Dec 22, 2017 10:00 AM CST   Return Obstetrics Extended with [facility]   [facility] ([facility])    [facility] [address]  [address]  [facility] [id number] [phone]            Dec 29, 2017 12:45 PM CST   RETURN OB with Karen Nieves MD   [facility] ([facility])    [facility] [address]  [address]  [facility] [id number] [phone]            Low 10, 2018  9:45 AM CST   RETURN OB with Karen Nieves MD   [facility] ([facility])    [facility] [address]  [address]  [facility] [address] [phone]            2018   Procedure with Karen Nieves MD   [facility] (--)    [address]  [facility] [id number] [phone]              Who to contact     Please call your clinic at [phone] to:    Ask questions about your health    Make or cancel appointments    Discuss your medicines    Learn about your test results    Speak to your doctor   If you have compliments or concerns about an experience at your clinic, or if you wish to file a  complaint, please contact [facility] Patient Relations at [phone] or email us at [email or web address]         Additional Information About Your Visit        Ave[employer]inhart Information     [employer] gives you secure access to your electronic health record. If you see a primary care provider, you can also send messages to your care team and make appointments. If you have questions, please call your primary care clinic.  If you do not have a primary care provider, please call [phone] and they will assist you.      [email or web address] is an electronic gateway that provides easy, online access to your medical records. With [email or web address], you can request a clinic appointment, read your test results, renew a prescription or communicate with your care team.     To access your existing account, please contact your [facility] or call [phone] for assistance.        Care EveryWhere ID     This is your Care EveryWhere ID. This could be used by other organizations to access your [place] medical records  [id number]        Your Vitals Were     Pulse Last Period Breastfeeding? BMI (Body Mass Index)          106 03/01/2017 No 22.7 kg/m2         Blood Pressure from Last 3 Encounters:   12/13/17 116/79   12/10/17 121/77   12/04/17 105/71    Weight from Last 3 Encounters:   12/13/17 56.3 kg (124 lb 1.6 oz)   12/04/17 57.5 kg (126 lb 11.2 oz)   11/06/17 55.2 kg (121 lb 11.2 oz)              Today, you had the following     No orders found for display       Primary Care Provider Office Phone # Fax #    Noel Mayfield [phone]        [address] [place] [address]        Equal Access to Services     [facility] : Hadii jn Larry, eyad buck, sharif patelaldelmar gross . So [facility] [phone].    ATENCIÓN: Si habla español, tiene a lopez disposición servicios gratuitos de asistencia  lingüísticaGodwin Starr al [phone].    We comply with applicable federal civil rights laws and [place] laws. We do not discriminate on the basis of race, color, national origin, age, disability, sex, sexual orientation, or gender identity.            Thank you!     Thank you for choosing [facility]  for your care. Our goal is always to provide you with excellent care. Hearing back from our patients is one way we can continue to improve our services. Please take a few minutes to complete the written survey that you may receive in the mail after your visit with us. Thank you!             Your Updated Medication List - Protect others around you: Learn how to safely use, store and throw away your medicines at www.disposemymeds.org.          This list is accurate as of: 12/13/17 10:57 AM.  Always use your most recent med list.                   Brand Name Dispense Instructions for use Diagnosis    clobetasol 0.05 % cream    TEMOVATE    45 g    Apply sparingly to affected area twice daily as needed.  Do not apply to face.    Intrinsic eczema       prenatal multivitamin plus iron 27-0.8 MG Tabs per tablet      Take 1 tablet by mouth daily        Vitamin D (Cholecalciferol) 1000 UNITS Caps

## 2017-12-13 NOTE — PROGRESS NOTES
perg complicated by:  Patient Active Problem List   Diagnosis     Vitamin D deficiency     High-risk pregnancy in first trimester, , [facility] MD     Previous  times 2     Intramural leiomyoma of uterus     H/O myomectomy     Vitamin D deficiency     AMA 41yo     Encounter for triage in pregnant patient      contractions     States contractions since discharge have been minimal but intermittently present.   Will have EOB next Friday.   She did receive beta methasone over weekend while inpatient.   Karen Nieves

## 2017-12-13 NOTE — NURSING NOTE
Chief Complaint   Patient presents with     Prenatal Care     28w0d; Follow up  labor and hospital visit       See MERA Hilario 2017

## 2017-12-16 ENCOUNTER — HEALTH MAINTENANCE LETTER (OUTPATIENT)
Age: 42
End: 2017-12-16

## 2017-12-22 ENCOUNTER — OFFICE VISIT (OUTPATIENT)
Dept: OBGYN | Facility: CLINIC | Age: 42
End: 2017-12-22
Attending: ADVANCED PRACTICE MIDWIFE
Payer: COMMERCIAL

## 2017-12-22 VITALS
SYSTOLIC BLOOD PRESSURE: 119 MMHG | HEIGHT: 62 IN | HEART RATE: 112 BPM | DIASTOLIC BLOOD PRESSURE: 76 MMHG | WEIGHT: 125.6 LBS | BODY MASS INDEX: 23.11 KG/M2

## 2017-12-22 DIAGNOSIS — Z23 NEED FOR VACCINATION: ICD-10-CM

## 2017-12-22 DIAGNOSIS — Z98.890 H/O MYOMECTOMY: ICD-10-CM

## 2017-12-22 DIAGNOSIS — O09.522 ELDERLY MULTIGRAVIDA IN SECOND TRIMESTER: Primary | ICD-10-CM

## 2017-12-22 DIAGNOSIS — O34.219 PREVIOUS CESAREAN DELIVERY, ANTEPARTUM CONDITION OR COMPLICATION: ICD-10-CM

## 2017-12-22 DIAGNOSIS — O09.91 HIGH-RISK PREGNANCY IN FIRST TRIMESTER: Primary | ICD-10-CM

## 2017-12-22 LAB
DEPRECATED CALCIDIOL+CALCIFEROL SERPL-MC: 42 UG/L (ref 20–75)
ERYTHROCYTE [DISTWIDTH] IN BLOOD BY AUTOMATED COUNT: 13 % (ref 10–15)
GLUCOSE 1H P 50 G GLC PO SERPL-MCNC: 128 MG/DL (ref 60–129)
HCT VFR BLD AUTO: 37.4 % (ref 35–47)
HGB BLD-MCNC: 12.2 G/DL (ref 11.7–15.7)
MCH RBC QN AUTO: 30.2 PG (ref 26.5–33)
MCHC RBC AUTO-ENTMCNC: 32.6 G/DL (ref 31.5–36.5)
MCV RBC AUTO: 93 FL (ref 78–100)
PLATELET # BLD AUTO: 214 10E9/L (ref 150–450)
RBC # BLD AUTO: 4.04 10E12/L (ref 3.8–5.2)
WBC # BLD AUTO: 11.3 10E9/L (ref 4–11)

## 2017-12-22 PROCEDURE — 36415 COLL VENOUS BLD VENIPUNCTURE: CPT | Performed by: ADVANCED PRACTICE MIDWIFE

## 2017-12-22 PROCEDURE — 90471 IMMUNIZATION ADMIN: CPT | Mod: ZF

## 2017-12-22 PROCEDURE — 76816 OB US FOLLOW-UP PER FETUS: CPT | Mod: ZF

## 2017-12-22 PROCEDURE — 25000128 H RX IP 250 OP 636: Mod: ZF

## 2017-12-22 PROCEDURE — 40000809 ZZH STATISTIC NO DOCUMENTATION TO SUPPORT CHARGE

## 2017-12-22 PROCEDURE — 86780 TREPONEMA PALLIDUM: CPT | Performed by: ADVANCED PRACTICE MIDWIFE

## 2017-12-22 PROCEDURE — 82950 GLUCOSE TEST: CPT | Performed by: ADVANCED PRACTICE MIDWIFE

## 2017-12-22 PROCEDURE — 99211 OFF/OP EST MAY X REQ PHY/QHP: CPT | Mod: ZF

## 2017-12-22 PROCEDURE — 85027 COMPLETE CBC AUTOMATED: CPT | Performed by: ADVANCED PRACTICE MIDWIFE

## 2017-12-22 PROCEDURE — 82306 VITAMIN D 25 HYDROXY: CPT | Performed by: ADVANCED PRACTICE MIDWIFE

## 2017-12-22 PROCEDURE — 90715 TDAP VACCINE 7 YRS/> IM: CPT | Mod: ZF

## 2017-12-22 ASSESSMENT — ANXIETY QUESTIONNAIRES
6. BECOMING EASILY ANNOYED OR IRRITABLE: NOT AT ALL
3. WORRYING TOO MUCH ABOUT DIFFERENT THINGS: NOT AT ALL
5. BEING SO RESTLESS THAT IT IS HARD TO SIT STILL: NOT AT ALL
GAD7 TOTAL SCORE: 0
IF YOU CHECKED OFF ANY PROBLEMS ON THIS QUESTIONNAIRE, HOW DIFFICULT HAVE THESE PROBLEMS MADE IT FOR YOU TO DO YOUR WORK, TAKE CARE OF THINGS AT HOME, OR GET ALONG WITH OTHER PEOPLE: NOT DIFFICULT AT ALL
7. FEELING AFRAID AS IF SOMETHING AWFUL MIGHT HAPPEN: NOT AT ALL
1. FEELING NERVOUS, ANXIOUS, OR ON EDGE: NOT AT ALL
2. NOT BEING ABLE TO STOP OR CONTROL WORRYING: NOT AT ALL

## 2017-12-22 ASSESSMENT — PAIN SCALES - GENERAL: PAINLEVEL: NO PAIN (0)

## 2017-12-22 ASSESSMENT — PATIENT HEALTH QUESTIONNAIRE - PHQ9
SUM OF ALL RESPONSES TO PHQ QUESTIONS 1-9: 0
5. POOR APPETITE OR OVEREATING: NOT AT ALL

## 2017-12-22 NOTE — MR AVS SNAPSHOT
After Visit Summary   2017    Kitty Ibarra    MRN: [id number]           Patient Information     Date Of Birth          1975        Visit Information        Provider Department      2017 10:00 AM Karen Velazquez APRN [facility]        Today's Diagnoses     High-risk pregnancy in first trimester, , [facility] MD    -  1    Need for vaccination           Follow-ups after your visit        Follow-up notes from your care team     Return in about 2 weeks (around 2018).      Your next 10 appointments already scheduled     Dec 29, 2017 12:45 PM CST   RETURN OB with Karen Nieves MD   [facility] ([facility])    [facility] [address]  [address]  [facility] [address] [phone]            Low 10, 2018  9:45 AM CST   RETURN OB with Karen Nieves MD   [facility] ([facility])    [facility] [address]  [address]  [facility] [id number] [phone]            2018  3:00 PM CST   ULTRASOUND with [facility] ULTRASOUND   [facility] ([facility])    [facility] [address]  [address]  [facility] [address] [phone]            2018   Procedure with Karen Nieves MD   [facility] (--)    [address]  [facility] [id number] [phone]              Future tests that were ordered for you today     Open Standing Orders        Priority Remaining Interval Expires Ordered    Growth Ultrasound 74[address] Routine /3  2018 2017            Who to contact     Please call your clinic at [phone] to:    Ask questions about your health    Make or cancel appointments    Discuss your medicines    Learn about your test results    Speak to your doctor   If you have compliments or concerns about an experience at your clinic, or  "if you wish to file a complaint, please contact [facility] Patient Relations at [phone] or email us at [email or web address]         Additional Information About Your Visit        [employer]...Exactly Information     [email or web address] gives you secure access to your electronic health record. If you see a primary care provider, you can also send messages to your care team and make appointments. If you have questions, please call your primary care clinic.  If you do not have a primary care provider, please call [phone] and they will assist you.      [email or web address] is an electronic gateway that provides easy, online access to your medical records. With [email or web address], you can request a clinic appointment, read your test results, renew a prescription or communicate with your care team.     To access your existing account, please contact your [facility] or call [phone] for assistance.        Care EveryWhere ID     This is your Care EveryWhere ID. This could be used by other organizations to access your [place] medical records  [id number]        Your Vitals Were     Pulse Height Last Period BMI (Body Mass Index)          112 1.575 m (5' 2.01\") 03/01/2017 22.97 kg/m2         Blood Pressure from Last 3 Encounters:   12/22/17 119/76   12/13/17 116/79   12/10/17 121/77    Weight from Last 3 Encounters:   12/22/17 57 kg (125 lb 9.6 oz)   12/13/17 56.3 kg (124 lb 1.6 oz)   12/04/17 57.5 kg (126 lb 11.2 oz)              We Performed the Following     25- OH-Vitamin D     Anti Treponema     CBC with Platelets     Glucose 1 Hour     TDAP VACCINE (BOOSTRIX)          Today's Medication Changes          These changes are accurate as of: 12/22/17  1:13 PM.  If you have any questions, ask your nurse or doctor.               Stop taking these medicines if you haven't already. Please contact your care team if you have questions.     clobetasol 0.05 % cream   Commonly known as:  TEMOVATE "   Stopped by:  Karen Velazquez APRN CNM                    Primary Care Provider Office Phone # Fax #    Noel Mayfield [phone]       [address] [place] [address]        Equal Access to Services     LEONIE WREN : Hadii aad ku hadasho Soomaali, waaxda luqadaha, qaybta kaalmada adeegyada, waxay idiin hayaan adejessika khrenekate bell. So [facility] [phone].    ATENCIÓN: Si habla español, tiene a lopez disposición servicios gratuitos de asistencia lingüística. Llame al [phone].    We comply with applicable federal civil rights laws and [place] laws. We do not discriminate on the basis of race, color, national origin, age, disability, sex, sexual orientation, or gender identity.            Thank you!     Thank you for choosing [facility]  for your care. Our goal is always to provide you with excellent care. Hearing back from our patients is one way we can continue to improve our services. Please take a few minutes to complete the written survey that you may receive in the mail after your visit with us. Thank you!             Your Updated Medication List - Protect others around you: Learn how to safely use, store and throw away your medicines at www.disposemymeds.org.          This list is accurate as of: 12/22/17  1:13 PM.  Always use your most recent med list.                   Brand Name Dispense Instructions for use Diagnosis    prenatal multivitamin plus iron 27-0.8 MG Tabs per tablet      Take 1 tablet by mouth daily        Vitamin D (Cholecalciferol) 1000 UNITS Caps

## 2017-12-22 NOTE — MR AVS SNAPSHOT
After Visit Summary   2017    Kitty Ibarra    MRN: [id number]           Patient Information     Date Of Birth          1975        Visit Information        Provider Department      2017 9:30 AM [facility] ULTRASOUND [facility]        Today's Diagnoses     AMA 43yo    -  1    H/O myomectomy        Previous  times 2           Follow-ups after your visit        Your next 10 appointments already scheduled     Dec 29, 2017 12:45 PM CST   RETURN OB with Karen Nieves MD   [facility] ([facility])    [facility] [facility] [address]  [facility] [address]   [phone]            Low 10, 2018  9:45 AM CST   RETURN OB with Karen Nieves MD   [facility] ([facility])    [facility] [address]  [address]  [facility] [address] [phone]            2018  3:00 PM CST   ULTRASOUND with [facility] ULTRASOUND   [facility] ([facility])    [facility] [address]  [address]  [facility] [address] [phone]            2018   Procedure with Karen Nieves MD   UR 4COB (--)    [address]  [facility] [id number] [phone]              Future tests that were ordered for you today     Open Standing Orders        Priority Remaining Interval Expires Ordered    Growth Ultrasound 69970 Routine /3  2018 2017            Who to contact     Please call your clinic at [phone] to:    Ask questions about your health    Make or cancel appointments    Discuss your medicines    Learn about your test results    Speak to your doctor   If you have compliments or concerns about an experience at your clinic, or if you wish to file a complaint, please contact [facility] Patient Relations at [phone] or  email us at [email or web address]         Additional Information About Your Visit        Individual Digitalhart Information     BlazeMeter gives you secure access to your electronic health record. If you see a primary care provider, you can also send messages to your care team and make appointments. If you have questions, please call your primary care clinic.  If you do not have a primary care provider, please call [phone] and they will assist you.      BlazeM[employer] is an electronic gateway that provides easy, online access to your medical records. With BlazeMeter, you can request a clinic appointment, read your test results, renew a prescription or communicate with your care team.     To access your existing account, please contact your [facility] or call [phone] for assistance.        Care EveryWhere ID     This is your Care EveryWhere ID. This could be used by other organizations to access your [place] medical records  [id number]        Your Vitals Were     Last Period                   03/01/2017            Blood Pressure from Last 3 Encounters:   12/22/17 119/76   12/13/17 116/79   12/10/17 121/77    Weight from Last 3 Encounters:   12/22/17 57 kg (125 lb 9.6 oz)   12/13/17 56.3 kg (124 lb 1.6 oz)   12/04/17 57.5 kg (126 lb 11.2 oz)                 Today's Medication Changes          These changes are accurate as of: 12/22/17  3:05 PM.  If you have any questions, ask your nurse or doctor.               Stop taking these medicines if you haven't already. Please contact your care team if you have questions.     clobetasol 0.05 % cream   Commonly known as:  TEMOVATE   Stopped by:  Karen Velazquez APRN CNM                    Primary Care Provider Office Phone # Fax #    Noel Mayfield [phone]       [address] [place] [address]        Equal Access to Services     LEONIE WREN AH: eyad Garza qaybta  delmar oakesjessika bell. So [facility] [phone].    ATENCIÓN: Si sona rodriguez, tiene a lopez disposición servicios gratuitos de asistencia lingüística. Llame al [phone].    We comply with applicable federal civil rights laws and [place] laws. We do not discriminate on the basis of race, color, national origin, age, disability, sex, sexual orientation, or gender identity.            Thank you!     Thank you for choosing [facility]  for your care. Our goal is always to provide you with excellent care. Hearing back from our patients is one way we can continue to improve our services. Please take a few minutes to complete the written survey that you may receive in the mail after your visit with us. Thank you!             Your Updated Medication List - Protect others around you: Learn how to safely use, store and throw away your medicines at www.disposemymeds.org.          This list is accurate as of: 12/22/17  3:05 PM.  Always use your most recent med list.                   Brand Name Dispense Instructions for use Diagnosis    prenatal multivitamin plus iron 27-0.8 MG Tabs per tablet      Take 1 tablet by mouth daily        Vitamin D (Cholecalciferol) 1000 UNITS Caps

## 2017-12-22 NOTE — LETTER
2017       RE: Kitty Ibarra  [address]  [facility] [address]     Dear Colleague,    Thank you for referring your patient, Kitty Ibarra, to the [facility] at [facility]. Please see a copy of my visit note below.    42 year old, , 29w2d,   The patient presents with the following concerns: No new concerns. Plans repeat , scheduled.   Education completed today includes breast feeding, [facility] hand out , contraception, counting movements, signs of pre-term labor, when to present to birthplace, post partum depression and getting enough iron.  Birth preferences reviewed: Plans repeat  - would like Family Centered C/S with clear drape and skin to skin as soon as possible.   Labor support:     [place] Feeding plans : Breastfeeding    Contraception planned: Considering Tubal Ligation with  or vasectomy. Plans to discuss with .   The following labs were ordered today:   GCT, CBC w platelets, Vitamin d, Anti-treponema  Blood type:   ABO   Date Value Ref Range Status   2017 O  Final     RH(D)   Date Value Ref Range Status   2017 Pos  Final     Antibody Screen   Date Value Ref Range Status   2017 Neg  Final   Rhogam  was notgiven.  TDAP  Was given.  A/P:  Encounter Diagnosis   Name Primary?     Need for vaccination Yes     Orders Placed This Encounter   Procedures     TDAP VACCINE (BOOSTRIX)       Continue scheduled prenatal care    HUNTER Bar CNM

## 2017-12-22 NOTE — LETTER
2017       RE: Kitty Ibarra  [address]  [facility] [address]     Dear Colleague,    Thank you for referring your patient, Kitty Ibarra, to the [facility] at [facility]. Please see a copy of my visit note below.    42 year old female, ,presents at 29 2/7 weeks for obstetric ultrasound assessment indicated by AMA, h/o myomectomy.    Single fetus    Presentation - cephalic    USEGA = 29 4/7 weeks.  EFW = 1,437 grams, 51 % for 29 weeks.      ELMIRA = 19.7cm.  FHR = 127bpm     Placenta posterior and grade 0    Comments: AGA, normal ELMIRA.    Findings discussed with patient.    Continue q3-4 week growth us.    ERROL Domínguez MD MPH

## 2017-12-22 NOTE — PROGRESS NOTES
42 year old, , 29w2d,   The patient presents with the following concerns: No new concerns. Plans repeat , scheduled.   Education completed today includes breast feeding, [facility] hand out , contraception, counting movements, signs of pre-term labor, when to present to birthplace, post partum depression and getting enough iron.  Birth preferences reviewed: Plans repeat  - would like Family Centered C/S with clear drape and skin to skin as soon as possible.   Labor support:      Feeding plans : Breastfeeding    Contraception planned: Considering Tubal Ligation with  or vasectomy. Plans to discuss with .   The following labs were ordered today:   GCT, CBC w platelets, Vitamin d, Anti-treponema  Blood type:   ABO   Date Value Ref Range Status   2017 O  Final     RH(D)   Date Value Ref Range Status   2017 Pos  Final     Antibody Screen   Date Value Ref Range Status   2017 Neg  Final   Rhogam  was notgiven.  TDAP  Was given.  A/P:  Encounter Diagnosis   Name Primary?     Need for vaccination Yes     Orders Placed This Encounter   Procedures     TDAP VACCINE (BOOSTRIX)       Continue scheduled prenatal care  HUNTER Bar CNM

## 2017-12-22 NOTE — PROGRESS NOTES
42 year old female, ,presents at 29 2/7 weeks for obstetric ultrasound assessment indicated by AMA, h/o myomectomy.    Single fetus    Presentation - cephalic    USEGA = 29 4/7 weeks.  EFW = 1,437 grams, 51 % for 29 weeks.      ELMIRA = 19.7cm.  FHR = 127bpm     Placenta posterior and grade 0    Comments: AGA, normal ELMIRA.    Findings discussed with patient.    Continue q3-4 week growth us.    ERROL Domínguez MD MPH

## 2017-12-23 LAB — T PALLIDUM IGG+IGM SER QL: NEGATIVE

## 2017-12-23 ASSESSMENT — ANXIETY QUESTIONNAIRES: GAD7 TOTAL SCORE: 0

## 2017-12-29 ENCOUNTER — OFFICE VISIT (OUTPATIENT)
Dept: OBGYN | Facility: CLINIC | Age: 42
End: 2017-12-29
Attending: PHYSICAL MEDICINE & REHABILITATION
Payer: COMMERCIAL

## 2017-12-29 VITALS
DIASTOLIC BLOOD PRESSURE: 75 MMHG | BODY MASS INDEX: 23.55 KG/M2 | HEIGHT: 62 IN | SYSTOLIC BLOOD PRESSURE: 114 MMHG | WEIGHT: 128 LBS | HEART RATE: 120 BPM

## 2017-12-29 DIAGNOSIS — O09.91 HIGH-RISK PREGNANCY IN FIRST TRIMESTER: Primary | ICD-10-CM

## 2017-12-29 NOTE — MR AVS SNAPSHOT
After Visit Summary   2017    Kitty Ibarra    MRN: [id number]           Patient Information     Date Of Birth          1975        Visit Information        Provider Department      2017 12:45 PM Karen Nieves MD [facility]        Today's Diagnoses     High-risk pregnancy in first trimester, , [facility] MD    -  1       Follow-ups after your visit        Your next 10 appointments already scheduled     Low 10, 2018  9:45 AM CST   RETURN OB with Karen Nieves MD   [facility] ([facility])    [facility] [facility] [address]  [address]  [facility] [address] [phone]            2018  3:00 PM CST   ULTRASOUND with [facility] ULTRASOUND   [facility] ([facility])    [facility] [address]  [address]  [facility] [address] [phone]            2018   Procedure with Karen Nieves MD   UR 4COB (--)    [address]  [facility] [address] [phone]              Who to contact     Please call your clinic at [phone] to:    Ask questions about your health    Make or cancel appointments    Discuss your medicines    Learn about your test results    Speak to your doctor   If you have compliments or concerns about an experience at your clinic, or if you wish to file a complaint, please contact [facility] Patient Relations at [phone] or email us at [email or web address]         Additional Information About Your Visit        MyChart Information     eyesFinderhart gives you secure access to your electronic health record. If you see a primary care provider, you can also send messages to your care team and make appointments. If you have questions, please call your primary care clinic.  If you do not have a primary care provider, please call [phone] and they will  "assist you.      [employer] is an electronic gateway that provides easy, online access to your medical records. With [employer] you can request a clinic appointment, read your test results, renew a prescription or communicate with your care team.     To access your existing account, please contact your [facility] or call [phone] for assistance.        Care EveryWhere ID     This is your Care EveryWhere ID. This could be used by other organizations to access your [place] medical records  [id number]        Your Vitals Were     Pulse Height Last Period BMI (Body Mass Index)          120 1.575 m (5' 2.01\") 03/01/2017 23.4 kg/m2         Blood Pressure from Last 3 Encounters:   12/29/17 114/75   12/22/17 119/76   12/13/17 116/79    Weight from Last 3 Encounters:   12/29/17 58.1 kg (128 lb)   12/22/17 57 kg (125 lb 9.6 oz)   12/13/17 56.3 kg (124 lb 1.6 oz)              Today, you had the following     No orders found for display       Primary Care Provider Office Phone # Fax #    Noel Mayfield [phone]       [address] [place] [address]        Equal Access to Services     LEONIE WREN : Hadii jn bettencourt hadasho Soomaali, waaxda luqadaha, qaybta kaalmada adeegyada, delmar bell. So [facility] [phone].    ATENCIÓN: Si habla español, tiene a lopez disposición servicios gratuitos de asistencia lingüística. Llame al [phone].    We comply with applicable federal civil rights laws and [place] laws. We do not discriminate on the basis of race, color, national origin, age, disability, sex, sexual orientation, or gender identity.            Thank you!     Thank you for choosing [facility]  for your care. Our goal is always to provide you with excellent care. Hearing back from our patients is one way we can continue to improve our services. Please take a few minutes to complete the written survey that you may " receive in the mail after your visit with us. Thank you!             Your Updated Medication List - Protect others around you: Learn how to safely use, store and throw away your medicines at www.disposemymeds.org.          This list is accurate as of: 12/29/17 11:59 PM.  Always use your most recent med list.                   Brand Name Dispense Instructions for use Diagnosis    prenatal multivitamin plus iron 27-0.8 MG Tabs per tablet      Take 1 tablet by mouth daily        Vitamin D (Cholecalciferol) 1000 UNITS Caps

## 2017-12-29 NOTE — PROGRESS NOTES
Preg complicated by  contractions.   Patient Active Problem List   Diagnosis     Vitamin D deficiency     High-risk pregnancy in first trimester, , [facility] MD     Previous  times 2     Intramural leiomyoma of uterus     H/O myomectomy     Vitamin D deficiency     AMA 43yo      contractions     States contractions were nonexistent until last 2 days.   Cervix today closed/L/-2/post    Karen Nieves

## 2018-01-10 ENCOUNTER — OFFICE VISIT (OUTPATIENT)
Dept: OBGYN | Facility: CLINIC | Age: 43
End: 2018-01-10
Attending: OBSTETRICS & GYNECOLOGY
Payer: COMMERCIAL

## 2018-01-10 VITALS
BODY MASS INDEX: 24.31 KG/M2 | HEIGHT: 62 IN | WEIGHT: 132.1 LBS | DIASTOLIC BLOOD PRESSURE: 72 MMHG | HEART RATE: 114 BPM | SYSTOLIC BLOOD PRESSURE: 119 MMHG

## 2018-01-10 DIAGNOSIS — O09.93 SUPERVISION OF HIGH-RISK PREGNANCY, THIRD TRIMESTER: Primary | ICD-10-CM

## 2018-01-10 PROCEDURE — G0463 HOSPITAL OUTPT CLINIC VISIT: HCPCS | Mod: ZF

## 2018-01-10 NOTE — LETTER
"1/10/2018       RE: Kitty Ibarra  [address]  [facility] [address]     Dear Colleague,    Thank you for referring your patient, Kitty Ibarra, to the [facility] at [facility]. Please see a copy of my visit note below.    Return OB    S: Feeling well, no complaints today. Denies contractions, bleeding or LOF. Feeling a lot of fetal movement.    O:    Vitals:    01/10/18 1425   BP: 119/72   Pulse: 114   Weight: 59.9 kg (132 lb 1.6 oz)   Height: 1.575 m (5' 2.01\")     Gen: alert, oriented, NAD  See prenatal flowsheet    A/P:  42 year old  at 32w0d presenting for return OB visit; pregnancy complicated by history of  x2, uterine fibroids, history of myomectomy, and  contractions s/p betamethasone -    1. Routine prenatal  - Rh positive, rubella immune  - Passed GCT  - Normal cell free fetal DNA  - Anatomy US with multiple fibroids, posterior placenta, normal fetal anatomy  - Considering BTL for contraception- has private insurance  - Plans to breastfeed  - S/p Tdap and influenza vaccines    2. History of myomectomy,  x2  - Repeat  scheduled at 36 weeks    3. Fibroids  - Multiple- up to 7 cm right lateral  - Continue q4 week growth US; last   gm; next scheduled for     RTC 2 weeks      Again, thank you for allowing me to participate in the care of your patient.      Sincerely,    Demi Mcleod MD      "

## 2018-01-10 NOTE — MR AVS SNAPSHOT
After Visit Summary   1/10/2018    Kitty Ibarra    MRN: [id number]           Patient Information     Date Of Birth          1975        Visit Information        Provider Department      1/10/2018 2:15 PM Demi Mcleod MD [facility]        Today's Diagnoses     Supervision of high-risk pregnancy, third trimester    -  1       Follow-ups after your visit        Your next 10 appointments already scheduled     Jan 19, 2018  3:00 PM CST   ULTRASOUND with [facility] ULTRASOUND   [facility] ([facility])    [place] [facility] [address]  [address]  [facility] [id number] [phone]            Jan 19, 2018  3:30 PM CST   RETURN OB with Karen Nieves MD   [facility] ([facility])    [place] [facility] [address]  [address]  [address]  [facility] [address] [phone]            Feb 09, 2018   Procedure with Karen Nieves MD   UR 4COB (--)    [address]  [facility] [address] [phone]              Who to contact     Please call your clinic at [phone] to:    Ask questions about your health    Make or cancel appointments    Discuss your medicines    Learn about your test results    Speak to your doctor   If you have compliments or concerns about an experience at your clinic, or if you wish to file a complaint, please contact [facility] Patient Relations at [phone] or email us at [email or web address]         Additional Information About Your Visit        MyChart Information     Dezineforcehart gives you secure access to your electronic health record. If you see a primary care provider, you can also send messages to your care team and make appointments. If you have questions, please call your primary care clinic.  If you do not have a primary care provider, please call [phone] and they will  "assist you.      sendwithus is an electronic gateway that provides easy, online access to your medical records. With sendwithus, you can request a clinic appointment, read your test results, renew a prescription or communicate with your care team.     To access your existing account, please contact your [facility] or call [phone] for assistance.        Care EveryWhere ID     This is your Care EveryWhere ID. This could be used by other organizations to access your [place] medical records  [id number]        Your Vitals Were     Pulse Height Last Period BMI (Body Mass Index)          114 1.575 m (5' 2.01\") 03/01/2017 24.16 kg/m2         Blood Pressure from Last 3 Encounters:   01/10/18 119/72   12/29/17 114/75   12/22/17 119/76    Weight from Last 3 Encounters:   01/10/18 59.9 kg (132 lb 1.6 oz)   12/29/17 58.1 kg (128 lb)   12/22/17 57 kg (125 lb 9.6 oz)              Today, you had the following     No orders found for display       Primary Care Provider Office Phone # Fax #    Noel Mayfield [phone]       [address] [place] [address]        Equal Access to Services     LEONIE WREN : Hadii jn bettencourt hadasho Soomaali, waaxda luqadaha, qaybta kaalmada adeegyada, delmar sommers . So [facility] [phone].    ATENCIÓN: Si habla español, tiene a lopez disposición servicios gratuitos de asistencia lingüística. Llame al [phone].    We comply with applicable federal civil rights laws and [place] laws. We do not discriminate on the basis of race, color, national origin, age, disability, sex, sexual orientation, or gender identity.            Thank you!     Thank you for choosing [facility]  for your care. Our goal is always to provide you with excellent care. Hearing back from our patients is one way we can continue to improve our services. Please take a few minutes to complete the written survey that you may " receive in the mail after your visit with us. Thank you!             Your Updated Medication List - Protect others around you: Learn how to safely use, store and throw away your medicines at www.disposemymeds.org.          This list is accurate as of: 1/10/18  3:20 PM.  Always use your most recent med list.                   Brand Name Dispense Instructions for use Diagnosis    prenatal multivitamin plus iron 27-0.8 MG Tabs per tablet      Take 1 tablet by mouth daily        Vitamin D (Cholecalciferol) 1000 UNITS Caps

## 2018-01-10 NOTE — PROGRESS NOTES
"Return OB    S: Feeling well, no complaints today. Denies contractions, bleeding or LOF. Feeling a lot of fetal movement.    O:    Vitals:    01/10/18 1425   BP: 119/72   Pulse: 114   Weight: 59.9 kg (132 lb 1.6 oz)   Height: 1.575 m (5' 2.01\")     Gen: alert, oriented, NAD  See prenatal flowsheet    A/P:  42 year old  at 32w0d presenting for return OB visit; pregnancy complicated by history of  x2, uterine fibroids, history of myomectomy, and  contractions s/p betamethasone -    1. Routine prenatal  - Rh positive, rubella immune  - Passed GCT  - Normal cell free fetal DNA  - Anatomy US with multiple fibroids, posterior placenta, normal fetal anatomy  - Considering BTL for contraception- has private insurance  - Plans to breastfeed  - S/p Tdap and influenza vaccines    2. History of myomectomy,  x2  - Repeat  scheduled at 36 weeks    3. Fibroids  - Multiple- up to 7 cm right lateral  - Continue q4 week growth US; last   gm; next scheduled for     RTC 2 weeks    Demi Mcleod MD      "

## 2018-01-19 ENCOUNTER — OFFICE VISIT (OUTPATIENT)
Dept: OBGYN | Facility: CLINIC | Age: 43
End: 2018-01-19
Attending: OBSTETRICS & GYNECOLOGY
Payer: COMMERCIAL

## 2018-01-19 VITALS — SYSTOLIC BLOOD PRESSURE: 119 MMHG | DIASTOLIC BLOOD PRESSURE: 76 MMHG | HEIGHT: 62 IN | HEART RATE: 121 BPM

## 2018-01-19 DIAGNOSIS — Z98.890 H/O MYOMECTOMY: ICD-10-CM

## 2018-01-19 DIAGNOSIS — O34.219 PREVIOUS CESAREAN DELIVERY, ANTEPARTUM CONDITION OR COMPLICATION: ICD-10-CM

## 2018-01-19 DIAGNOSIS — O09.522 ELDERLY MULTIGRAVIDA IN SECOND TRIMESTER: ICD-10-CM

## 2018-01-19 DIAGNOSIS — O09.91 HIGH-RISK PREGNANCY IN FIRST TRIMESTER: Primary | ICD-10-CM

## 2018-01-19 PROCEDURE — G0463 HOSPITAL OUTPT CLINIC VISIT: HCPCS

## 2018-01-19 PROCEDURE — 76816 OB US FOLLOW-UP PER FETUS: CPT | Mod: ZF

## 2018-01-19 NOTE — MR AVS SNAPSHOT
After Visit Summary   2018    Kitty Ibarra    MRN: [id number]           Patient Information     Date Of Birth          1975        Visit Information        Provider Department      2018 3:30 PM Karen Nieves MD [facility]        Today's Diagnoses     High-risk pregnancy in first trimester, , [facility] MD    -  1       Follow-ups after your visit        Your next 10 appointments already scheduled     2018  9:30 AM CST   ULTRASOUND with [facility] ULTRASOUND   [facility] ([facility])    [place] [facility]  [facility] [address] [phone]            2018 10:00 AM CST   RETURN OB with Karen Nieves MD   [facility] ([facility])    [place] [facility] [address]  [facility] [address] [phone]            2018  1:00 PM CST   ULTRASOUND with [facility] ULTRASOUND   [facility] ([facility])    [place] [facility] [address]  [facility] [address] [phone]            2018  1:30 PM CST   RETURN OB with Karen Nieves MD   [facility] ([facility])    [place] [facility] [address]  [facility] [address] [phone]            2018   Procedure with Karen Nieves MD   UR 4COB (--)    [address]  [facility] [id number] [phone]              Who to contact     Please call your clinic at [phone] to:    Ask questions about your health    Make or cancel appointments    Discuss your medicines    Learn about your test results    Speak to your doctor   If you have compliments or concerns about an experience at your clinic, or if you wish to file a complaint, please contact [facility]  "Patient Relations at [phone] or email us at [email or web address]         Additional Information About Your Visit        Niftihart Information     Stiki Digitalt gives you secure access to your electronic health record. If you see a primary care provider, you can also send messages to your care team and make appointments. If you have questions, please call your primary care clinic.  If you do not have a primary care provider, please call [phone] and they will assist you.      Search Initiatives is an electronic gateway that provides easy, online access to your medical records. With Search Initiatives, you can request a clinic appointment, read your test results, renew a prescription or communicate with your care team.     To access your existing account, please contact your [facility] or call [phone] for assistance.        Care EveryWhere ID     This is your Care EveryWhere ID. This could be used by other organizations to access your [place] medical records  [id number]        Your Vitals Were     Pulse Height Last Period             121 1.575 m (5' 2.01\") 03/01/2017          Blood Pressure from Last 3 Encounters:   01/19/18 119/76   01/10/18 119/72   12/29/17 114/75    Weight from Last 3 Encounters:   01/10/18 59.9 kg (132 lb 1.6 oz)   12/29/17 58.1 kg (128 lb)   12/22/17 57 kg (125 lb 9.6 oz)              Today, you had the following     No orders found for display       Primary Care Provider Office Phone # Fax #    Noel Mayfield [phone]       [address] [place] [address]        Equal Access to Services     [facility] SIENA : Hadii jn ku hadasho Sosallie, waaxda luqadaha, qaybta kaalmada delmar rock. So [facility] [phone].    ATENCIÓN: Si habla español, tiene a lopez disposición servicios gratuitos de asistencia lingüística. Llame al [phone].    We comply with applicable federal civil rights laws and " [place] laws. We do not discriminate on the basis of race, color, national origin, age, disability, sex, sexual orientation, or gender identity.            Thank you!     Thank you for choosing [facility]  for your care. Our goal is always to provide you with excellent care. Hearing back from our patients is one way we can continue to improve our services. Please take a few minutes to complete the written survey that you may receive in the mail after your visit with us. Thank you!             Your Updated Medication List - Protect others around you: Learn how to safely use, store and throw away your medicines at www.disposemymeds.org.          This list is accurate as of: 1/19/18  3:52 PM.  Always use your most recent med list.                   Brand Name Dispense Instructions for use Diagnosis    prenatal multivitamin plus iron 27-0.8 MG Tabs per tablet      Take 1 tablet by mouth daily        Vitamin D (Cholecalciferol) 1000 UNITS Caps

## 2018-01-19 NOTE — MR AVS SNAPSHOT
After Visit Summary   2018    Kitty Ibarra    MRN: [id number]           Patient Information     Date Of Birth          1975        Visit Information        Provider Department      2018 3:00 PM [facility] ULTRASOUND [facility]        Today's Diagnoses     AMA 43yo        H/O myomectomy        Previous  times 2           Follow-ups after your visit        Your next 10 appointments already scheduled     2018  9:30 AM CST   ULTRASOUND with [facility] ULTRASOUND   [facility] ([facility])    [place] [facility] [address]  [address]  [facility] [address]   [phone]            2018 10:00 AM CST   RETURN OB with Karen Nieves MD   [facility] ([facility])    [place] [facility] [address]  [address]  [facility] [address] [phone]            2018  1:00 PM CST   ULTRASOUND with [facility] ULTRASOUND   [facility] ([facility])    [place] [facility] [address]  [address]  [facility] [address] [phone]            2018  1:30 PM CST   RETURN OB with Karen Nieves MD   [facility] ([facility])    [place] [facility] [address]  [address]  [facility] [address] [phone]            2018   Procedure with Karen Nieves MD   UR 4COB (--)    [address]  [facility] [address] [phone]              Who to contact     Please call your clinic at [phone] to:    Ask questions about your health    Make or cancel appointments    Discuss your medicines    Learn about your test results    Speak to your doctor   If you have compliments or concerns about an experience at your clinic, or if you wish to file a complaint, please contact [facility]  Patient Relations at [phone] or email us at [email or web address]         Additional Information About Your Visit        [email or web address] Information     StrongView gives you secure access to your electronic health record. If you see a primary care provider, you can also send messages to your care team and make appointments. If you have questions, please call your primary care clinic.  If you do not have a primary care provider, please call [phone] and they will assist you.      StrongView is an electronic gateway that provides easy, online access to your medical records. With StrongView, you can request a clinic appointment, read your test results, renew a prescription or communicate with your care team.     To access your existing account, please contact your [facility] or call [phone] for assistance.        Care EveryWhere ID     This is your Care EveryWhere ID. This could be used by other organizations to access your [place] medical records  [id number]        Your Vitals Were     Last Period                   03/01/2017            Blood Pressure from Last 3 Encounters:   01/19/18 119/76   01/10/18 119/72   12/29/17 114/75    Weight from Last 3 Encounters:   01/10/18 59.9 kg (132 lb 1.6 oz)   12/29/17 58.1 kg (128 lb)   12/22/17 57 kg (125 lb 9.6 oz)              We Performed the Following     [facility]trasound [address]        Primary Care Provider Office Phone # Fax #    Noel Mayfield [phone]       0 [address] [place] [address]        Equal Access to Services     LEONIE WREN : Hadii aad ku hadasho Soomaali, waaxda luqadaha, qaybta kaalmada adeegyada, delmar bell. So [facility] [phone].    ATENCIÓN: Si habla español, tiene a lopez disposición servicios gratuitos de asistencia lingüística. Llame al [phone].    We comply with applicable federal civil rights laws and [place] laws. We do not discriminate  on the basis of race, color, national origin, age, disability, sex, sexual orientation, or gender identity.            Thank you!     Thank you for choosing [facility]  for your care. Our goal is always to provide you with excellent care. Hearing back from our patients is one way we can continue to improve our services. Please take a few minutes to complete the written survey that you may receive in the mail after your visit with us. Thank you!             Your Updated Medication List - Protect others around you: Learn how to safely use, store and throw away your medicines at www.disposemymeds.org.          This list is accurate as of: 1/19/18  5:01 PM.  Always use your most recent med list.                   Brand Name Dispense Instructions for use Diagnosis    prenatal multivitamin plus iron 27-0.8 MG Tabs per tablet      Take 1 tablet by mouth daily        Vitamin D (Cholecalciferol) 1000 UNITS Caps

## 2018-01-19 NOTE — PROGRESS NOTES
42 year old female, , presents at 33 2/7 weeks for obstetric ultrasound assessment indicated by AMA, h/o myomectomy.    Single fetus    Presentation cephalic    USEGA = 33 2/7 weeks.  EFW = 2387 grams, 71 % for 33 weeks.      ELMIRA = 19.1cm.  FHR = 133bpm     Placenta posterior and grade 0    Findings discussed with patient.    Weekly BPP for AMA.    ERROL Hector MD MPH

## 2018-01-19 NOTE — PROGRESS NOTES
Doing well. No contractions.   preg complicated by:  Patient Active Problem List   Diagnosis     Vitamin D deficiency     High-risk pregnancy in first trimester, , [facility] MD     Previous  times 2     Intramural leiomyoma of uterus     H/O myomectomy     Vitamin D deficiency     AMA 43yo      contractions     Continue weekly BPP for AMA  Karen Nieves

## 2018-01-19 NOTE — LETTER
2018       RE: Kitty Ibarra  [address]  [facility] [address]     Dear Colleague,    Thank you for referring your patient, Kitty Ibarra, to the [facility] at [facility]. Please see a copy of my visit note below.    42 year old female, , presents at 33 2/7 weeks for obstetric ultrasound assessment indicated by AMA, h/o myomectomy.    Single fetus    Presentation cephalic    USEGA = 33 2/7 weeks.  EFW = 2387 grams, 71 % for 33 weeks.      ELMIRA = 19.1cm.  FHR = 133bpm     Placenta posterior and grade 0    Findings discussed with patient.    Weekly BPP for AMA.    ERROL Hector MD MPH

## 2018-01-24 ENCOUNTER — OFFICE VISIT (OUTPATIENT)
Dept: OBGYN | Facility: CLINIC | Age: 43
End: 2018-01-24
Attending: OBSTETRICS & GYNECOLOGY
Payer: COMMERCIAL

## 2018-01-24 VITALS
SYSTOLIC BLOOD PRESSURE: 111 MMHG | HEIGHT: 62 IN | HEART RATE: 108 BPM | BODY MASS INDEX: 24.27 KG/M2 | WEIGHT: 131.9 LBS | DIASTOLIC BLOOD PRESSURE: 79 MMHG

## 2018-01-24 DIAGNOSIS — O09.522 ELDERLY MULTIGRAVIDA IN SECOND TRIMESTER: Primary | ICD-10-CM

## 2018-01-24 DIAGNOSIS — Z98.890 H/O MYOMECTOMY: ICD-10-CM

## 2018-01-24 DIAGNOSIS — O09.91 HIGH-RISK PREGNANCY IN FIRST TRIMESTER: Primary | ICD-10-CM

## 2018-01-24 PROCEDURE — 76819 FETAL BIOPHYS PROFIL W/O NST: CPT | Mod: ZF

## 2018-01-24 NOTE — MR AVS SNAPSHOT
After Visit Summary   2018    Kitty Ibarra    MRN: [id number]           Patient Information     Date Of Birth          1975        Visit Information        Provider Department      2018 10:00 AM Karen Nieves MD [facility]        Today's Diagnoses     High-risk pregnancy in first trimester, , [facility] MD    -  1       Follow-ups after your visit        Your next 10 appointments already scheduled     2018  1:00 PM CST   ULTRASOUND with [facility] ULTRASOUND   [facility] ([facility])    [place] [facility] [address]  [address]  [facility] [address] [phone]            2018  1:30 PM CST   RETURN OB with Karen Nieves MD   [facility] ([facility])    [place] [facility] [address]  [address]  [facility] [address] [phone]            2018   Procedure with Karen Nieves MD   UR 4COB (--)    [address] [place] [address]  [facility] [address] [phone]              Who to contact     Please call your clinic at [phone] to:    Ask questions about your health    Make or cancel appointments    Discuss your medicines    Learn about your test results    Speak to your doctor   If you have compliments or concerns about an experience at your clinic, or if you wish to file a complaint, please contact [facility] Patient Relations at [phone] or email us at [email or web address]         Additional Information About Your Visit        MyChart Information     Tutellushart gives you secure access to your electronic health record. If you see a primary care provider, you can also send messages to your care team and make appointments. If you have questions, please call your primary care clinic.  If you do not have a primary care provider, please call [phone] and they will  "assist you.      FireBlade is an electronic gateway that provides easy, online access to your medical records. With FireBlade, you can request a clinic appointment, read your test results, renew a prescription or communicate with your care team.     To access your existing account, please contact your [facility] or call [phone] for assistance.        Care EveryWhere ID     This is your Care EveryWhere ID. This could be used by other organizations to access your [place] medical records  [id number]        Your Vitals Were     Pulse Height Last Period BMI (Body Mass Index)          108 1.575 m (5' 2.01\") 03/01/2017 24.12 kg/m2         Blood Pressure from Last 3 Encounters:   01/24/18 111/79   01/19/18 119/76   01/10/18 119/72    Weight from Last 3 Encounters:   01/24/18 59.8 kg (131 lb 14.4 oz)   01/10/18 59.9 kg (132 lb 1.6 oz)   12/29/17 58.1 kg (128 lb)              Today, you had the following     No orders found for display       Primary Care Provider Office Phone # Fax #    Noel Mayfield [phone]       [address] [place] [address]        Equal Access to Services     LEONIE WREN : Hadii jn bettencourt hadasho Soomaali, waaxda luqadaha, qaybta kaalmada adeegyada, delmar bell. So [facility] [phone].    ATENCIÓN: Si habla español, tiene a lopez disposición servicios gratuitos de asistencia lingüística. Llame al [phone].    We comply with applicable federal civil rights laws and [place] laws. We do not discriminate on the basis of race, color, national origin, age, disability, sex, sexual orientation, or gender identity.            Thank you!     Thank you for choosing [facility]  for your care. Our goal is always to provide you with excellent care. Hearing back from our patients is one way we can continue to improve our services. Please take a few minutes to complete the written survey that you may " receive in the mail after your visit with us. Thank you!             Your Updated Medication List - Protect others around you: Learn how to safely use, store and throw away your medicines at www.disposemymeds.org.          This list is accurate as of 1/24/18 11:41 AM.  Always use your most recent med list.                   Brand Name Dispense Instructions for use Diagnosis    prenatal multivitamin plus iron 27-0.8 MG Tabs per tablet      Take 1 tablet by mouth daily        Vitamin D (Cholecalciferol) 1000 UNITS Caps

## 2018-01-24 NOTE — LETTER
2018       RE: Kitty Ibarra  [address]  [facility] [address]     Dear Colleague,    Thank you for referring your patient, Kitty Ibarra, to the [facility] at [facility]. Please see a copy of my visit note below.    42 year old,  , presents at 34 0/7 weeks in pregnancy complicated by AMA, h/o myomectomy for biophysical assessment.    Fetal Breathing Movements (FBM): Normal - 2  Gross Body Movements (GBM): Normal - 2  Fetal Tone (FT): Normal - 2  AFV: Pocket of amniotic fluid > or = to 2 cm x 2 cm - 2  Quantitative Amniotic Fluid Volume Total: 19.6 cm      BPP 8/8.  FHR = 150bpm Normal.   MCA Not done.  NST Not done.     Single fetus in cephalic presentation.  Placenta posterior and grade 0.    Recommend weekly assessment.    ERROL Domínguez MD

## 2018-01-24 NOTE — LETTER
1/24/2018       RE: Kitty Ibarra  [address]  [facility] [address]     Dear Colleague,    Thank you for referring your patient, Kitty Ibarra, to the [facility] at [facility]. Please see a copy of my visit note below.    Doing well. No contractions. Reviewed c/s expectations and TAPS blocks.       Again, thank you for allowing me to participate in the care of your patient.      Sincerely,    Karen Nieves MD

## 2018-01-24 NOTE — PROGRESS NOTES
42 year old,  , presents at 34 0/7 weeks in pregnancy complicated by AMA, h/o myomectomy for biophysical assessment.    Fetal Breathing Movements (FBM): Normal - 2  Gross Body Movements (GBM): Normal - 2  Fetal Tone (FT): Normal - 2  AFV: Pocket of amniotic fluid > or = to 2 cm x 2 cm - 2  Quantitative Amniotic Fluid Volume Total: 19.6 cm      BPP 8/8.  FHR = 150bpm Normal.   MCA Not done.  NST Not done.     Single fetus in cephalic presentation.  Placenta posterior and grade 0.    Recommend weekly assessment.    ERROL Domínguez MD

## 2018-01-24 NOTE — MR AVS SNAPSHOT
After Visit Summary   1/24/2018    Kitty Ibarra    MRN: [id number]           Patient Information     Date Of Birth          1975        Visit Information        Provider Department      1/24/2018 9:30 AM [facility] ULTRASOUND [facility] [place] [facility]        Today's Diagnoses     AMA 43yo    -  1    H/O myomectomy           Follow-ups after your visit        Your next 10 appointments already scheduled     Feb 02, 2018  1:00 PM CST   ULTRASOUND with [facility] ULTRASOUND   [facility] [place] [facility] ([facility])    [place] [facility] [address]  [address]  [facility] [address] [phone]            Feb 02, 2018  1:30 PM CST   RETURN OB with Karen Nieves MD   [facility] [place] [facility] ([facility])    [place] [facility] [address]  [address]  [facility] [address] [phone]            Feb 09, 2018   Procedure with Karen Nieves MD   UR 4COB (--)    [address]  [facility] [address] [phone]              Who to contact     Please call your clinic at [phone] to:    Ask questions about your health    Make or cancel appointments    Discuss your medicines    Learn about your test results    Speak to your doctor   If you have compliments or concerns about an experience at your clinic, or if you wish to file a complaint, please contact [facility] Patient Relations at [phone] or email us at [email or web address]         Additional Information About Your Visit        Cmhart Information     Cmhart gives you secure access to your electronic health record. If you see a primary care provider, you can also send messages to your care team and make appointments. If you have questions, please call your primary care clinic.  If you do not have a primary care provider, please call [phone] and they will assist you.      Uri is an  electronic gateway that provides easy, online access to your medical records. With Welcu, you can request a clinic appointment, read your test results, renew a prescription or communicate with your care team.     To access your existing account, please contact your [facility] or call [phone] for assistance.        Care EveryWhere ID     This is your Care EveryWhere ID. This could be used by other organizations to access your [place] medical records  [id number]        Your Vitals Were     Last Period                   03/01/2017            Blood Pressure from Last 3 Encounters:   01/24/18 111/79   01/19/18 119/76   01/10/18 119/72    Weight from Last 3 Encounters:   01/24/18 59.8 kg (131 lb 14.4 oz)   01/10/18 59.9 kg (132 lb 1.6 oz)   12/29/17 58.1 kg (128 lb)               Primary Care Provider Office Phone # Fax #    Noel Mayfield [phone]       [address] [place] [address]        Equal Access to Services     LEONIE WREN : Hadii aad ku hadasho Soomaali, waaxda luqadaha, qaybta kaalmada adeegyada, delmar rogers haysanjuana sommers . So [facility] [phone].    ATENCIÓN: Si habla español, tiene a lopez disposición servicios gratuitos de asistencia lingüística. Ll[facility] [phone].    We comply with applicable federal civil rights laws and [place] laws. We do not discriminate on the basis of race, color, national origin, age, disability, sex, sexual orientation, or gender identity.            Thank you!     Thank you for choosing [facility]  for your care. Our goal is always to provide you with excellent care. Hearing back from our patients is one way we can continue to improve our services. Please take a few minutes to complete the written survey that you may receive in the mail after your visit with us. Thank you!             Your Updated Medication List - Protect others around you: Learn how to safely use,  store and throw away your medicines at www.disposemymeds.org.          This list is accurate as of 1/24/18 11:59 PM.  Always use your most recent med list.                   Brand Name Dispense Instructions for use Diagnosis    prenatal multivitamin plus iron 27-0.8 MG Tabs per tablet      Take 1 tablet by mouth daily        Vitamin D (Cholecalciferol) 1000 UNITS Caps

## 2018-02-02 ENCOUNTER — OFFICE VISIT (OUTPATIENT)
Dept: OBGYN | Facility: CLINIC | Age: 43
End: 2018-02-02
Attending: OBSTETRICS & GYNECOLOGY
Payer: COMMERCIAL

## 2018-02-02 VITALS
DIASTOLIC BLOOD PRESSURE: 81 MMHG | HEART RATE: 108 BPM | SYSTOLIC BLOOD PRESSURE: 115 MMHG | WEIGHT: 133.4 LBS | BODY MASS INDEX: 24.55 KG/M2 | HEIGHT: 62 IN

## 2018-02-02 DIAGNOSIS — O09.522 ELDERLY MULTIGRAVIDA IN SECOND TRIMESTER: Primary | ICD-10-CM

## 2018-02-02 DIAGNOSIS — O09.91 HIGH-RISK PREGNANCY IN FIRST TRIMESTER: Primary | ICD-10-CM

## 2018-02-02 DIAGNOSIS — Z98.890 H/O MYOMECTOMY: ICD-10-CM

## 2018-02-02 PROCEDURE — 76819 FETAL BIOPHYS PROFIL W/O NST: CPT | Mod: ZF

## 2018-02-02 PROCEDURE — G0463 HOSPITAL OUTPT CLINIC VISIT: HCPCS | Mod: 25

## 2018-02-02 NOTE — LETTER
2018       RE: Kitty Ibarra  [address]  [facility] [address]     Dear Colleague,    Thank you for referring your patient, Kitty Ibarra, to the [facility] at [facility]. Please see a copy of my visit note below.    preg complicated by:  Patient Active Problem List   Diagnosis     Vitamin D deficiency     High-risk pregnancy in first trimester, , [facility] MD     Previous  times 2     Intramural leiomyoma of uterus     H/O myomectomy     Vitamin D deficiency     AMA 41yo      contractions     U/S today reveals transverse lie with arm and cord above cervix. Reviewed precautions if SROM or labor.   Otherwise repeat  scheduled for 18  Karen Nieves

## 2018-02-02 NOTE — PROGRESS NOTES
preg complicated by:  Patient Active Problem List   Diagnosis     Vitamin D deficiency     High-risk pregnancy in first trimester, , [facility] MD     Previous  times 2     Intramural leiomyoma of uterus     H/O myomectomy     Vitamin D deficiency     AMA 41yo      contractions     U/S today reveals transverse lie with arm and cord above cervix. Reviewed precautions if SROM or labor.   Otherwise repeat  scheduled for 18  Karen Nieves

## 2018-02-02 NOTE — MR AVS SNAPSHOT
After Visit Summary   2/2/2018    Kitty Ibarra    MRN: [id number]           Patient Information     Date Of Birth          1975        Visit Information        Provider Department      2/2/2018 1:00 PM [facility] ULTRASOUND [facility]        Today's Diagnoses     AMA 41yo    -  1    H/O myomectomy           Follow-ups after your visit        Your next 10 appointments already scheduled     Feb 09, 2018   Procedure with Karen Nieves MD   [facility] (--)    [address]  [facility] [address] [phone]              Who to contact     Please call your clinic at [phone] to:    Ask questions about your health    Make or cancel appointments    Discuss your medicines    Learn about your test results    Speak to your doctor   If you have compliments or concerns about an experience at your clinic, or if you wish to file a complaint, please contact [facility] Patient Relations at [phone] or email us at [email or web address]         Additional Information About Your Visit        MyChart Information     Gradeable gives you secure access to your electronic health record. If you see a primary care provider, you can also send messages to your care team and make appointments. If you have questions, please call your primary care clinic.  If you do not have a primary care provider, please call [phone] and they will assist you.      Gradeable is an electronic gateway that provides easy, online access to your medical records. With Gradeable, you can request a clinic appointment, read your test results, renew a prescription or communicate with your care team.     To access your existing account, please contact your [facility] or call [phone] for assistance.        Care EveryWhere ID     This is your Care EveryWhere ID. This could be used by other organizations to access your [facility]  records  [id number]        Your Vitals Were     Last Period                   03/01/2017            Blood Pressure from Last 3 Encounters:   02/02/18 115/81   01/24/18 111/79   01/19/18 119/76    Weight from Last 3 Encounters:   02/02/18 60.5 kg (133 lb 6.4 oz)   01/24/18 59.8 kg (131 lb 14.4 oz)   01/10/18 59.9 kg (132 lb 1.6 oz)               Primary Care Provider Office Phone # Fax #    Noel Mayfield [phone] [address] [place] [address]        Equal Access to Services     [facility] SIENA : Hadii jn Larry, warosa buck, sharif iniguezmabeatrice rock, delmar sommers . So [facility] [phone].    ATENCIÓN: Si habla español, tiene a lopez disposición servicios gratuitos de asistencia lingüística. [facility] [phone].    We comply with applicable federal civil rights laws and [place] laws. We do not discriminate on the basis of race, color, national origin, age, disability, sex, sexual orientation, or gender identity.            Thank you!     Thank you for choosing [facility]  for your care. Our goal is always to provide you with excellent care. Hearing back from our patients is one way we can continue to improve our services. Please take a few minutes to complete the written survey that you may receive in the mail after your visit with us. Thank you!             Your Updated Medication List - Protect others around you: Learn how to safely use, store and throw away your medicines at www.disposemymeds.org.          This list is accurate as of 2/2/18  1:47 PM.  Always use your most recent med list.                   Brand Name Dispense Instructions for use Diagnosis    prenatal multivitamin plus iron 27-0.8 MG Tabs per tablet      Take 1 tablet by mouth daily        Vitamin D (Cholecalciferol) 1000 UNITS Caps

## 2018-02-02 NOTE — LETTER
2018       RE: Kitty Ibarra  [address]  [facility] [address]     Dear Colleague,    Thank you for referring your patient, Kitty Ibarra, to the [facility] at [facility]. Please see a copy of my visit note below.    42 year old,  , presents at 35 2/7 weeks in pregnancy complicated by AMA for biophysical assessment.    Fetal Breathing Movements (FBM): Normal - 2  Gross Body Movements (GBM): Normal - 2  Fetal Tone (FT): Normal - 2  AFV: Pocket of amniotic fluid > or = to 2 cm x 2 cm - 2  Quantitative Amniotic Fluid Volume Total: 18.4 cm      BPP 8/8.  FHR = 134bpm Normal.   MCA Not done.  NST Not done.     Single fetus in transverse presentation. Umbilical cord and arm presenting. Placenta posterior and grade 1.      C/s in one week.    ERROL Domínguez

## 2018-02-02 NOTE — MR AVS SNAPSHOT
After Visit Summary   2018    Kitty Ibarra    MRN: [id number]           Patient Information     Date Of Birth          1975        Visit Information        Provider Department      2018 1:30 PM Karen Nieves MD [facility]        Today's Diagnoses     High-risk pregnancy in first trimester, , S MD    -  1       Follow-ups after your visit        Your next 10 appointments already scheduled     2018   Procedure with Karen Nieves MD   [facility] (--)    [address]  [facility] [address] [phone]              Who to contact     Please call your clinic at [phone] to:    Ask questions about your health    Make or cancel appointments    Discuss your medicines    Learn about your test results    Speak to your doctor   If you have compliments or concerns about an experience at your clinic, or if you wish to file a complaint, please contact [facility] Patient Relations at [phone] or email us at [email or web address]         Additional Information About Your Visit        MyChart Information     Olapict gives you secure access to your electronic health record. If you see a primary care provider, you can also send messages to your care team and make appointments. If you have questions, please call your primary care clinic.  If you do not have a primary care provider, please call [phone] and they will assist you.      YouGoDo is an electronic gateway that provides easy, online access to your medical records. With YouGoDo, you can request a clinic appointment, read your test results, renew a prescription or communicate with your care team.     To access your existing account, please contact your [facility] or call [phone] for assistance.        Care EveryWhere ID     This is your Care EveryWhere ID. This could be used by other organizations to  "access your [place] medical records  [id number]        Your Vitals Were     Pulse Height Last Period BMI (Body Mass Index)          108 1.575 m (5' 2.01\") 03/01/2017 24.39 kg/m2         Blood Pressure from Last 3 Encounters:   02/02/18 115/81   01/24/18 111/79   01/19/18 119/76    Weight from Last 3 Encounters:   02/02/18 60.5 kg (133 lb 6.4 oz)   01/24/18 59.8 kg (131 lb 14.4 oz)   01/10/18 59.9 kg (132 lb 1.6 oz)              Today, you had the following     No orders found for display       Primary Care Provider Office Phone # Fax #    Noel Mayfield [phone]       [address] [place] [address]        Equal Access to Services     [facility] : Hadii jn menon Sosallie, waaxda luqadaha, qaybta kaalmada pranav, delmar sommers . So [facility] [phone].    ATENCIÓN: Si habla español, tiene a lopez disposición servicios gratuitos de asistencia lingüística. Matty al [phone].    We comply with applicable federal civil rights laws and [place] laws. We do not discriminate on the basis of race, color, national origin, age, disability, sex, sexual orientation, or gender identity.            Thank you!     Thank you for choosing [facility]  for your care. Our goal is always to provide you with excellent care. Hearing back from our patients is one way we can continue to improve our services. Please take a few minutes to complete the written survey that you may receive in the mail after your visit with us. Thank you!             Your Updated Medication List - Protect others around you: Learn how to safely use, store and throw away your medicines at www.disposemymeds.org.          This list is accurate as of 2/2/18  1:46 PM.  Always use your most recent med list.                   Brand Name Dispense Instructions for use Diagnosis    prenatal multivitamin plus iron 27-0.8 MG Tabs per tablet      Take 1 tablet by mouth daily        " Vitamin D (Cholecalciferol) 1000 UNITS Caps

## 2018-02-08 ENCOUNTER — TELEPHONE (OUTPATIENT)
Dept: OBGYN | Facility: CLINIC | Age: 43
End: 2018-02-08

## 2018-02-08 ENCOUNTER — ANESTHESIA EVENT (OUTPATIENT)
Dept: OBGYN | Facility: CLINIC | Age: 43
End: 2018-02-08
Payer: COMMERCIAL

## 2018-02-08 DIAGNOSIS — Z34.93 ENCOUNTER FOR PREGNANCY RELATED EXAMINATION IN THIRD TRIMESTER: Primary | ICD-10-CM

## 2018-02-08 DIAGNOSIS — Z34.93 ENCOUNTER FOR PREGNANCY RELATED EXAMINATION IN THIRD TRIMESTER: ICD-10-CM

## 2018-02-08 LAB
ABO + RH BLD: NORMAL
ABO + RH BLD: NORMAL
BLD GP AB SCN SERPL QL: NORMAL
BLOOD BANK CMNT PATIENT-IMP: NORMAL
ERYTHROCYTE [DISTWIDTH] IN BLOOD BY AUTOMATED COUNT: 13.3 % (ref 10–15)
HCT VFR BLD AUTO: 38.9 % (ref 35–47)
HGB BLD-MCNC: 12.4 G/DL (ref 11.7–15.7)
MCH RBC QN AUTO: 29.6 PG (ref 26.5–33)
MCHC RBC AUTO-ENTMCNC: 31.9 G/DL (ref 31.5–36.5)
MCV RBC AUTO: 93 FL (ref 78–100)
PLATELET # BLD AUTO: 162 10E9/L (ref 150–450)
RBC # BLD AUTO: 4.19 10E12/L (ref 3.8–5.2)
SPECIMEN EXP DATE BLD: NORMAL
WBC # BLD AUTO: 8.6 10E9/L (ref 4–11)

## 2018-02-08 PROCEDURE — 86850 RBC ANTIBODY SCREEN: CPT | Performed by: OBSTETRICS & GYNECOLOGY

## 2018-02-08 PROCEDURE — 86901 BLOOD TYPING SEROLOGIC RH(D): CPT | Performed by: OBSTETRICS & GYNECOLOGY

## 2018-02-08 PROCEDURE — 86900 BLOOD TYPING SEROLOGIC ABO: CPT | Performed by: OBSTETRICS & GYNECOLOGY

## 2018-02-08 PROCEDURE — 85027 COMPLETE CBC AUTOMATED: CPT | Performed by: OBSTETRICS & GYNECOLOGY

## 2018-02-08 PROCEDURE — 36415 COLL VENOUS BLD VENIPUNCTURE: CPT | Performed by: OBSTETRICS & GYNECOLOGY

## 2018-02-08 NOTE — ANESTHESIA PREPROCEDURE EVALUATION
Anesthesia Evaluation     . Pt has had prior anesthetic.            ROS/MED HX    ENT/Pulmonary:  - neg pulmonary ROS     Neurologic:  - neg neurologic ROS     Cardiovascular:  - neg cardiovascular ROS       METS/Exercise Tolerance:     Hematologic:  - neg hematologic  ROS       Musculoskeletal:  - neg musculoskeletal ROS       GI/Hepatic:  - neg GI/hepatic ROS       Renal/Genitourinary:  - ROS Renal section negative       Endo:  - neg endo ROS       Psychiatric:  - neg psychiatric ROS       Infectious Disease:         Malignancy:         Other:    (+) Possibly pregnant                  Procedure: Procedure(s):  Repeat  Section  - Wound Class:     HPI: Kitty Ibarra is a 42 year old female with the following history who presents for repeat c section.     PMHx/PSHx:  Past Medical History:   Diagnosis Date     NO ACTIVE PROBLEMS        Past Surgical History:   Procedure Laterality Date     BIOPSY BREAST  1991    benign      SECTION  ,      MYOMECTOMY UTERUS  2006    Uterine fibroids         No current facility-administered medications on file prior to encounter.   Current Outpatient Prescriptions on File Prior to Encounter:  Prenatal Vit-Fe Fumarate-FA (PRENATAL MULTIVITAMIN  PLUS IRON) 27-0.8 MG TABS per tablet Take 1 tablet by mouth daily       Social Hx:   Social History   Substance Use Topics     Smoking status: Never Smoker     Smokeless tobacco: Never Used     Alcohol use No       Allergies:   Allergies   Allergen Reactions     Nkda [No Known Drug Allergies]          NPO Status: Per ASA Guidelines    Labs:    Blood Bank:  Lab Results   Component Value Date    ABO O 2017    RH Pos 2017    AS Neg 2017     BMP:  Recent Labs   Lab Test  17   1755  17   1019   NA   --   141   POTASSIUM   --   3.7   CHLORIDE   --   108   CO2   --   23   BUN   --   10   CR  0.45*  0.45*   GLC   --   96   ODALYS   --   8.1*     CBC:   Recent Labs   Lab Test  17    1124   WBC  11.3*   RBC  4.04   HGB  12.2   HCT  37.4   MCV  93   MCH  30.2   MCHC  32.6   RDW  13.0   PLT  214     Coags:  No results for input(s): INR, PTT, FIBR in the last 43999 hours.                           Anesthesia Plan      History & Physical Review      ASA Status:  1 .        Plan for Spinal with Other induction. Maintenance will be Other.    PONV prophylaxis:  Ondansetron (or other 5HT-3)       Postoperative Care  Postoperative pain management:  IV analgesics and Peripheral nerve block (Single Shot).      Consents

## 2018-02-09 ENCOUNTER — ANESTHESIA (OUTPATIENT)
Dept: OBGYN | Facility: CLINIC | Age: 43
End: 2018-02-09
Payer: COMMERCIAL

## 2018-02-09 ENCOUNTER — HOSPITAL ENCOUNTER (INPATIENT)
Facility: CLINIC | Age: 43
LOS: 3 days | Discharge: HOME HEALTH | End: 2018-02-12
Attending: OBSTETRICS & GYNECOLOGY | Admitting: OBSTETRICS & GYNECOLOGY
Payer: COMMERCIAL

## 2018-02-09 ENCOUNTER — SURGERY (OUTPATIENT)
Age: 43
End: 2018-02-09

## 2018-02-09 DIAGNOSIS — Z98.891 S/P C-SECTION: Primary | ICD-10-CM

## 2018-02-09 PROCEDURE — 25000132 ZZH RX MED GY IP 250 OP 250 PS 637: Performed by: STUDENT IN AN ORGANIZED HEALTH CARE EDUCATION/TRAINING PROGRAM

## 2018-02-09 PROCEDURE — 25000125 ZZHC RX 250: Performed by: STUDENT IN AN ORGANIZED HEALTH CARE EDUCATION/TRAINING PROGRAM

## 2018-02-09 PROCEDURE — 36415 COLL VENOUS BLD VENIPUNCTURE: CPT | Performed by: STUDENT IN AN ORGANIZED HEALTH CARE EDUCATION/TRAINING PROGRAM

## 2018-02-09 PROCEDURE — 36000057 ZZH SURGERY LEVEL 3 1ST 30 MIN - UMMC: Performed by: OBSTETRICS & GYNECOLOGY

## 2018-02-09 PROCEDURE — 12000030 ZZH R&B OB INTERMEDIATE UMMC

## 2018-02-09 PROCEDURE — 25000128 H RX IP 250 OP 636: Performed by: STUDENT IN AN ORGANIZED HEALTH CARE EDUCATION/TRAINING PROGRAM

## 2018-02-09 PROCEDURE — 40000275 ZZH STATISTIC RCP TIME EA 10 MIN

## 2018-02-09 PROCEDURE — 25000132 ZZH RX MED GY IP 250 OP 250 PS 637: Performed by: OBSTETRICS & GYNECOLOGY

## 2018-02-09 PROCEDURE — 27210794 ZZH OR GENERAL SUPPLY STERILE: Performed by: OBSTETRICS & GYNECOLOGY

## 2018-02-09 PROCEDURE — 37000009 ZZH ANESTHESIA TECHNICAL FEE, EACH ADDTL 15 MIN: Performed by: OBSTETRICS & GYNECOLOGY

## 2018-02-09 PROCEDURE — 27110028 ZZH OR GENERAL SUPPLY NON-STERILE: Performed by: OBSTETRICS & GYNECOLOGY

## 2018-02-09 PROCEDURE — 25000128 H RX IP 250 OP 636

## 2018-02-09 PROCEDURE — 86780 TREPONEMA PALLIDUM: CPT | Performed by: STUDENT IN AN ORGANIZED HEALTH CARE EDUCATION/TRAINING PROGRAM

## 2018-02-09 PROCEDURE — 71000014 ZZH RECOVERY PHASE 1 LEVEL 2 FIRST HR: Performed by: OBSTETRICS & GYNECOLOGY

## 2018-02-09 PROCEDURE — 40000977 ZZH STATISTIC ATTENDANCE AT DELIVERY

## 2018-02-09 PROCEDURE — 36000059 ZZH SURGERY LEVEL 3 EA 15 ADDTL MIN UMMC: Performed by: OBSTETRICS & GYNECOLOGY

## 2018-02-09 PROCEDURE — 25000128 H RX IP 250 OP 636: Performed by: OBSTETRICS & GYNECOLOGY

## 2018-02-09 PROCEDURE — 37000008 ZZH ANESTHESIA TECHNICAL FEE, 1ST 30 MIN: Performed by: OBSTETRICS & GYNECOLOGY

## 2018-02-09 PROCEDURE — C9290 INJ, BUPIVACAINE LIPOSOME: HCPCS | Performed by: STUDENT IN AN ORGANIZED HEALTH CARE EDUCATION/TRAINING PROGRAM

## 2018-02-09 PROCEDURE — 71000015 ZZH RECOVERY PHASE 1 LEVEL 2 EA ADDTL HR: Performed by: OBSTETRICS & GYNECOLOGY

## 2018-02-09 PROCEDURE — 40000170 ZZH STATISTIC PRE-PROCEDURE ASSESSMENT II: Performed by: OBSTETRICS & GYNECOLOGY

## 2018-02-09 RX ORDER — OXYCODONE HYDROCHLORIDE 5 MG/1
5-10 TABLET ORAL
Status: DISCONTINUED | OUTPATIENT
Start: 2018-02-09 | End: 2018-02-12 | Stop reason: HOSPADM

## 2018-02-09 RX ORDER — CEFAZOLIN SODIUM 2 G/100ML
2 INJECTION, SOLUTION INTRAVENOUS
Status: COMPLETED | OUTPATIENT
Start: 2018-02-09 | End: 2018-02-09

## 2018-02-09 RX ORDER — ONDANSETRON 2 MG/ML
INJECTION INTRAMUSCULAR; INTRAVENOUS PRN
Status: DISCONTINUED | OUTPATIENT
Start: 2018-02-09 | End: 2018-02-09

## 2018-02-09 RX ORDER — CARBOPROST TROMETHAMINE 250 UG/ML
250 INJECTION, SOLUTION INTRAMUSCULAR
Status: DISCONTINUED | OUTPATIENT
Start: 2018-02-09 | End: 2018-02-12 | Stop reason: HOSPADM

## 2018-02-09 RX ORDER — MISOPROSTOL 200 UG/1
400 TABLET ORAL
Status: DISCONTINUED | OUTPATIENT
Start: 2018-02-09 | End: 2018-02-12 | Stop reason: HOSPADM

## 2018-02-09 RX ORDER — IBUPROFEN 800 MG/1
800 TABLET, FILM COATED ORAL EVERY 6 HOURS PRN
Status: DISCONTINUED | OUTPATIENT
Start: 2018-02-09 | End: 2018-02-12 | Stop reason: HOSPADM

## 2018-02-09 RX ORDER — KETOROLAC TROMETHAMINE 30 MG/ML
INJECTION, SOLUTION INTRAMUSCULAR; INTRAVENOUS PRN
Status: DISCONTINUED | OUTPATIENT
Start: 2018-02-09 | End: 2018-02-09

## 2018-02-09 RX ORDER — CEFAZOLIN SODIUM 1 G
1 VIAL (EA) INJECTION SEE ADMIN INSTRUCTIONS
Status: DISCONTINUED | OUTPATIENT
Start: 2018-02-09 | End: 2018-02-09

## 2018-02-09 RX ORDER — CEFAZOLIN SODIUM 2 G/100ML
2 INJECTION, SOLUTION INTRAVENOUS
Status: DISCONTINUED | OUTPATIENT
Start: 2018-02-09 | End: 2018-02-09

## 2018-02-09 RX ORDER — OXYTOCIN/0.9 % SODIUM CHLORIDE 30/500 ML
PLASTIC BAG, INJECTION (ML) INTRAVENOUS CONTINUOUS PRN
Status: DISCONTINUED | OUTPATIENT
Start: 2018-02-09 | End: 2018-02-09

## 2018-02-09 RX ORDER — LIDOCAINE 40 MG/G
CREAM TOPICAL
Status: DISCONTINUED | OUTPATIENT
Start: 2018-02-09 | End: 2018-02-12 | Stop reason: HOSPADM

## 2018-02-09 RX ORDER — IBUPROFEN 400 MG/1
400 TABLET, FILM COATED ORAL EVERY 6 HOURS PRN
Status: DISCONTINUED | OUTPATIENT
Start: 2018-02-09 | End: 2018-02-12 | Stop reason: HOSPADM

## 2018-02-09 RX ORDER — SODIUM CHLORIDE, SODIUM LACTATE, POTASSIUM CHLORIDE, CALCIUM CHLORIDE 600; 310; 30; 20 MG/100ML; MG/100ML; MG/100ML; MG/100ML
INJECTION, SOLUTION INTRAVENOUS CONTINUOUS
Status: DISCONTINUED | OUTPATIENT
Start: 2018-02-09 | End: 2018-02-12 | Stop reason: HOSPADM

## 2018-02-09 RX ORDER — MORPHINE SULFATE 1 MG/ML
INJECTION, SOLUTION EPIDURAL; INTRATHECAL; INTRAVENOUS
Status: DISCONTINUED
Start: 2018-02-09 | End: 2018-02-09 | Stop reason: HOSPADM

## 2018-02-09 RX ORDER — MORPHINE SULFATE 1 MG/ML
INJECTION, SOLUTION EPIDURAL; INTRATHECAL; INTRAVENOUS PRN
Status: DISCONTINUED | OUTPATIENT
Start: 2018-02-09 | End: 2018-02-09

## 2018-02-09 RX ORDER — NALOXONE HYDROCHLORIDE 0.4 MG/ML
.1-.4 INJECTION, SOLUTION INTRAMUSCULAR; INTRAVENOUS; SUBCUTANEOUS
Status: ACTIVE | OUTPATIENT
Start: 2018-02-09 | End: 2018-02-10

## 2018-02-09 RX ORDER — AMOXICILLIN 250 MG
2 CAPSULE ORAL 2 TIMES DAILY PRN
Status: DISCONTINUED | OUTPATIENT
Start: 2018-02-09 | End: 2018-02-12 | Stop reason: HOSPADM

## 2018-02-09 RX ORDER — SODIUM CHLORIDE, SODIUM LACTATE, POTASSIUM CHLORIDE, CALCIUM CHLORIDE 600; 310; 30; 20 MG/100ML; MG/100ML; MG/100ML; MG/100ML
INJECTION, SOLUTION INTRAVENOUS CONTINUOUS
Status: DISCONTINUED | OUTPATIENT
Start: 2018-02-09 | End: 2018-02-09 | Stop reason: HOSPADM

## 2018-02-09 RX ORDER — NALOXONE HYDROCHLORIDE 0.4 MG/ML
.1-.4 INJECTION, SOLUTION INTRAMUSCULAR; INTRAVENOUS; SUBCUTANEOUS
Status: DISCONTINUED | OUTPATIENT
Start: 2018-02-09 | End: 2018-02-12 | Stop reason: HOSPADM

## 2018-02-09 RX ORDER — HYDROCORTISONE 2.5 %
CREAM (GRAM) TOPICAL 3 TIMES DAILY PRN
Status: DISCONTINUED | OUTPATIENT
Start: 2018-02-09 | End: 2018-02-12 | Stop reason: HOSPADM

## 2018-02-09 RX ORDER — CITRIC ACID/SODIUM CITRATE 334-500MG
30 SOLUTION, ORAL ORAL
Status: COMPLETED | OUTPATIENT
Start: 2018-02-09 | End: 2018-02-09

## 2018-02-09 RX ORDER — LIDOCAINE 40 MG/G
CREAM TOPICAL
Status: DISCONTINUED | OUTPATIENT
Start: 2018-02-09 | End: 2018-02-09 | Stop reason: HOSPADM

## 2018-02-09 RX ORDER — ACETAMINOPHEN 325 MG/1
975 TABLET ORAL EVERY 8 HOURS
Status: COMPLETED | OUTPATIENT
Start: 2018-02-09 | End: 2018-02-12

## 2018-02-09 RX ORDER — KETOROLAC TROMETHAMINE 30 MG/ML
30 INJECTION, SOLUTION INTRAMUSCULAR; INTRAVENOUS EVERY 6 HOURS
Status: DISPENSED | OUTPATIENT
Start: 2018-02-09 | End: 2018-02-10

## 2018-02-09 RX ORDER — ONDANSETRON 2 MG/ML
4 INJECTION INTRAMUSCULAR; INTRAVENOUS EVERY 30 MIN PRN
Status: DISCONTINUED | OUTPATIENT
Start: 2018-02-09 | End: 2018-02-12 | Stop reason: HOSPADM

## 2018-02-09 RX ORDER — ONDANSETRON 2 MG/ML
4 INJECTION INTRAMUSCULAR; INTRAVENOUS EVERY 6 HOURS PRN
Status: DISCONTINUED | OUTPATIENT
Start: 2018-02-09 | End: 2018-02-12 | Stop reason: HOSPADM

## 2018-02-09 RX ORDER — BUPIVACAINE HYDROCHLORIDE AND EPINEPHRINE 2.5; 5 MG/ML; UG/ML
INJECTION, SOLUTION INFILTRATION; PERINEURAL PRN
Status: DISCONTINUED | OUTPATIENT
Start: 2018-02-09 | End: 2018-02-09

## 2018-02-09 RX ORDER — OXYTOCIN/0.9 % SODIUM CHLORIDE 30/500 ML
340 PLASTIC BAG, INJECTION (ML) INTRAVENOUS CONTINUOUS PRN
Status: DISCONTINUED | OUTPATIENT
Start: 2018-02-09 | End: 2018-02-12 | Stop reason: HOSPADM

## 2018-02-09 RX ORDER — FENTANYL CITRATE 50 UG/ML
25-50 INJECTION, SOLUTION INTRAMUSCULAR; INTRAVENOUS
Status: DISCONTINUED | OUTPATIENT
Start: 2018-02-09 | End: 2018-02-12 | Stop reason: HOSPADM

## 2018-02-09 RX ORDER — ONDANSETRON 4 MG/1
4 TABLET, ORALLY DISINTEGRATING ORAL EVERY 30 MIN PRN
Status: DISCONTINUED | OUTPATIENT
Start: 2018-02-09 | End: 2018-02-12 | Stop reason: HOSPADM

## 2018-02-09 RX ORDER — DIPHENHYDRAMINE HYDROCHLORIDE 50 MG/ML
25 INJECTION INTRAMUSCULAR; INTRAVENOUS EVERY 6 HOURS PRN
Status: DISCONTINUED | OUTPATIENT
Start: 2018-02-09 | End: 2018-02-12 | Stop reason: HOSPADM

## 2018-02-09 RX ORDER — EPHEDRINE SULFATE 50 MG/ML
INJECTION, SOLUTION INTRAMUSCULAR; INTRAVENOUS; SUBCUTANEOUS PRN
Status: DISCONTINUED | OUTPATIENT
Start: 2018-02-09 | End: 2018-02-09

## 2018-02-09 RX ORDER — LANOLIN 100 %
OINTMENT (GRAM) TOPICAL
Status: DISCONTINUED | OUTPATIENT
Start: 2018-02-09 | End: 2018-02-12 | Stop reason: HOSPADM

## 2018-02-09 RX ORDER — NALBUPHINE HYDROCHLORIDE 10 MG/ML
2.5-5 INJECTION, SOLUTION INTRAMUSCULAR; INTRAVENOUS; SUBCUTANEOUS EVERY 6 HOURS PRN
Status: DISCONTINUED | OUTPATIENT
Start: 2018-02-09 | End: 2018-02-12 | Stop reason: HOSPADM

## 2018-02-09 RX ORDER — METHYLERGONOVINE MALEATE 0.2 MG/ML
INJECTION INTRAVENOUS PRN
Status: DISCONTINUED | OUTPATIENT
Start: 2018-02-09 | End: 2018-02-09

## 2018-02-09 RX ORDER — OXYTOCIN/0.9 % SODIUM CHLORIDE 30/500 ML
100 PLASTIC BAG, INJECTION (ML) INTRAVENOUS CONTINUOUS
Status: DISCONTINUED | OUTPATIENT
Start: 2018-02-09 | End: 2018-02-12 | Stop reason: HOSPADM

## 2018-02-09 RX ORDER — SODIUM CHLORIDE, SODIUM LACTATE, POTASSIUM CHLORIDE, CALCIUM CHLORIDE 600; 310; 30; 20 MG/100ML; MG/100ML; MG/100ML; MG/100ML
INJECTION, SOLUTION INTRAVENOUS
Status: COMPLETED
Start: 2018-02-09 | End: 2018-02-09

## 2018-02-09 RX ORDER — OXYTOCIN 10 [USP'U]/ML
10 INJECTION, SOLUTION INTRAMUSCULAR; INTRAVENOUS
Status: DISCONTINUED | OUTPATIENT
Start: 2018-02-09 | End: 2018-02-12 | Stop reason: HOSPADM

## 2018-02-09 RX ORDER — BISACODYL 10 MG
10 SUPPOSITORY, RECTAL RECTAL DAILY PRN
Status: DISCONTINUED | OUTPATIENT
Start: 2018-02-11 | End: 2018-02-12 | Stop reason: HOSPADM

## 2018-02-09 RX ORDER — SIMETHICONE 80 MG
80 TABLET,CHEWABLE ORAL 4 TIMES DAILY PRN
Status: DISCONTINUED | OUTPATIENT
Start: 2018-02-09 | End: 2018-02-12 | Stop reason: HOSPADM

## 2018-02-09 RX ORDER — SODIUM CHLORIDE, SODIUM LACTATE, POTASSIUM CHLORIDE, CALCIUM CHLORIDE 600; 310; 30; 20 MG/100ML; MG/100ML; MG/100ML; MG/100ML
INJECTION, SOLUTION INTRAVENOUS CONTINUOUS PRN
Status: DISCONTINUED | OUTPATIENT
Start: 2018-02-09 | End: 2018-02-09

## 2018-02-09 RX ORDER — METHYLERGONOVINE MALEATE 0.2 MG/ML
200 INJECTION INTRAVENOUS
Status: DISCONTINUED | OUTPATIENT
Start: 2018-02-09 | End: 2018-02-12 | Stop reason: HOSPADM

## 2018-02-09 RX ORDER — EPHEDRINE SULFATE 50 MG/ML
5 INJECTION, SOLUTION INTRAMUSCULAR; INTRAVENOUS; SUBCUTANEOUS
Status: DISCONTINUED | OUTPATIENT
Start: 2018-02-09 | End: 2018-02-12 | Stop reason: HOSPADM

## 2018-02-09 RX ORDER — AMOXICILLIN 250 MG
1 CAPSULE ORAL 2 TIMES DAILY PRN
Status: DISCONTINUED | OUTPATIENT
Start: 2018-02-09 | End: 2018-02-12 | Stop reason: HOSPADM

## 2018-02-09 RX ORDER — DIPHENHYDRAMINE HCL 25 MG
25 CAPSULE ORAL EVERY 6 HOURS PRN
Status: DISCONTINUED | OUTPATIENT
Start: 2018-02-09 | End: 2018-02-12 | Stop reason: HOSPADM

## 2018-02-09 RX ORDER — BUPIVACAINE HYDROCHLORIDE 7.5 MG/ML
INJECTION, SOLUTION INTRASPINAL PRN
Status: DISCONTINUED | OUTPATIENT
Start: 2018-02-09 | End: 2018-02-09

## 2018-02-09 RX ORDER — ACETAMINOPHEN 325 MG/1
650 TABLET ORAL EVERY 4 HOURS PRN
Status: DISCONTINUED | OUTPATIENT
Start: 2018-02-12 | End: 2018-02-12 | Stop reason: HOSPADM

## 2018-02-09 RX ADMIN — PHENYLEPHRINE HYDROCHLORIDE 0.4 MCG/KG/MIN: 10 INJECTION, SOLUTION INTRAMUSCULAR; INTRAVENOUS; SUBCUTANEOUS at 11:23

## 2018-02-09 RX ADMIN — PHENYLEPHRINE HYDROCHLORIDE 100 MCG: 10 INJECTION, SOLUTION INTRAMUSCULAR; INTRAVENOUS; SUBCUTANEOUS at 11:32

## 2018-02-09 RX ADMIN — Medication 7.5 MG: at 11:42

## 2018-02-09 RX ADMIN — BUPIVACAINE HYDROCHLORIDE IN DEXTROSE 0.2 ML: 7.5 INJECTION, SOLUTION SUBARACHNOID at 11:27

## 2018-02-09 RX ADMIN — KETOROLAC TROMETHAMINE 30 MG: 30 INJECTION, SOLUTION INTRAMUSCULAR at 12:20

## 2018-02-09 RX ADMIN — MORPHINE SULFATE 0.2 MG: 1 INJECTION EPIDURAL; INTRATHECAL; INTRAVENOUS at 11:27

## 2018-02-09 RX ADMIN — ACETAMINOPHEN 975 MG: 325 TABLET, FILM COATED ORAL at 17:58

## 2018-02-09 RX ADMIN — SODIUM CHLORIDE, POTASSIUM CHLORIDE, SODIUM LACTATE AND CALCIUM CHLORIDE: 600; 310; 30; 20 INJECTION, SOLUTION INTRAVENOUS at 11:30

## 2018-02-09 RX ADMIN — PHENYLEPHRINE HYDROCHLORIDE 100 MCG: 10 INJECTION, SOLUTION INTRAMUSCULAR; INTRAVENOUS; SUBCUTANEOUS at 11:29

## 2018-02-09 RX ADMIN — KETOROLAC TROMETHAMINE 30 MG: 30 INJECTION, SOLUTION INTRAMUSCULAR at 18:47

## 2018-02-09 RX ADMIN — SODIUM CITRATE AND CITRIC ACID MONOHYDRATE 30 ML: 500; 334 SOLUTION ORAL at 11:08

## 2018-02-09 RX ADMIN — SODIUM CHLORIDE, POTASSIUM CHLORIDE, SODIUM LACTATE AND CALCIUM CHLORIDE 1000 ML: 600; 310; 30; 20 INJECTION, SOLUTION INTRAVENOUS at 09:30

## 2018-02-09 RX ADMIN — PHENYLEPHRINE HYDROCHLORIDE 0.6 MCG/KG/MIN: 10 INJECTION, SOLUTION INTRAMUSCULAR; INTRAVENOUS; SUBCUTANEOUS at 11:28

## 2018-02-09 RX ADMIN — OXYTOCIN-SODIUM CHLORIDE 0.9% IV SOLN 30 UNIT/500ML 100 ML/HR: 30-0.9/5 SOLUTION at 16:13

## 2018-02-09 RX ADMIN — SODIUM CHLORIDE, POTASSIUM CHLORIDE, SODIUM LACTATE AND CALCIUM CHLORIDE: 600; 310; 30; 20 INJECTION, SOLUTION INTRAVENOUS at 11:16

## 2018-02-09 RX ADMIN — CEFAZOLIN SODIUM 2 G: 2 INJECTION, SOLUTION INTRAVENOUS at 11:28

## 2018-02-09 RX ADMIN — OXYTOCIN-SODIUM CHLORIDE 0.9% IV SOLN 30 UNIT/500ML 300 ML/HR: 30-0.9/5 SOLUTION at 11:43

## 2018-02-09 RX ADMIN — BUPIVACAINE HYDROCHLORIDE AND EPINEPHRINE BITARTRATE 20 ML: 2.5; .005 INJECTION, SOLUTION INFILTRATION; PERINEURAL at 13:10

## 2018-02-09 RX ADMIN — PHENYLEPHRINE HYDROCHLORIDE 100 MCG: 10 INJECTION, SOLUTION INTRAMUSCULAR; INTRAVENOUS; SUBCUTANEOUS at 11:28

## 2018-02-09 RX ADMIN — PHENYLEPHRINE HYDROCHLORIDE 100 MCG: 10 INJECTION, SOLUTION INTRAMUSCULAR; INTRAVENOUS; SUBCUTANEOUS at 11:30

## 2018-02-09 RX ADMIN — BUPIVACAINE 20 ML: 13.3 INJECTION, SUSPENSION, LIPOSOMAL INFILTRATION at 13:10

## 2018-02-09 RX ADMIN — Medication 10 MG: at 11:58

## 2018-02-09 RX ADMIN — METHYLERGONOVINE MALEATE 200 MCG: 0.2 INJECTION INTRAMUSCULAR; INTRAVENOUS at 11:55

## 2018-02-09 RX ADMIN — PHENYLEPHRINE HYDROCHLORIDE 100 MCG: 10 INJECTION, SOLUTION INTRAMUSCULAR; INTRAVENOUS; SUBCUTANEOUS at 11:42

## 2018-02-09 RX ADMIN — PHENYLEPHRINE HYDROCHLORIDE 100 MCG: 10 INJECTION, SOLUTION INTRAMUSCULAR; INTRAVENOUS; SUBCUTANEOUS at 11:39

## 2018-02-09 RX ADMIN — ONDANSETRON 4 MG: 2 INJECTION INTRAMUSCULAR; INTRAVENOUS at 11:47

## 2018-02-09 NOTE — BRIEF OP NOTE
[facility]  Delivery  Brief Operative Note    Patient name: Kitty Ibarra  Patient MRN: [id number]  Patient : 1975    Surgery date: 2018    Surgeon: Karen Nieves MD  Assistants: Janey Daley MD PGY2    Pre-operative diagnosis  1.  at 36w2d  2. History of  x2  3. History of myomectomy   4. AMA  5.  contractions, s/p BMZ course    Post-operative diagnosis  1. Same  2. Liveborn male infant    Procedure: Repeat low-transverse  section with double layer uterine closure via Pfannensteil incision    Anesthesia: Spinal    QBL: 852 mL  IVF: 2300 mL  UOP: 50 mL clear urine at end of the case    Drains: Ayers catheter    Specimens: Cord segment, Cord gases    Complications: None    Findings:   1. Minimal/no adhesions  2. Uterus with multiple fibroids, largest 7cm at the right lateral aspect of uterus extending into broad ligament. Normal fallopian tubes, ovaries  3. Clear amniotic fluid  4. Liveborn male infant in cephalic presentation. APGARs 4 at 1 min and 9 at 5 min. Weight pending  5. Arterial cord pH 7.22, base deficit 4.2. Venous cord pH 7.25, base deficit 2.9.     Janey Daley MD   Resident Physician, PGY2  Obstetrics, Gynecology, and Women's Health

## 2018-02-09 NOTE — IP AVS SNAPSHOT
[facility]    [address] [facility] [address]    Phone:  [phone]                                       After Visit Summary   2/9/2018    Kitty Ibarra    MRN: [id number]           After Visit Summary Signature Page     I have received my discharge instructions, and my questions have been answered. I have discussed any challenges I see with this plan with the nurse or doctor.    ..........................................................................................................................................  Patient/Patient Representative Signature      ..........................................................................................................................................  Patient Representative Print Name and Relationship to Patient    ..................................................               ................................................  Date                                            Time    ..........................................................................................................................................  Reviewed by Signature/Title    ...................................................              ..............................................  Date                                                            Time

## 2018-02-09 NOTE — PLAN OF CARE
Data: Kitty Ibarra transferred to [place] [address]  via wheelchair at 1530. Baby transferred via parent's arms.  Action: Receiving unit notified of transfer: Yes. Patient and family notified of room change. Report given to DUSTY Casiano  at bedside. Belongings sent to receiving unit. Accompanied by Registered Nurse. Oriented patient to surroundings. Call light within reach. ID bands double-checked with receiving RN.  Response: Patient tolerated transfer and is stable.

## 2018-02-09 NOTE — ANESTHESIA CARE TRANSFER NOTE
Patient: Kitty Ibarra    Procedure(s):  Repeat  Section  - Wound Class: II-Clean Contaminated    Diagnosis: Previous Myomectomy and    Diagnosis Additional Information: No value filed.    Anesthesia Type:   Spinal     Note:  Airway :Room Air  Patient transferred to:PACU  Comments: Patient resting comfortably, breathing spontaneously.  Oumar Quigley  Handoff Report: Identifed the Patient, Identified the Reponsible Provider, Reviewed the pertinent medical history, Discussed the surgical course, Reviewed Intra-OP anesthesia mangement and issues during anesthesia, Set expectations for post-procedure period and Allowed opportunity for questions and acknowledgement of understanding      Vitals: (Last set prior to Anesthesia Care Transfer)    CRNA VITALS  2018 1156 - 2018 1237      2018             Pulse: 75    SpO2: 100 %                Electronically Signed By: Oumar Quigley MD  2018  12:37 PM

## 2018-02-09 NOTE — OP NOTE
[facility]  Full Operative Progress Note        Patient name: Kitty Ibarra  Patient MRN: [id number]  Patient : 1975     Surgery date: 2018     Surgeon: Karen Nieves MD  Assistants: Janey Daley MD PGY2     Pre-operative diagnosis  1.  at 36w2d  2. History of  x2  3. History of myomectomy   4. AMA  5.  contractions, s/p BMZ course  6. Transverse lie     Post-operative diagnosis  1. Same  2. Liveborn male infant     Procedure: Repeat low-transverse  section with double layer uterine closure via Pfannensteil incision with intraoperative cephalic version     Anesthesia: Spinal     QBL: 852 mL  IVF: 2300 mL  UOP: 50 mL clear urine at end of the case     Drains: Ayers catheter     Specimens: Cord segment, Cord gases     Complications: None     Findings:   1. Minimal/no adhesions  2. Uterus with multiple fibroids, largest 7cm at the right lateral aspect of uterus extending into broad ligament. Normal fallopian tubes, ovaries  3. Clear amniotic fluid  4. Liveborn male infant in cephalic presentation. APGARs 4 at 1 min and 9 at 5 min. Weight pending  5. Arterial cord pH 7.22, base deficit 4.2. Venous cord pH 7.25, base deficit 2.9.     Indications  Kitty Ibarra is a 42 year old  at 36w2d admitted for scheduled repeat .  The risks, benefits, and alternatives of  section were discussed with the patient, and she agreed to proceed.     Procedure Details:   The patient was brought to the OR, where adequate spinal anesthesia was administered. She received 2g Ancef for surgical infection prophylaxis. She was placed in the dorsal supine position with a slight leftward tilt. She was prepped and draped in the usual sterile fashion. A surgical time out was performed. A pfannenstiel skin incision was made with the scalpel, and carried down to the underlying fascia with sharp and blunt dissection. The fascia was  incised in the midline, and the incision was extended laterally with the Wise scissors. The superior aspect of the fascia was grasped with the Kocher clamps and dissected off of the underlying rectus muscles with blunt and sharp dissection. Attention was then turned to the inferior aspect of the fascia, which was similarly dissected off of the underlying rectus muscles. The rectus muscles were  in the midline, and the peritoneum was entered bluntly, and the opening was extended with digital pressure. The bladder blade was placed. The vesicouterine peritoneum was incised in the midline, and the incision was extended laterally with the Metzenbaum scissors. A bladder flap was created digitally and the bladder blade was replaced. Palpation of the uterus revealed multiple myomata and transverse lie. Fetus was easily verted to cephalic prior to uterine incision.     A transverse hysterotomy was made with the scalpel in the lower uterine segment, and the incision was extended with digital pressure. The infant was noted to be in vertex presentation, and was delivered atraumatically. After 30-60 second delay, the cord was doubly clamped and cut, and the infant was handed off to the awaiting nursery staff. The placenta was delivered with gentle traction on the umbilical cord and uterine massage. The uterus was exteriorized and cleared of all clots and debris. Uterine tone was noted to be firm with pitocin given through the running IV and uterine massage.  The hysterotomy was closed with a running locked suture of 0 Vicryl.  The hysterotomy was then imbricated using an 0 Monocryl suture. The hysterotomy was noted to be hemostatic. The posterior cul-de-sac was cleared of all clots and debris. The uterus was returned to the abdomen. The pericolic gutters were cleared of all clots and debris. The hysterotomy was reexamined and noted to be hemostatic. The fascia and rectus muscles were examined and areas of oozing were  controlled with electrocautery. The fascia was closed with a running 0 Vicryl suture. The subcutaneous tissue was irrigated and areas of oozing were controlled with electrocautery.  The skin was closed with 4-0 Monocryl and covered with Steristrips. The incision was covered with an ABD pad and dressing    Dr. Nieves was present for the entirety of the procedure.    Janey Daley MD   Resident Physician, PGY2  Obstetrics, Gynecology, and Women's Health    I was present and scrubbed through fascial closure. I was immediately available during skin closure.   Karen Nieves

## 2018-02-09 NOTE — IP AVS SNAPSHOT
MRN:[id number]                      After Visit Summary   2018    Kitty Ibarra    MRN: [id number]           Thank you!     Thank you for choosing [facility] for your care. Our goal is always to provide you with excellent care. Hearing back from our patients is one way we can continue to improve our services. Please take a few minutes to complete the written survey that you may receive in the mail after you visit with us. Thank you!        Patient Information     Date Of Birth          1975        Designated Caregiver       Most Recent Value    Caregiver    Will someone help with your care after discharge? no      About your hospital stay     You were admitted on:  2018 You last received care in the:  [facility]    You were discharged on:  2018        Reason for your hospital stay       Maternity care                  Who to Call     For medical emergencies, please call 911.  For non-urgent questions about your medical care, please call your primary care provider or clinic, [phone]  For questions related to your surgery, please call your surgery clinic        Attending Provider     Provider Specialty    Karen Nieves MD OB/Gyn       Primary Care Provider Office Phone # Fax #    Noel Mayfield [phone]      After Care Instructions     Activity       Review discharge instructions            Diet       Resume previous diet            Discharge Instructions - Postpartum visit       Schedule postpartum visit with your provider and return to clinic in 6 weeks.                  Follow-up Appointments     Follow Up and recommended labs and tests       Follow-up for 6 week postpartum visit                  Further instructions from your care team       Postop  Birth Instructions    Activity       Do not lift more than 10 pounds for 6 weeks after surgery.  Ask family and friends for help when you need it.    No driving  until you have stopped taking your pain medications (usually two weeks after surgery).    No heavy exercise or activity for 6 weeks.  Don't do anything that will put a strain on your surgery site.    Don't strain when using the toilet.  Your care team may prescribe a stool softener if you have problems with your bowel movements.     To care for your incision:       Keep the incision clean and dry.    Do not soak your incision in water. No swimming or hot tubs until it has fully healed. You may soak in the bathtub if the water level is below your incision.    Do not use peroxide, gel, cream, lotion, or ointment on your incision.    Adjust your clothes to avoid pressure on your surgery site (check the elastic in your underwear for example).     You may see a small amount of clear or pink drainage and this is normal.  Check with your health care provider:       If the drainage increases or has an odor.    If the incision reddens, you have swelling, or develop a rash.    If you have increased pain and the medicine we prescribed doesn't help.    If you have a fever above 100.4 F (38 C) with or without chills when placing thermometer under your tongue.   The area around your incision (surgery wound), will feel numb.  This is normal. The numbness should go away in less than a year.     Keep your hands clean:  Always wash your hands before touching your incision (surgery wound). This helps reduce your risk of infection. If your hands aren't dirty, you may use an alcohol hand-rub to clean your hands. Keep your nails clean and short.    Call your healthcare provider if you have any of these symptoms:       You soak a sanitary pad with blood within 1 hour, or you see blood clots larger than a golf ball.    Bleeding that lasts more than 6 weeks.    Vaginal discharge that smells bad.    Severe pain, cramping or tenderness in your lower belly area.    A need to urinate more frequently (use the toilet more often), more urgently  (use the toilet very quickly), or it burns when you urinate.    Nausea and vomiting.    Redness, swelling or pain around a vein in your leg.    Problems breastfeeding or a red or painful area on your breast.    Chest pain and cough or are gasping for air.    Problems with coping with sadness, anxiety or depression. If you have concerns about hurting yourself or the baby, call your provider immediately.      You have questions or concerns after you return home.                  Pending Results     No orders found from 2/7/2018 to 2/10/2018.            Statement of Approval     Ordered          02/12/18 1004  I have reviewed and agree with all the recommendations and orders detailed in this document.  EFFECTIVE NOW     Approved and electronically signed by:  Joanna Hoff MD             Admission Information     Date & Time Provider Department Dept. Phone    2/9/2018 Karen Nieves MD [facility] [phone]      Your Vitals Were     Blood Pressure Pulse Temperature Respirations Last Period Pulse Oximetry    120/86 90 98.2  F (36.8  C) (Oral) 16 03/01/2017 98%      MyChart Information     [employer] gives you secure access to your electronic health record. If you see a primary care provider, you can also send messages to your care team and make appointments. If you have questions, please call your primary care clinic.  If you do not have a primary care provider, please call [phone] and they will assist you.        Care EveryWhere ID     This is your Care EveryWhere ID. This could be used by other organizations to access your [place] medical records  [id number]        Equal Access to Services     [facility] : Hadii jn menon Sosallie, waaxda luqadaha, qaybta kaalmada pranav, delmar sommers . So [facility] [phone].    ATENCIÓN: Si habla español, tiene a lopez disposición servicios gratuitos de asistencia lingüística. Llame al [phone].    We comply with applicable  federal civil rights laws and [place] laws. We do not discriminate on the basis of race, color, national origin, age, disability, sex, sexual orientation, or gender identity.               Review of your medicines      START taking        Dose / Directions    ibuprofen 600 MG tablet   Commonly known as:  ADVIL/MOTRIN        Dose:  600 mg   Take 1 tablet (600 mg) by mouth every 6 hours as needed for moderate pain   Quantity:  30 tablet   Refills:  1       oxyCODONE IR 5 MG tablet   Commonly known as:  ROXICODONE        Dose:  5 mg   Take 1 tablet (5 mg) by mouth every 4 hours as needed for pain   Quantity:  18 tablet   Refills:  0       senna-docusate 8.6-50 MG per tablet   Commonly known as:  SENOKOT-S;PERICOLACE        Dose:  1 tablet   Take 1 tablet by mouth 2 times daily as needed for constipation   Quantity:  40 tablet   Refills:  1         CONTINUE these medicines which have NOT CHANGED        Dose / Directions    prenatal multivitamin plus iron 27-0.8 MG Tabs per tablet        Dose:  1 tablet   Take 1 tablet by mouth daily   Refills:  0       Vitamin D (Cholecalciferol) 1000 UNITS Caps        Refills:  0            Where to get your medicines      These medications were sent to [facility] [address]  [address] [address]     Phone:  [phone]     ibuprofen 600 MG tablet    senna-docusate 8.6-50 MG per tablet         Some of these will need a paper prescription and others can be bought over the counter. Ask your nurse if you have questions.     Bring a paper prescription for each of these medications     oxyCODONE IR 5 MG tablet                Protect others around you: Learn how to safely use, store and throw away your medicines at www.disposemymeds.org.        Information about OPIOIDS     PRESCRIPTION OPIOIDS: WHAT YOU NEED TO KNOW    Prescription opioids can be used to help relieve moderate to severe pain and are often prescribed  following a surgery or injury, or for certain health conditions. These medications can be an important part of treatment but also come with serious risks. It is important to work with your health care provider to make sure you are getting the safest, most effective care.    WHAT ARE THE RISKS AND SIDE EFFECTS OF OPIOID USE?  Prescription opioids carry serious risks of addiction and overdose, especially with prolonged use. An opioid overdose, often marked by slowed breathing can cause sudden death. The use of prescription opioids can have a number of side effects as well, even when taken as directed:      Tolerance - meaning you might need to take more of a medication for the same pain relief    Physical dependence - meaning you have symptoms of withdrawal when a medication is stopped    Increased sensitivity to pain    Constipation    Nausea, vomiting, and dry mouth    Sleepiness and dizziness    Confusion    Depression    Low levels of testosterone that can result in lower sex drive, energy, and strength    Itching and sweating    RISKS ARE GREATER WITH:    History of drug misuse, substance use disorder, or overdose    Mental health conditions (such as depression or anxiety)    Sleep apnea    Older age (65 years or older)    Pregnancy    Avoid alcohol while taking prescription opioids.   Also, unless specifically advised by your health care provider, medications to avoid include:    Benzodiazepines (such as Xanax or Valium)    Muscle relaxants (such as Soma or Flexeril)    Hypnotics (such as Ambien or Lunesta)    Other prescription opioids    KNOW YOUR OPTIONS:  Talk to your health care provider about ways to manage your pain that do not involve prescription opioids. Some of these options may actually work better and have fewer risks and side effects:    Pain relievers such as acetaminophen, ibuprofen, and naproxen    Some medications that are also used for depression or seizures    Physical therapy and  exercise    Cognitive behavioral therapy, a psychological, goal-directed approach, in which patients learn how to modify physical, behavioral, and emotional triggers of pain and stress    IF YOU ARE PRESCRIBED OPIOIDS FOR PAIN:    Never take opioids in greater amounts or more often than prescribed    Follow up with your primary health care provider and work together to create a plan on how to manage your pain.    Talk about ways to help manage your pain that do not involve prescription opioids    Talk about all concerns and side effects    Help prevent misuse and abuse    Never sell or share prescription opioids    Never use another person's prescription opioids    Store prescription opioids in a secure place and out of reach of others (this may include visitors, children, friends, and family)    Visit www.cdc.gov/drugoverdose to learn about risks of opioid abuse and overdose    If you believe you may be struggling with addiction, tell your health care provider and ask for guidance or call [facility]'s National Helpline at 3-060-085-HELP    LEARN MORE / www.cdc.gov/drugoverdose/prescribing/guideline.html    Safely dispose of unused prescription opioids: Find your local drug take-back programs and more information about the importance of safe disposal at www.doseofreality.[place].gov             Medication List: This is a list of all your medications and when to take them. Check marks below indicate your daily home schedule. Keep this list as a reference.      Medications           Morning Afternoon Evening Bedtime As Needed    ibuprofen 600 MG tablet   Commonly known as:  ADVIL/MOTRIN   Take 1 tablet (600 mg) by mouth every 6 hours as needed for moderate pain   Last time this was given:  800 mg on 2/12/2018  8:16 AM                                oxyCODONE IR 5 MG tablet   Commonly known as:  ROXICODONE   Take 1 tablet (5 mg) by mouth every 4 hours as needed for pain                                prenatal multivitamin plus  iron 27-0.8 MG Tabs per tablet   Take 1 tablet by mouth daily                                senna-docusate 8.6-50 MG per tablet   Commonly known as:  SENOKOT-S;PERICOLACE   Take 1 tablet by mouth 2 times daily as needed for constipation   Last time this was given:  2 tablets on 2/11/2018  8:52 AM                                Vitamin D (Cholecalciferol) 1000 UNITS Caps

## 2018-02-09 NOTE — H&P
[facility]  L&D History and Physical    Patient's last menstrual period was 2017.  Estimated Date of Delivery: Mar 7, 2018   Gestational age:  36w1d   Obstetric History:              History of Present Illness    Kitty Ibarra is a 42 year old  at 36w2d by 8w1d US who presents today for scheduled repeat . She does not want a tubal ligation.     Denies fevers, chills, headaches, vision changes/spots, chest pain, shortness of breath, upper abdominal pain, contractions, nausea, vomiting, diarrhea, constipation, vaginal bleeding, leaking fluid from vagina.  Active fetus.      Her pregnancy is complicated by:  1. History of  x2- first for placenta previa and history of myomectomy  2. Myomatous uterus, history of myomectomy   1. US at 18w0d: right lateral 7.6x6.8x6.1cm; right posterior 5x5.3x4.3cm; left lateral 1.9x1.6x1cm  3. AMA- normal cell free DNA. Growth US at 33w2d: EFW 2387g (71%ile)  4. Pretrem contractions in this pregnancy, s/p BMZ -    OB history notable for:   Obstetric History       T0      L2     SAB0   TAB0   Ectopic0   Multiple0   Live Births2       # Outcome Date GA Lbr Hutner/2nd Weight Sex Delivery Anes PTL Lv   3 Current            2   36w0d  2.948 kg (6 lb 8 oz) M CS-LTranv   TONY      Name: Kegean   1   36w0d  2.523 kg (5 lb 9 oz) M CS-LTranv   TONY      Name: Daniele      Complications: Placenta previa      Obstetric Comments   No GDM, HTN, PPH.        Prenatal Labs  Recent Labs   Lab Test  18   1359   ABO  O   RH  Pos   AS  Neg    Rhogam not indicated     GCT:  passed  GBS: negative  Rubella: immune  RPR/HIV/HBsAg: NR  GC/CT: Negative  Genetic screening: normal cell free DNA     Weight  Pre-pregnancy BMI: 20.1  Current BMI: 24          Past Medical History     Past Medical History:   Diagnosis Date     NO ACTIVE PROBLEMS             Past Surgical History     Past Surgical History:   Procedure Laterality Date      BIOPSY BREAST  1991    benign      SECTION  ,      MYOMECTOMY UTERUS  2006    Uterine fibroids            Medications     Prior to Admission Medications   Prescriptions Last Dose Informant Patient Reported? Taking?   Prenatal Vit-Fe Fumarate-FA (PRENATAL MULTIVITAMIN  PLUS IRON) 27-0.8 MG TABS per tablet 2018 at Unknown time  Yes Yes   Sig: Take 1 tablet by mouth daily   Vitamin D, Cholecalciferol, 1000 UNITS CAPS 2018 at Unknown time  Yes Yes      Facility-Administered Medications: None       Allergies   Allergen Reactions     Nkda [No Known Drug Allergies]             Social History     Social History   Substance Use Topics     Smoking status: Never Smoker     Smokeless tobacco: Never Used     Alcohol use No            Review of Systems   Pertinent positives and negatives as described above in HPI           Physical Exam     Vitals:    18 0911   BP: 130/82   Pulse: 113   Temp: 98.9  F (37.2  C)   TempSrc: Oral     Gen: Pleasant, gravid woman.  Sitting up in bed, in NAD.  Heart:: regular rate and rhythm without murmur, gallop or rub    Lungs: CTA B/L, no wheezing or crackles.  Abdomen: Non-tender, Gravid, Transverse with fetal head to maternal left and fetal back down. EFW 6.5-7# by Leopold  Extremities: trace edema     Contraction frequency: 0 in 10 minutes  FHR:  Rate 130, moderate variability, +accelerations, no decelerations.            Category: 1         Laboratory/Imaging     Component      Latest Ref Rng & Units 2018   WBC      4.0 - 11.0 10e9/L 8.6   RBC Count      3.8 - 5.2 10e12/L 4.19   Hemoglobin      11.7 - 15.7 g/dL 12.4   Hematocrit      35.0 - 47.0 % 38.9   MCV      78 - 100 fl 93   MCH      26.5 - 33.0 pg 29.6   MCHC      31.5 - 36.5 g/dL 31.9   RDW      10.0 - 15.0 % 13.3   Platelet Count      150 - 450 10e9/L 162     Ultrasounds:  -Dating US: 8w1d  -Anatomy US (18w0d): Multiple myomas, normal anatomy, posterior placenta  -Growth US (33w2d): EFW 2387g  (71%ile)         Assessment and Plan   Kitty Ibarra is a 42 year old  at 36w2d by 8w1d US presenting for scheduled repeat . The risks (bleeding, infection, injury to surrounding organs), benefits and alternatives were discussed and informed consent was signed.    Fetal Status: Category 1, reactive    RLTCS  - NPO  - IVF  - Ancef 2g for pre-op antibiotics  - Anesthesia consult for spinal    Post-partum management  - Advance diet as tolerated  - Rubella immune   - Flu and Tdap up to date  - Rh positive     Patient seen and discussed with Dr. Nieves.    Janey Daley MD   Resident Physician, PGY2  Obstetrics, Gynecology, and Women's Health    I have seen, examined, and counseled the patient. Reviewed transverse lie and possibility of vertical skin or uterine incision or extension of either.   Patient understands.   Karen Nieves

## 2018-02-09 NOTE — PLAN OF CARE
AM admit for scheduled Cesarian Section.  Indication repeat.  EFM applied with fetal heart rate baseline 120 and fetus active.  Accelspresent  Decels absent* VS stable  IV  LR started 1000cc/hr With #18 Gauge.  Doctors informed of arrival.

## 2018-02-09 NOTE — DISCHARGE SUMMARY
[facility] Discharge Summary    Kitty Ibarra MRN# [id number]   Age: 42 year old YOB: 1975     Date of Admission:  2018  Date of Discharge:  2018  Admitting Physician:  Karen Nieves MD  Discharge Physician:  Joanna Hoff MD    Admit Dx:   - Intrauterine pregnancy at 36w2d   - History of  x2  - History of myomectomy, fibroid uterus  - Advanced maternal age   -  contractions s/p course of betamethasone     Discharge Dx:  - Same as above, s/p RLTCS     Procedures:  - repeat low-transverse   - Spinal anesthesia    Admit HPI:  Kitty Ibarra is a 42 year old  at 36w2d by 8w1d US who presents today for scheduled repeat . Denies fevers, chills, headaches, vision changes/spots, chest pain, shortness of breath, upper abdominal pain, contractions, nausea, vomiting, diarrhea, constipation, vaginal bleeding, leaking fluid from vagina. Please see her Admission H&P and Delivery Summary for further details.    Operative Findings  1.   2. Minimal/no adhesions  3. Uterus with multiple fibroids, largest 7cm at the right lateral aspect of uterus extending into broad ligament. Normal fallopian tubes, ovaries  4. Clear amniotic fluid  5. Liveborn male infant in cephalic presentation. APGARs 4 at 1 min and 9 at 5 min. Weight 2840 grams.   6. Arterial cord pH 7.22, base deficit 4.2. Venous cord pH 7.25, base deficit 2.9.     Postpartum Course:  Her postpartum course was uncomplicated. On PPD#3, she was meeting all of her postpartum goals and deemed stable for discharge. She was voiding without difficulty, tolerating a regular diet without nausea and vomiting, her pain was well controlled on oral pain medicines and her lochia was appropriate. Her hemoglobin prior to delivery was 12.4 and after delivery was 11. Her Rh status was positive, and Rhogam was not indicated.   She had tachycardia throughout her  admission that was noted in the chart previously going back >5 years.  She was asymptomatic and with normal O2 sats.  An EKG showed sinus tachycardia.    Discharge Medications:   Kitty Ibarra   Home Medication Instructions SOLO:[id number]    Printed on:02/10/18 2032   Medication Information                      ibuprofen (ADVIL/MOTRIN) 600 MG tablet  Take 1 tablet (600 mg) by mouth every 6 hours as needed for moderate pain             oxyCODONE IR (ROXICODONE) 5 MG tablet  Take 1 tablet (5 mg) by mouth every 4 hours as needed for pain             Prenatal Vit-Fe Fumarate-FA (PRENATAL MULTIVITAMIN  PLUS IRON) 27-0.8 MG TABS per tablet  Take 1 tablet by mouth daily             senna-docusate (SENOKOT-S;PERICOLACE) 8.6-50 MG per tablet  Take 1 tablet by mouth 2 times daily as needed for constipation             Vitamin D, Cholecalciferol, 1000 UNITS CAPS                 Pain Assessment:  Current Pain Score 2/12/2018 2/12/2018 2/12/2018   Patient currently in pain? yes yes yes   Pain location - Abdomen Abdomen   Pain descriptors - Dull;Discomfort Discomfort   - Kitty is experiencing pain due to surgery. Pain management was discussed and the plan was created in a collaborative fashion.  Kitty's response to the current recommendations: engaged  - Opioid regimen: oxycodone  - Response to opioid medications: Reduction of symptoms  - Bowel regimen: senna and docusate        Discharge/Disposition:  Kitty Ibarra was discharged to home in stable condition with the following instructions/medications:  1) Call for temperature > 100.4, bright red vaginal bleeding >1 pad an hour x 2 hours, foul smelling vaginal discharge, pain not controlled by usual oral pain meds, persistent nausea and vomiting not controlled on medications  2) She planned vasectomy for contraception.  3) For feeding she decided to breastfeed.  4) She was instructed to follow-up with her primary OB in 6 weeks for a routine postpartum  visit.    Naomi Gomez MD  PGY-2 OB/GYN  [phone] (OB G2 Pager)    Appreciate note by Dr. Gomez. Patient has been seen and examined by me separate from the resident, agree with above note.     Joanna Hoff MD  11:01 AM

## 2018-02-09 NOTE — ANESTHESIA PROCEDURE NOTES
Spinal/LP Procedure Note    Spinal Block  Staff:     Anesthesiologist:  CHERYL HUDSON    Resident/CRNA:  BINH VOSS    Spinal/LP performed by resident/CRNA in presence of a teaching physician.    Location: OR  Procedure Start/Stop Times:      2/9/2018 11:20 AM     2/9/2018 11:28 AM    patient identified, IV checked, site marked, risks and benefits discussed, informed consent, monitors and equipment checked, pre-op evaluation, at physician/surgeon's request and post-op pain management      Correct Patient: Yes      Correct Position: Yes      Correct Site: Yes      Correct Procedure: Yes      Correct Laterality:  Yes    Site Marked:  Yes  Procedure:     Procedure:  Intrathecal    ASA:  1    Position:  Sitting    Sterile Prep: chloraprep, mask, sterile gloves and patient draped      Insertion site:  L2-3    Approach:  Midline    Needle Type:  Abdifatah    Needle gauge (G):  25    Local Skin Infiltration:  1% lidocaine    Needle Length (in):  3.5    Introducer used: Yes      Introducer gauge:  20 G    Attempts:  1    Redirects:  0    CSF:  Clear    Paresthesias:  No

## 2018-02-09 NOTE — ANESTHESIA POSTPROCEDURE EVALUATION
Patient: Kitty Ibarra    Procedure(s):  Repeat  Section  - Wound Class: II-Clean Contaminated    Diagnosis:Previous Myomectomy and    Diagnosis Additional Information: No value filed.    Anesthesia Type:  Spinal    Note:  Anesthesia Post Evaluation    Patient location during evaluation: OB PACU  Patient participation: Able to fully participate in evaluation  Level of consciousness: awake  Pain management: adequate  Airway patency: patent  Cardiovascular status: acceptable  Respiratory status: acceptable  Hydration status: acceptable  PONV: none     Anesthetic complications: None          Last vitals:  Vitals:    18 1450 18 1451 18 1452   BP:      Pulse:      Resp: 26 16 28   Temp:      SpO2:            Electronically Signed By: Danielle Byers MD  2018  2:56 PM

## 2018-02-09 NOTE — ANESTHESIA PROCEDURE NOTES
Peripheral Nerve Block Procedure Note    Staff:     Anesthesiologist:  CHERYL HUDSON    Resident/CRNA:  BINH VOSS    Block performed by resident/CRNA in the presence of a teaching physician    Location: PACU  Procedure Start/Stop TImes:      2/9/2018 1:00 PM     2/9/2018 1:10 PM    patient identified, IV checked, site marked, risks and benefits discussed, informed consent, monitors and equipment checked, pre-op evaluation, at physician/surgeon's request and post-op pain management      Correct Patient: Yes      Correct Position: Yes      Correct Site: Yes      Correct Procedure: Yes      Correct Laterality:  Yes    Site Marked:  Yes  Procedure details:     Procedure:  TAP    ASA:  1    Laterality:  Bilateral    Position:  Supine    Sterile Prep: chloraprep, patient draped, mask and sterile gloves      Local skin infiltration:  None    Insertion Site:  T11-12    Needle gauge:  21    Needle length (mm):  80    Ultrasound: Yes      Ultrasound used to identify targeted nerve, plexus, or vascular structure and placed a needle adjacent to it      Permanent Image entered into patiient's record      Abnormal pain on injection: No      Blood Aspirated: No      Paresthesias:  No    Bleeding at site: No      Complications:  None

## 2018-02-10 LAB
HGB BLD-MCNC: 11 G/DL (ref 11.7–15.7)
T PALLIDUM IGG+IGM SER QL: NEGATIVE

## 2018-02-10 PROCEDURE — 25000132 ZZH RX MED GY IP 250 OP 250 PS 637: Performed by: STUDENT IN AN ORGANIZED HEALTH CARE EDUCATION/TRAINING PROGRAM

## 2018-02-10 PROCEDURE — 36415 COLL VENOUS BLD VENIPUNCTURE: CPT | Performed by: STUDENT IN AN ORGANIZED HEALTH CARE EDUCATION/TRAINING PROGRAM

## 2018-02-10 PROCEDURE — 85018 HEMOGLOBIN: CPT | Performed by: STUDENT IN AN ORGANIZED HEALTH CARE EDUCATION/TRAINING PROGRAM

## 2018-02-10 PROCEDURE — 25000128 H RX IP 250 OP 636: Performed by: STUDENT IN AN ORGANIZED HEALTH CARE EDUCATION/TRAINING PROGRAM

## 2018-02-10 PROCEDURE — 12000030 ZZH R&B OB INTERMEDIATE UMMC

## 2018-02-10 RX ORDER — AMOXICILLIN 250 MG
1 CAPSULE ORAL 2 TIMES DAILY PRN
Qty: 40 TABLET | Refills: 1 | Status: SHIPPED | OUTPATIENT
Start: 2018-02-10 | End: 2018-04-05

## 2018-02-10 RX ORDER — OXYCODONE HYDROCHLORIDE 5 MG/1
5 TABLET ORAL EVERY 4 HOURS PRN
Qty: 18 TABLET | Refills: 0 | Status: SHIPPED | OUTPATIENT
Start: 2018-02-10 | End: 2018-04-05

## 2018-02-10 RX ORDER — IBUPROFEN 600 MG/1
600 TABLET, FILM COATED ORAL EVERY 6 HOURS PRN
Qty: 30 TABLET | Refills: 1 | Status: SHIPPED | OUTPATIENT
Start: 2018-02-10 | End: 2018-04-05

## 2018-02-10 RX ADMIN — ACETAMINOPHEN 975 MG: 325 TABLET, FILM COATED ORAL at 19:00

## 2018-02-10 RX ADMIN — KETOROLAC TROMETHAMINE 30 MG: 30 INJECTION, SOLUTION INTRAMUSCULAR at 00:57

## 2018-02-10 RX ADMIN — IBUPROFEN 800 MG: 800 TABLET ORAL at 19:00

## 2018-02-10 RX ADMIN — ACETAMINOPHEN 975 MG: 325 TABLET, FILM COATED ORAL at 10:28

## 2018-02-10 RX ADMIN — SENNOSIDES AND DOCUSATE SODIUM 2 TABLET: 8.6; 5 TABLET ORAL at 19:00

## 2018-02-10 RX ADMIN — ACETAMINOPHEN 975 MG: 325 TABLET, FILM COATED ORAL at 02:21

## 2018-02-10 RX ADMIN — SENNOSIDES AND DOCUSATE SODIUM 1 TABLET: 8.6; 5 TABLET ORAL at 08:35

## 2018-02-10 RX ADMIN — KETOROLAC TROMETHAMINE 30 MG: 30 INJECTION, SOLUTION INTRAMUSCULAR at 06:52

## 2018-02-10 RX ADMIN — IBUPROFEN 800 MG: 800 TABLET ORAL at 13:09

## 2018-02-10 NOTE — PROGRESS NOTES
[facility]   Post-partum Note    Name:  Kitty Ibarra  MRN: [id number]    S: Patient is doing well.  Continuing to work on pain control with movement.  Would like to ambulate more today.  Tolerating regular diet without nausea or vomiting.  Ambulating within room without dizziness.  Lochia is minimal.  Breastfeeding well.  Baby is in room, but being monitoring for blood glucoses.      O:   Patient Vitals for the past 24 hrs:   BP Temp Temp src Pulse Heart Rate Resp SpO2   18 0613 124/87 98.1  F (36.7  C) Oral - 97 16 -   02/10/18 2223 119/79 98.4  F (36.9  C) Oral - 110 16 -   02/10/18 1604 113/75 98.9  F (37.2  C) Oral 90 - 16 -   02/10/18 1100 - - - - - 16 100 %   02/10/18 1002 124/87 99.3  F (37.4  C) Oral - 122 16 100 %   02/10/18 0809 - - - - 96 16 100 %     Gen:  Resting comfortably, NAD  CV:  RRR, no murmur  Pulm:  CTAB, no wheezes  Abd:  Soft, appropriately ttp, non-distended.Fundus is below umbilicus, firm and non-tender with palpable fibroids throughout abdomen.  Incision: C/D/I, no surrounding erythema or drainage noted, steri-strips in place  Ext:  non-tender, trace LE edema b/l    I/O last 3 completed shifts:  In: 2350 [P.O.:2350]  Out: 3950 [Urine:3950]    Hgb:   Hemoglobin   Date Value Ref Range Status   02/10/2018 11.0 (L) 11.7 - 15.7 g/dL Final       Assessment/Plan:  Kitty Ibarra is a 42 year old  on POD#2 s/p RLCTS.  Doing well post-operatively.     -Routine postpartum management  Pain: S/p TAPs. Well-controlled with ibuprofen and tylenol; PRN oxycodone ordered.   Hgb: 12.4> mL> 11, asymptomatic.   GI:  Scheduled bowel regimen ordered.   :  S/p monsivais. Voiding spontaneously.   Rh: Positive, Rhogam not indicated.   Rubella: Immune, MMR not indicated.   Feed: Breast  BC: Vasectomy    Dispo: Anticipate discharge home likely POD#3, continue to work on pain control today.     Cherise Campbell MD  Ob/Gyn, PGY-3    I personally examined  and evaluated Kitty Ibarra on 2/11/2018 independently from the resident.  I discussed the patient with the team and agree with the assessment and plan of care as documented in the above note with edits by me.     Demi Mcleod MD  10:51 AM

## 2018-02-10 NOTE — PLAN OF CARE
Problem: Patient Care Overview  Goal: Plan of Care/Patient Progress Review  Outcome: Improving  Data: Vital signs within normal limits. Postpartum checks within normal limits - see flow record. Patient eating and drinking normally. Ayers intact, output adequate. No apparent signs of infection. C/S dressing clean, dry, and intact.   Action: Patient denies pain, is taking scheduled Tylenol and Ibuprofen.   Response: Positive attachment behaviors observed with infant. Support person, spouse, present. Parents and other children visited earlier this evening.    Plan: Continue with plan of care.

## 2018-02-10 NOTE — PROGRESS NOTES
Postpartum Progress Note  Kitty Ibarra  [id number]    Subjective:   Patient is feeling well.  Lochia minimal. Eating and drinking regular food without nausea/emesis. Ambulating without difficulty. Pain is adequately controlled.  feeding without concerns/questions.  Voiding without difficulty.    Objective:  /84  Pulse 113  Temp 98.9  F (37.2  C) (Oral)  Resp 18  LMP 2017  SpO2 100%  Breastfeeding? Unknown    I/O last 3 completed shifts:  In: 2300 [I.V.:2300]  Out: 902 [Urine:50; Blood:852]    General: lying in bed, NAD, comfortable  Heart/lungs: no increased work of breathing, well perfused  Abdomen: Soft, non-tender, non-distended; fundus firm and below umbilicus  Incision:  Bandage Clean, dry, no surrounding erythema/fluctuance  Extremities: trace edema in BLE    Labs:  Hemoglobin   Date Value Ref Range Status   2018 12.4 11.7 - 15.7 g/dL Final     Hemoglobin   Date Value Ref Range Status   2018 12.4 11.7 - 15.7 g/dL Final   2017 12.2 11.7 - 15.7 g/dL Final       Assessment/Plan: 42 year old  who is POD#1 s/p RLTCS Currently stable and doing well.    - Routine post-partum cares.     - Heme:    Hgb 12.4 > >   Hemoglobin   Date Value Ref Range Status   02/10/2018 11.0 (L) 11.7 - 15.7 g/dL Final     - Baby:  in room doing well   - Contraception: vasectomy    - Rh +   - Rubella immune  - Pain well controlled with TAPS and ibuprofen    Dispo: pending postop goals     Josey Villalpando PGY3  2018 7:29 PM     I have seen and examined the patient without the resident. I have reviewed, edited, and agree with the note.   My findings are:Appears well.   Abdomen: soft, NT, ND.   Incision Bandage clean and dry  Fundus firm, NT    Hemoglobin   Date Value Ref Range Status   02/10/2018 11.0 (L) 11.7 - 15.7 g/dL Final   2018 12.4 11.7 - 15.7 g/dL Final     Karen Nieves MD

## 2018-02-10 NOTE — PLAN OF CARE
Problem: Patient Care Overview  Goal: Plan of Care/Patient Progress Review  Outcome: Improving  Pt doing well. VSS. Postpartum checks WDL. Ayers removed at 0620, awaiting void post catheter removal. IV flushed and saline locked. Pain adequately managed with Tylenol and Toradol. Ambulating without dizziness. Breastfeeding with assistance. Continue with the plan of care.

## 2018-02-10 NOTE — PLAN OF CARE
Patient arrived to [facility] unit via zoom cart at 1530,with belongings, accompanied by spouse/ significant other, with infant in arms. Received report from Jaylin HIGGINBOTHAM and checked bands. Unit and room orientation started. Call light given; no concerns present at this time. Continue with plan of care.

## 2018-02-10 NOTE — PLAN OF CARE
Problem: Patient Care Overview  Goal: Plan of Care/Patient Progress Review  Outcome: Improving  Vital signs stable and postpartum assessment within normal limits. Incision is in tact with dressing in place. Pt is up to the bathroom and attempted to void 3 times. Pt was straight cath for 975 and has voided 450 after that. Pt is breastfeeding well and is not interested using the nipple shield even though she was told with late  the shield is recommended.  Pain is well managed with Ibuprofen and Tylenol. Pt encouraged to empty bladder every 2-3 hours and to hand expressed colostrum for baby after each baby due to low blood sugars. Checked latch and assisted with a deeper latch.Continue cares.

## 2018-02-10 NOTE — PLAN OF CARE
Problem: Patient Care Overview  Goal: Plan of Care/Patient Progress Review  Outcome: Improving  [id number]:  VSS and postpartum assessments WDL with fundus at 3/U to 2/U due to fibroids.  Bonding well with infant.  Breastfeeding on cue with minimal assist for getting a deeper latch, also hand expressing and fingerfeeding to infant after feedings.  Pt denies pain, administered scheduled tylenol and toradol.  Incisional bandage clean, dry and intact.  PIV infusing 2nd bag of pitocin.  Ayers catheter collecting adequate amounts of clear urine.  , Evaristro present and supportive.  Oriented to room and unit routines and welcome folder, EDS placed at bedside.  Will continue with postpartum cares and education per plan of care.

## 2018-02-11 PROCEDURE — 12000030 ZZH R&B OB INTERMEDIATE UMMC

## 2018-02-11 PROCEDURE — 25000132 ZZH RX MED GY IP 250 OP 250 PS 637: Performed by: STUDENT IN AN ORGANIZED HEALTH CARE EDUCATION/TRAINING PROGRAM

## 2018-02-11 RX ADMIN — IBUPROFEN 800 MG: 800 TABLET ORAL at 15:00

## 2018-02-11 RX ADMIN — ACETAMINOPHEN 975 MG: 325 TABLET, FILM COATED ORAL at 18:36

## 2018-02-11 RX ADMIN — SENNOSIDES AND DOCUSATE SODIUM 2 TABLET: 8.6; 5 TABLET ORAL at 08:52

## 2018-02-11 RX ADMIN — IBUPROFEN 800 MG: 800 TABLET ORAL at 02:26

## 2018-02-11 RX ADMIN — ACETAMINOPHEN 975 MG: 325 TABLET, FILM COATED ORAL at 02:26

## 2018-02-11 RX ADMIN — IBUPROFEN 800 MG: 800 TABLET ORAL at 20:49

## 2018-02-11 RX ADMIN — IBUPROFEN 800 MG: 800 TABLET ORAL at 08:52

## 2018-02-11 RX ADMIN — ACETAMINOPHEN 975 MG: 325 TABLET, FILM COATED ORAL at 10:31

## 2018-02-11 NOTE — PLAN OF CARE
Problem: Patient Care Overview  Goal: Plan of Care/Patient Progress Review  Outcome: Therapy, progress toward functional goals as expected  Data: Vital signs within normal limits. Postpartum checks within normal limits - see flow record. Patient eating and drinking normally. Patient able to empty bladder independently and is up ambulating. No BM yet but is passing gas. No apparent signs of infection. Incision healing well. Patient performing self cares and is able to care for infant. Breastfeeding infant independently, hand expressing or pumping after feedings. No longer checking baby's blood sugars--see infant's chart for note on that. Mother relieved to no longer be checking blood sugars.  Action: Patient medicated during the shift for pain and cramping. See MAR. Patient reassessed within 1 hour after each medication and pain was improved - patient stated she was comfortable. Patient education done about signs of hypoglycemia in infant, pain management and importance of ambulating. See flow record.  Response: Positive attachment behaviors observed with infant. Support persons Ender present.   Plan: continue plan of care.

## 2018-02-11 NOTE — PLAN OF CARE
Problem: Patient Care Overview  Goal: Plan of Care/Patient Progress Review  Outcome: Improving  Has good pain control and voiding freely with adequate amount. She showered and took dressing off. Breastfeeding independently and latch was verified. She is also hand expressing with result. Will continue with plan of care.

## 2018-02-11 NOTE — PLAN OF CARE
Problem: Patient Care Overview  Goal: Plan of Care/Patient Progress Review  Outcome: Improving  Has well controlled pain with current meds. She is independent  with self and baby cares. She is pumping and hand expressing with result and supplementing to baby independently. Was given the option of donor milk supplementation by walter Castro since baby's weight loss is > 5% but declined.  Will continue with plan of care and support her needs.

## 2018-02-12 VITALS
OXYGEN SATURATION: 98 % | HEART RATE: 90 BPM | TEMPERATURE: 98.2 F | RESPIRATION RATE: 16 BRPM | SYSTOLIC BLOOD PRESSURE: 120 MMHG | DIASTOLIC BLOOD PRESSURE: 86 MMHG

## 2018-02-12 LAB — INTERPRETATION ECG - MUSE: NORMAL

## 2018-02-12 PROCEDURE — 93010 ELECTROCARDIOGRAM REPORT: CPT | Performed by: INTERNAL MEDICINE

## 2018-02-12 PROCEDURE — 93005 ELECTROCARDIOGRAM TRACING: CPT

## 2018-02-12 PROCEDURE — 25000132 ZZH RX MED GY IP 250 OP 250 PS 637: Performed by: STUDENT IN AN ORGANIZED HEALTH CARE EDUCATION/TRAINING PROGRAM

## 2018-02-12 RX ORDER — ACETAMINOPHEN 325 MG/1
650 TABLET ORAL EVERY 4 HOURS PRN
Qty: 60 TABLET | Refills: 0 | Status: SHIPPED | OUTPATIENT
Start: 2018-02-12 | End: 2022-06-08

## 2018-02-12 RX ADMIN — IBUPROFEN 800 MG: 800 TABLET ORAL at 02:44

## 2018-02-12 RX ADMIN — ACETAMINOPHEN 975 MG: 325 TABLET, FILM COATED ORAL at 02:44

## 2018-02-12 RX ADMIN — IBUPROFEN 800 MG: 800 TABLET ORAL at 08:16

## 2018-02-12 RX ADMIN — ACETAMINOPHEN 975 MG: 325 TABLET, FILM COATED ORAL at 11:07

## 2018-02-12 RX ADMIN — SIMETHICONE CHEW TAB 80 MG 80 MG: 80 TABLET ORAL at 03:21

## 2018-02-12 NOTE — PROVIDER NOTIFICATION
02/12/18 1200   Provider Notification   Provider Name/Title KAYLNA Daley   Method of Notification Electronic Page   Notification Reason Patient Request   Pt requesting to d/c home with Tylenol. Rec'd rx for carmelo, ibu, and senna.

## 2018-02-12 NOTE — PROGRESS NOTES
"[facility] Postpartum Progress Note    , POD#3, rLTCS @ 36w2d    S: Patient states she is doing well.  She does not feel ready to go home because she is \"working on some things with the baby.\"  Pain well controlled with current pain meds and hot packs. Lochia lighter than menses.  Eating and drinking without nausea/vomiting.  She is passing flatus.  BM yesterday.  Denies fevers/chills, dizziness, CP, SOB, difficulty ambulating, difficulty voiding.  Breastfeeding.    O:  Patient Vitals for the past 12 hrs:   BP Temp Temp src Heart Rate Resp   18 2040 128/84 98.8  F (37.1  C) Oral 110 16   18 1504 130/86 98.7  F (37.1  C) Oral 112 18     Gen:  NAD,   CV: tachycardic, regular rhythm  Resp: CTAB, good air movement  Abd: soft, nondistended, nontender, fundus firm at 2cm below umbilicus  Incision:  clean, dry, intact with Steristrips  Ext: non-tender, trace edema, no erythema    Hemoglobin   Date Value Ref Range Status   02/10/2018 11.0 (L) 11.7 - 15.7 g/dL Final   2018 12.4 11.7 - 15.7 g/dL Final     A/P: 42 year old  POD#3 s/p rLTCS for a history of myomectomy, doing well postpartum.    1. Heme: the patient had a preoperative Hgb of 12.4, an QBL of 852cc was associated with delivery.  POD1 Hgb is 11, appropriate.   2. Pain control: pain is well-controlled with Tylenol, Motrin and oxycodone, continue.  Current Pain Score 2018 2018 2018   Patient currently in pain? yes yes denies   Pain location Abdomen Abdomen -   Pain descriptors Dull;Discomfort Discomfort -   - Kitty is experiencing pain due to surgery. Pain management was discussed and the plan was created in a collaborative fashion.  Kitty's response to the current recommendations: engaged  - Please see the plan for pain management as documented above    3. Rh positive/Rubella immune  4. Tachycardia: reviewing past vital signs the patient has had tachycardia previously as early as .  Will do EKG, " low suspicion for arrhythmia.  5. Incision: no concerns  6. Routine postpartum:    encourage breastfeeding    Encourage ambulation    Continue regular diet    Continue bowel regimen    Contraception: vasectomy  7. Dispo: potential discharge home today pending attending physician evaluation    Naomi Gomez MD  PGY-2 OB/GYN  02/12/2018 6:35 AM      Appreciate note by Dr. Gomez. Patient has been seen and examined by me separate from the resident, agree with above note. Peds is keeping baby for 1 additional night. Will discharge mom to stay in room. Discharge instructions reviewed.     Joanna Hoff MD  10:03 AM

## 2018-02-12 NOTE — PLAN OF CARE
Problem: Patient Care Overview  Goal: Plan of Care/Patient Progress Review  Outcome: Therapy, progress toward functional goals as expected  Data: Vital signs within normal limits, HR remains tachy but is normal for patient, O2 99%, plan of EKG in the AM. Postpartum checks within normal limits - see flow record. Patient eating and drinking normally. Patient able to empty bladder independently and is up ambulating. Patient did have BM 2/11, started to have right sided abdominal pain overnight, likely gas pain, gave simethicone and hot packs for comfort. No apparent signs of infection. Incision healing well. Patient performing self cares and is able to care for infant. Breastfeeding and pumping independently.   Action: Patient medicated during the shift for pain. See MAR. Patient reassessed within 1 hour after each medication and pain was improved - patient stated she was comfortable. Patient education done about gas pain management. Encouraged pt to complete [place] and birth certificate.   Response: Positive attachment behaviors observed with infant. Support persons Ender present.   Plan: continue plan of care.

## 2018-02-12 NOTE — LACTATION NOTE
This note was copied from a baby's chart.  C/S @ 36w2d, AGA with weight loss 2.8% @ 24 hrs & 6.5% @ 48 hrs, bili low risk still @ 48 hrs via TcB. Maternal history of breast biopsy (scar approx. 2cm across top of right areola, fibroids, AMA now @ 42 years old,  her 2 others (now 7 & 10 y/o) for 2-3 years each with no difficulty, had excellent supply. All three children were delivered at 36 weeks, did not use nipple shield or need supplements with either of the other children. Breast exam of mom: breasts soft, milk leaks out with palpation; nipples everted, bilateral similar scabs, reports pinching that she can correct after a few latches but still often creased when comes off and hears clicking sound when feeding. Oral exam of infant: palpate limited length of tongue beyond lingual attachment but unable to visualize under tongue, possible tight frenulum? Good mechanics with tongue extension well beyond gumline when suck on finger but very light suck, infant fast asleep after a feeding.    Parents will call with next cues, plan latch assess, work on deeper and do pre and post feed weights. Mom getting 20-30 mL when pumping now. Since milk coming in, will see what infant transfers and encourage shield use if less than expected.       Return visit late in day, mom forgot to call at beginning of two feedings but made it in for 2nd side only test weight. Kitty latched on right side, audible clicking and discomfort so demo how to get deeper. Encourage using rolled blankets under hand to maintain that latch & use gentle massage & compressions to help ease milk transfer. Infant latched well 2nd attempt, mom able to see, feel and hear difference (clicking stopped, heard gulping right away with letdown). He fed for approximately 30 minutes on right side, transferred 35 mL (2895g baby weight with diaper and blanket pre feeding, 2930g post feeding).    Encourage one more test weight to get amount transferred on BOTH  sides prior to d/c but reassure excellent transfer! Only hesitation is that he not have to feed for full hour (when do both sides) so encourage using massage, compressions to ease transfer and help conserve energy. Infant falls asleep, mouth open and arms loose; fully content, right after feeding, zero cues for more. Recommend touching lips after feeding and if he pulls away after good feeding, ok not to give supplement. If any interest, continue to offer supplement one spoonful at a time, feed to satiety and continue with feed on cue but no longer than 3 hours between. Mom may hand express or briefly pump after feedings, but if excellent feeding & milk transfer won't need to do each time. Encourage to continue with some expression in this first week or so, due to multiple risk factors but taper off as volumes increase. With history feeding LPT infants and excellent milk transfer this evening, likely not needed for long.

## 2018-02-12 NOTE — PLAN OF CARE
Problem: Patient Care Overview  Goal: Plan of Care/Patient Progress Review  Outcome: Adequate for Discharge Date Met: 02/12/18  POD3. Aysmptomatic tachycardia, EKG reviewed by Dr Daley this AM. Pt independent with self and baby cares. Discharging to boarder status while infant remains inpatient overnight. Breastfeeding support given by STAS Mendez this shift. Mom encouraged to continue breastfeeding baby on cue with pumping 4-6x/24hours. Light assist to latch deeply and position correctly. Using hydrogel pads to bilateral blistered nipples, states that feeding is not uncomfortable except with initial latch on. Ample colostrum hand expressed. Incisional pain well controlled with tylenol and ibuprofen, up ad curt in room. Given discharge meds. Incision well approximated with steri-strips remaining in situ. Reviewed discharge instructions, will follow up with provider for 6 week post-partum exam.

## 2018-04-05 ENCOUNTER — OFFICE VISIT (OUTPATIENT)
Dept: OBGYN | Facility: CLINIC | Age: 43
End: 2018-04-05
Attending: OBSTETRICS & GYNECOLOGY
Payer: COMMERCIAL

## 2018-04-05 VITALS
WEIGHT: 114 LBS | HEIGHT: 62 IN | HEART RATE: 100 BPM | BODY MASS INDEX: 20.98 KG/M2 | DIASTOLIC BLOOD PRESSURE: 76 MMHG | SYSTOLIC BLOOD PRESSURE: 121 MMHG

## 2018-04-05 PROCEDURE — G0463 HOSPITAL OUTPT CLINIC VISIT: HCPCS | Mod: ZF

## 2018-04-05 ASSESSMENT — PAIN SCALES - GENERAL: PAINLEVEL: NO PAIN (0)

## 2018-04-05 NOTE — MR AVS SNAPSHOT
"              After Visit Summary   4/5/2018    Kitty Ibarra    MRN: [id number]           Patient Information     Date Of Birth          1975        Visit Information        Provider Department      4/5/2018 9:00 AM Omaira Siddiqui MD [facility]        Today's Diagnoses     Postpartum care and examination of lactating mother    -  1       Follow-ups after your visit        Who to contact     Please call your clinic at [phone] to:    Ask questions about your health    Make or cancel appointments    Discuss your medicines    Learn about your test results    Speak to your doctor            Additional Information About Your Visit        MyChart Information     [employer] gives you secure access to your electronic health record. If you see a primary care provider, you can also send messages to your care team and make appointments. If you have questions, please call your primary care clinic.  If you do not have a primary care provider, please call [phone] and they will assist you.      [employer] is an electronic gateway that provides easy, online access to your medical records. With [employer], you can request a clinic appointment, read your test results, renew a prescription or communicate with your care team.     To access your existing account, please contact your [facility] or call [phone] for assistance.        Care EveryWhere ID     This is your Care EveryWhere ID. This could be used by other organizations to access your [place] medical records  [id number]        Your Vitals Were     Pulse Height Last Period Breastfeeding? BMI (Body Mass Index)       100 1.575 m (5' 2\") 03/01/2017 Yes 20.85 kg/m2        Blood Pressure from Last 3 Encounters:   04/05/18 121/76   02/12/18 120/86   02/02/18 115/81    Weight from Last 3 Encounters:   04/05/18 51.7 kg (114 lb)   02/02/18 60.5 kg (133 lb 6.4 oz)   01/24/18 59.8 kg (131 lb 14.4 oz) "              Today, you had the following     No orders found for display         Today's Medication Changes          These changes are accurate as of 4/5/18 11:59 PM.  If you have any questions, ask your nurse or doctor.               Stop taking these medicines if you haven't already. Please contact your care team if you have questions.     ibuprofen 600 MG tablet   Commonly known as:  ADVIL/MOTRIN   Stopped by:  Omaira Siddiqui MD           oxyCODONE IR 5 MG tablet   Commonly known as:  ROXICODONE   Stopped by:  Omaira Siddiqui MD           senna-docusate 8.6-50 MG per tablet   Commonly known as:  SENOKOT-S;PERICOLACE   Stopped by:  Omaira Siddiqui MD                    Primary Care Provider Office Phone # Fax #    Noel Renzo Mayfield [phone] [address] [place] [id number]        Equal Access to Services     [facility]: Hadii jn ku hadasho Soomaali, waaxda luqadaha, qaybta kaalmada adeegyada, delmar rogers haysanjuana sommers . So [facility] [phone].    ATENCIÓN: Si habla español, tiene a lopez disposición servicios gratuitos de asistencia lingüística. Matty al [phone].    We comply with applicable federal civil rights laws and [place] laws. We do not discriminate on the basis of race, color, national origin, age, disability, sex, sexual orientation, or gender identity.            Thank you!     Thank you for choosing [facility]  for your care. Our goal is always to provide you with excellent care. Hearing back from our patients is one way we can continue to improve our services. Please take a few minutes to complete the written survey that you may receive in the mail after your visit with us. Thank you!             Your Updated Medication List - Protect others around you: Learn how to safely use, store and throw away your medicines at www.disposemymeds.org.          This list is accurate as of 4/5/18  11:59 PM.  Always use your most recent med list.                   Brand Name Dispense Instructions for use Diagnosis    acetaminophen 325 MG tablet    TYLENOL    60 tablet    Take 2 tablets (650 mg) by mouth every 4 hours as needed for mild pain    S/P        prenatal multivitamin plus iron 27-0.8 MG Tabs per tablet      Take 1 tablet by mouth daily        Vitamin D (Cholecalciferol) 1000 UNITS Caps

## 2018-04-05 NOTE — PROGRESS NOTES
"    Nursing Notes:   Awa Gaxiola LPN  2018  9:16 AM  Signed  Chief Complaint   Patient presents with     Post Partum Exam      2018   Awa Gaxiola LPN        SUBJECTIVE:   Kitty Ibarra is here for her 6-week postpartum checkup. History of uterine fibroids s/p myomectomy.     PHQ-9 score:   Hx of Abuse:  No    Delivery Date: 2018.    Delivering provider:  Karen Nieves MD.    Type of delivery:  Repeat   Delivery complications: None  Infant gender:  boy, weight 6 pounds 4 oz.  Feeding Method:  .  Complications reported with feeding:  none, infant thriving .    Bleeding:  None.  Duration:  .  Menses resumed:  No  Bowel/Urinary problems:  No    Contraception Planned:  condoms  She  has not had intercourse since delivery..       Overall patient is doing well. She has adequate lactation. Breastfeeds about 10-15 minutes per side and does not need to supplement. Does report her baby has a white covering over his tongue and is wondering if this is from her breast milk vs thrush. Unsure about contraception right now. Does not think she wants to have another pregnancy and is considering vasectomy although does not have anything scheduled. Also is open to Paragard IUD. Mood is stable.       ================================================================  ROS: 10 point ROS neg other than the symptoms noted above in the HPI.     /76  Pulse 100  Ht 1.575 m (5' 2\")  Wt 51.7 kg (114 lb)  LMP 2017  Breastfeeding? Yes  BMI 20.85 kg/m2    General: healthy, alert and no distress  Last PHQ-9 score on record= No Value exists for the : HP#PHQ9  Breasts:  Lactating, Nipples intact with no lesions, Non-tender and No S/S of yeast or mastitis  Abdomen: Benign, Soft, flat, non-tender, No masses, organomegaly, Diastasis less than 1-2 FB, low transverse incisions are well healed  Vulva:  Normal genitalia and Bartholin's, Urethra, [place]'s normal  Vagina:  " normal with good muscle tone and without discharge  Cervix:  no lesions.    Uterus:  fully involuted and non-tender    Adnexa:  Within normal limits and No masses, nodularity, tenderness    ASSESSMENT:   Encounter Diagnosis   Name Primary?     Postpartum care and examination of lactating mother Yes      Normal postpartum exam after repeat c/s 2/2 history of myomectomy  Pregnancy was complicated by:  none.      PLAN:    Discussed calcium intake, vitamins and supplements including Vitamin D  Exercise encouraged  Patient provided pamphlet on Paraguard, if she decides to go forward with this, would recommen waiting 8-12 weeks postpartum for insertion  Follow up in 1 year - next pap due Oct 2019      Scribe Disclosure:   I, Marzena Escalante, MS3, am serving as a scribe; to document services personally performed by Dr. Siddiqui -based on data collection and the provider's statements to me.     Provider Disclosure:  I agree with above History, Review of Systems, Physical exam and Plan.  I have reviewed the content of the documentation and have edited it as needed. I have personally performed the services documented here and the documentation accurately represents those services and the decisions I have made.      Electronically signed by:  Omaira Siddiqui MD, FACOG

## 2018-04-05 NOTE — LETTER
"2018       RE: Kitty Ibarra  [address]  [facility] [address]     Dear Colleague,    Thank you for referring your patient, Kitty Ibarra, to the [facility] at [facility]. Please see a copy of my visit note below.        Nursing Notes:   Awa Gaxiola LPN  2018  9:16 AM  Signed  Chief Complaint   Patient presents with     Post Partum Exam      2018   Awa Gaxiola LPN        SUBJECTIVE:   Kitty Ibarra is here for her 6-week postpartum checkup. History of uterine fibroids s/p myomectomy.     PHQ-9 score:   Hx of Abuse:  No    Delivery Date: 2018.    Delivering provider:  Karen Nieves MD.    Type of delivery:  Repeat   Delivery complications: None  Infant gender:  boy, weight 6 pounds 4 oz.  Feeding Method:  .  Complications reported with feeding:  none, infant thriving .    Bleeding:  None.  Duration:  .  Menses resumed:  No  Bowel/Urinary problems:  No    Contraception Planned:  condoms  She  has not had intercourse since delivery..       Overall patient is doing well. She has adequate lactation. Breastfeeds about 10-15 minutes per side and does not need to supplement. Does report her baby has a white covering over his tongue and is wondering if this is from her breast milk vs thrush. Unsure about contraception right now. Does not think she wants to have another pregnancy and is considering vasectomy although does not have anything scheduled. Also is open to Paragard IUD. Mood is stable.       ================================================================  ROS: 10 point ROS neg other than the symptoms noted above in the HPI.     /76  Pulse 100  Ht 1.575 m (5' 2\")  Wt 51.7 kg (114 lb)  LMP 2017  Breastfeeding? Yes  BMI 20.85 kg/m2    General: healthy, alert and no distress  Last PHQ-9 score on record= No Value exists for the : " #PHQ9  Breasts:  Lactating, Nipples intact with no lesions, Non-tender and No S/S of yeast or mastitis  Abdomen: Benign, Soft, flat, non-tender, No masses, organomegaly, Diastasis less than 1-2 FB, low transverse incisions are well healed  Vulva:  Normal genitalia and Bartholin's, Urethra, [place]'s normal  Vagina:  normal with good muscle tone and without discharge  Cervix:  no lesions.    Uterus:  fully involuted and non-tender    Adnexa:  Within normal limits and No masses, nodularity, tenderness    ASSESSMENT:   Encounter Diagnosis   Name Primary?     Postpartum care and examination of lactating mother Yes      Normal postpartum exam after repeat c/s 2/2 history of myomectomy  Pregnancy was complicated by:  none.      PLAN:    Discussed calcium intake, vitamins and supplements including Vitamin D  Exercise encouraged  Patient provided pamphlet on Paraguard, if she decides to go forward with this, would recommen waiting 8-12 weeks postpartum for insertion  Follow up in 1 year - next pap due Oct 2019      Scribe Disclosure:   I, Marzena Escalante, MS3, am serving as a scribe; to document services personally performed by Dr. Siddiqui -based on data collection and the provider's statements to me.     Provider Disclosure:  I agree with above History, Review of Systems, Physical exam and Plan.  I have reviewed the content of the documentation and have edited it as needed. I have personally performed the services documented here and the documentation accurately represents those services and the decisions I have made.        Again, thank you for allowing me to participate in the care of your patient.      Sincerely,    Omaira Siddiqui MD

## 2018-04-05 NOTE — NURSING NOTE
Chief Complaint   Patient presents with     Post Partum Exam      2018   Awa Gaxiola LPN        SUBJECTIVE:   Kitty Ibarra is here for her 6-week postpartum checkup.     PHQ-9 score:   Hx of Abuse:  No    Delivery Date: 2018.    Delivering provider:  Karen Nieves MD.    Type of delivery:  .     Delivery complications: None  Infant gender:  boy, weight 6 pounds 4 oz.  Feeding Method:  .  Complications reported with feeding:  none, infant thriving .    Bleeding:  None.  Duration:  .  Menses resumed:  No  Bowel/Urinary problems:  No    Contraception Planned:  condoms  She  has not had intercourse since delivery..

## 2019-10-02 ENCOUNTER — HEALTH MAINTENANCE LETTER (OUTPATIENT)
Age: 44
End: 2019-10-02

## 2019-12-16 ENCOUNTER — HEALTH MAINTENANCE LETTER (OUTPATIENT)
Age: 44
End: 2019-12-16

## 2020-07-02 ENCOUNTER — TELEPHONE (OUTPATIENT)
Dept: FAMILY MEDICINE | Facility: CLINIC | Age: 45
End: 2020-07-02

## 2020-07-02 ENCOUNTER — VIRTUAL VISIT (OUTPATIENT)
Dept: FAMILY MEDICINE | Facility: CLINIC | Age: 45
End: 2020-07-02
Payer: COMMERCIAL

## 2020-07-02 VITALS — BODY MASS INDEX: 20.24 KG/M2 | WEIGHT: 110 LBS | HEIGHT: 62 IN

## 2020-07-02 DIAGNOSIS — L03.012 PARONYCHIA OF FINGER, LEFT: Primary | ICD-10-CM

## 2020-07-02 RX ORDER — CEPHALEXIN 500 MG/1
500 CAPSULE ORAL 2 TIMES DAILY
Qty: 20 CAPSULE | Refills: 0 | Status: SHIPPED | OUTPATIENT
Start: 2020-07-02 | End: 2022-06-08

## 2020-07-02 RX ORDER — MUPIROCIN 20 MG/G
OINTMENT TOPICAL 3 TIMES DAILY
Qty: 15 G | Refills: 0 | Status: SHIPPED | OUTPATIENT
Start: 2020-07-02 | End: 2022-06-08

## 2020-07-02 ASSESSMENT — MIFFLIN-ST. JEOR: SCORE: 1097.21

## 2020-07-02 NOTE — TELEPHONE ENCOUNTER
[facility] Call Center    Phone Message    May a detailed message be left on voicemail: yes     Reason for Call: Other: Pt was scheduled for a video appt with Dr. Shetty today at 4:20 for concerns of an infected finger.      Action Taken: Message routed to:  [facility] [place] [facility] [place]: Not Applicable

## 2021-01-15 ENCOUNTER — HEALTH MAINTENANCE LETTER (OUTPATIENT)
Age: 46
End: 2021-01-15

## 2021-01-24 ENCOUNTER — HEALTH MAINTENANCE LETTER (OUTPATIENT)
Age: 46
End: 2021-01-24

## 2021-03-27 ENCOUNTER — MYC MEDICAL ADVICE (OUTPATIENT)
Dept: OBGYN | Facility: CLINIC | Age: 46
End: 2021-03-27

## 2021-04-19 ENCOUNTER — IMMUNIZATION (OUTPATIENT)
Dept: NURSING | Facility: CLINIC | Age: 46
End: 2021-04-19
Payer: COMMERCIAL

## 2021-04-19 PROCEDURE — 91300 PR COVID VAC PFIZER DIL RECON 30 MCG/0.3 ML IM: CPT

## 2021-04-19 PROCEDURE — 0001A PR COVID VAC PFIZER DIL RECON 30 MCG/0.3 ML IM: CPT

## 2021-05-10 ENCOUNTER — IMMUNIZATION (OUTPATIENT)
Dept: NURSING | Facility: CLINIC | Age: 46
End: 2021-05-10
Attending: INTERNAL MEDICINE
Payer: COMMERCIAL

## 2021-05-10 PROCEDURE — 0002A PR COVID VAC PFIZER DIL RECON 30 MCG/0.3 ML IM: CPT

## 2021-05-10 PROCEDURE — 91300 PR COVID VAC PFIZER DIL RECON 30 MCG/0.3 ML IM: CPT

## 2021-06-21 ENCOUNTER — NURSE TRIAGE (OUTPATIENT)
Dept: FAMILY MEDICINE | Facility: CLINIC | Age: 46
End: 2021-06-21

## 2021-06-21 NOTE — TELEPHONE ENCOUNTER
Called and LVM - if patient would still like to speak to a nurse, please call back. OK to transfer this call to clinic.   Lala Galicia RN  06/21/21  1:27 PM

## 2021-06-21 NOTE — TELEPHONE ENCOUNTER
Patient states child bumped into shoulder while playing. She did not hit head, but wonders if being jostled by the blow could cause concussion symptoms. She remembers what happened. Denies vision changes, bruising, confusion, loss of consciousness, vomiting. She states she feels a little foggy and has a very mild headache. She can move her arms and neck around normally and she has not real complaints of pain.     Reason for Disposition    Minor injury or bruise from direct blow    Protocols used: SHOULDER INJURY-A-AH    Home care advice reviewed. Call back if symptoms persist or worsen. All questions answered. Patient verbalized understanding and agrees with this plan.   Lala Galicia RN  06/21/21  1:59 PM

## 2021-06-21 NOTE — TELEPHONE ENCOUNTER
"*Put Sx in reason for call  Symptom: collated with nephew over the weekend. The hit shoulder, did not believe to of hit head. However, afterwards feeling foggy, fatigued, and \"off\".  Additional Comments: Mild headache or head pain that comes and goes.    Meliza      "

## 2021-09-04 ENCOUNTER — HEALTH MAINTENANCE LETTER (OUTPATIENT)
Age: 46
End: 2021-09-04

## 2022-02-17 PROBLEM — O09.91 HIGH-RISK PREGNANCY IN FIRST TRIMESTER: Status: ACTIVE | Noted: 2017-07-27

## 2022-02-19 ENCOUNTER — HEALTH MAINTENANCE LETTER (OUTPATIENT)
Age: 47
End: 2022-02-19

## 2022-03-10 NOTE — PROGRESS NOTES
Family Medicine Video Visit Note  Kitty Ibarra is being evaluated via a billable video visit.    Consent documented below.  Chief Complaint   Patient presents with     Finger     left hand ring finger possible infected, started getting red and irrarted around . Started becoming painful around                  HPI     Video Start Time: 4:19 PM  THIS is the time provider and patient connect.    Kitty Ibarra is a 45 year old female who presents to the clinic for the following health issues:    HPI  Paronychia   - left hand ring finger   - first noticed pain about a week ago  - swelling, redness and pain have slowly gotten worse ove the week  - swelling is slowly spreading up her finger  - doesn't remember any initial trauma, thinks maybe she cut her fingernails but can't remember if it was before or after symptoms started  - she has a two year old at home who is still breastfeeding and she is concerned about the possibility of any medications passing through her breast milk  - she soaked the finger once, but not consistently  - she has been putting neosporin on her finger when she changes the bandaging  - no discoloration underneath the nail  - no systemic symptoms of infection as detailed in ROS below    Patient Active Problem List   Diagnosis     Vitamin D deficiency     High-risk pregnancy in first trimester, , [facility] MD     Previous  times 2     Intramural leiomyoma of uterus     H/O myomectomy     Vitamin D deficiency     AMA 43yo      contractions     S/P      Past Surgical History:   Procedure Laterality Date     BIOPSY BREAST  1991    benign      SECTION  ,       SECTION N/A 2018    Procedure:  SECTION;  Repeat  Section ;  Surgeon: Karen Nieves MD;  Location: [facility]     MYOMECTOMY UTERUS  2006    Uterine fibroids       Social History     Tobacco Use     Smoking status: Never Smoker     Smokeless  "tobacco: Never Used   Substance Use Topics     Alcohol use: No     Family History   Problem Relation Age of Onset     Glaucoma Mother      Colon Cancer Paternal Grandmother      Abdominal Aortic Aneurysm Maternal Grandmother      Alzheimer Disease Maternal Grandfather      Heart Disease Maternal Grandfather      Heart Disease Paternal Grandfather      Stomach Cancer Paternal Grandfather      Family History Negative No family hx of          Current Outpatient Medications   Medication Sig Dispense Refill     acetaminophen (TYLENOL) 325 MG tablet Take 2 tablets (650 mg) by mouth every 4 hours as needed for mild pain 60 tablet 0     cephALEXin (KEFLEX) 500 MG capsule Take 1 capsule (500 mg) by mouth 2 times daily 20 capsule 0     mupirocin (BACTROBAN) 2 % external ointment Apply topically 3 times daily 15 g 0     Prenatal Vit-Fe Fumarate-FA (PRENATAL MULTIVITAMIN  PLUS IRON) 27-0.8 MG TABS per tablet Take 1 tablet by mouth daily       Vitamin D, Cholecalciferol, 1000 UNITS CAPS        Allergies   Allergen Reactions     Nkda [No Known Drug Allergies]        Reviewed and updated as needed this visit by Provider              Review of Systems:     CONSTITUTIONAL: NEGATIVE for fever, chills  INTEGUMENTARY/SKIN: Paronychia at ring finger of left hand as noted above  RESP: NEGATIVE for significant cough or SOB  CV: NEGATIVE for chest pain, palpitations or peripheral edema  GI: NEGATIVE for nausea  MUSCULOSKELETAL: Pain at left ring finger as noted above. Otherwise NEGATIVE for significant arthralgias or myalgia  NEURO: Patient does report \"slight tingling\" at times at left ring finger  PSYCHIATRIC: NEGATIVE for changes in mood or affect         Physical Exam:     Ht 1.575 m (5' 2\")   Wt 49.9 kg (110 lb)   LMP 06/18/2020 (Approximate)   Breastfeeding Yes   BMI 20.12 kg/m      GENERAL: Healthy, alert and no distress  EYES: Eyes grossly normal to inspection, conjunctivae and sclerae normal  RESP: no audible wheeze, cough, or " "visible cyanosis.  No visible retractions or increased work of breathing.  Able to speak fully in complete sentences.  SKIN: left ring finger  - significant erythema and swelling at lateral and proximal nail folds of finger with swelling advanced down to MCP joint  - decrease flexion and DIP and PIP joints with swelling  NEURO: Cranial nerves grossly intact, mentation intact and speech normal  PSYCH: mentation appears normal, affect normal/bright, judgement and insight intact, normal speech and appearance well-groomed        Assessment and Plan     Kitty was seen today for finger.    Diagnoses and all orders for this visit:    Paronychia of finger, left  -     mupirocin (BACTROBAN) 2 % external ointment; Apply topically 3 times daily  -     cephALEXin (KEFLEX) 500 MG capsule; Take 1 capsule (500 mg) by mouth 2 times daily      Advised patient to soak finger in warm water for 15 minutes up to 4 times daily, and then apply mupirocin as ordered above. Considered recommending patient be seen now for in person assessment and possible I&D vs nail avulsion as I have some concern for the spread of swelling and redness up patient's finger, but for now I will start her on an oral ABX as noted with recommendation to seek urgent follow up assessment if swelling continues to spread despite conservative treatment measures and oral ABX. Initial choice of ABX was doxycycline for MRSA coverage, but patient's concern for breast feeding her 2 year old and transmission of medication via breast milk, will treat with cephalex as ordered instead. May need to reconsider this if patient fails to see improvement in symptoms.     Floyd Shetty MD  5:39 PM, July 2, 2020    Video-Visit Details       Video Visit Consent     The patient has been notified of following:     \"This video visit will be conducted via a call between you and your physician/provider. We have found that certain health care needs can be provided without the need for an " "in-person physical exam.  This service lets us provide the care you need with a video conversation.  If a prescription is necessary we can send it directly to your pharmacy.  If lab work is needed we can place an order for that and you can then stop by our lab to have the test done at a later time.    Video visits are billed at different rates depending on your insurance coverage.  Please reach out to your insurance provider with any questions.    If during the course of the call the physician/provider feels a video visit is not appropriate, you will not be charged for this service.\"    Patient has given verbal consent for Video visit? Yes    How would you like to obtain your AVS? MyChart    Will anyone else be joining your video visit? No             Type of service:  Video Visit  Video Start Time: 4:19 PM  THIS is the time provider and patient connect.  Video End Time (time video stopped): 4:38 PM    Originating Location (pt. Location): Home    Distant Location (provider location):  [facility]     Mode of Communication:  Video Conference via [employer]                            " Sski Pregnancy And Lactation Text: This medication is Pregnancy Category D and isn't considered safe during pregnancy. It is excreted in breast milk.

## 2022-05-10 ENCOUNTER — OFFICE VISIT (OUTPATIENT)
Dept: DERMATOLOGY | Facility: CLINIC | Age: 47
End: 2022-05-10
Payer: COMMERCIAL

## 2022-05-10 DIAGNOSIS — Z86.018 HISTORY OF DYSPLASTIC NEVUS: ICD-10-CM

## 2022-05-10 DIAGNOSIS — L82.1 SK (SEBORRHEIC KERATOSIS): ICD-10-CM

## 2022-05-10 DIAGNOSIS — L24.9 IRRITANT HAND DERMATITIS: Primary | ICD-10-CM

## 2022-05-10 PROCEDURE — 99203 OFFICE O/P NEW LOW 30 MIN: CPT | Mod: GC | Performed by: DERMATOLOGY

## 2022-05-10 ASSESSMENT — PAIN SCALES - GENERAL: PAINLEVEL: NO PAIN (0)

## 2022-05-10 NOTE — LETTER
5/10/2022       RE: Kitty Ibarra  [address]  [facility] [address]     Dear Colleague,    Thank you for referring your patient, Kitty Ibarra, to the [facility] [place] [facility]. Please see a copy of my visit note below.    [facility] Dermatology Note  Encounter Date: May 10, 2022  Office Visit     Dermatology Problem List:  # History of dysplastic nevus (no grade specified), L upper arm, s/p biopsy 10/2008  # Irritant contact dermatitis, hands  - Recommend home brianda balm under cotton glove occlusion nightly  # Keratosis pilaris and keratosis pilaris rubra faciei  # Dermatofibroma    ____________________________________________    Assessment & Plan:     # History of dysplastic nevus (no grade specified), L upper arm, s/p biopsy 10/2008  - No evidence of recurrence  - Continue to monitor    # Irritant contact dermatitis, hands  Reports severe dryness from frequent handwashing and significant use of hand . Worse in the winter. Not too itchy or painful. Uses brianda balm occasionally but does not always moisturize after handwashing.   - Discussed gentle and dry skin care  - Recommend brianda balm nightly under cotton glove occlusion nightly     # Benign lesions (cherry angiomas, seborrheic keratoses, lentigines, clinically banal appearing melanocytic nevi)  Particular lesion of concern on left lateral breast (present for at least 4 years) c/w SK  - Reassurance provided and benign nature of condition discussed  - ABCDs of melanoma were discussed and self skin checks were advised  - Signs and symptoms of NMSC discussed  - Sun precaution was advised including the use of sun screens of SPF 30 or higher, sun protective clothing, and avoidance of tanning beds  - Handouts provided     Procedures Performed:   None    Follow-up: 1-2 year(s) in-person, or earlier for new or changing lesions    Staff and  Resident:     Attending: Dr. Preciado staffed the patient.    Resident: Leonel Campoverde MD (PGY-2)  I, Joelle Preciado MD, saw this patient with the resident and agree with the resident s findings and plan of care as documented in the resident s note.      ____________________________________________    CC: Skin Check (States she is concerned about a mole on her left breast)    HPI:  Ms. Kitty Ibarra is a(n) 46 year old female who presents today as a return patient for skin check (last seen 2015).     - Chief complaint is 1 lesion of concern on her left breast. Present for at least 4 years. Possibly getting bigger. Occasionally irritating. No pain, pruritus, bleeding  - On exam of hands, patient reports very dry skin and occasional irritation from frequent hand washing. Worse in winter. Uses brianda balm at home but doesn't always moisturize after hand washing. Not too bothersome, not really itchy or painful.  - Patient is otherwise feeling well, without additional skin concerns.    Labs Reviewed:  N/A    Physical Exam:  Vitals: There were no vitals taken for this visit.  SKIN: Full skin, which includes the head/face, both arms, chest, back, abdomen,both legs, buttocks, digits and/or nails, was examined.  - Left lateral breast with waxy stuck-on brown plaque  - There are dome shaped bright red papules on the trunk and extremities. .   - Multiple regular brown pigmented macules and papules are identified on the  trunk and extremities.   - Scattered brown macules on sun exposed areas.  - There are waxy stuck on tan to brown papules on the  trunk and extremities.  - Bilateral dorsal hands with rough dry scaly cracked plaques  - Previous site of dysplastic nevus on L arm without evidence of recurrence  - No other lesions of concern on areas examined.     Medications:  Current Outpatient Medications   Medication     acetaminophen (TYLENOL) 325 MG tablet     cephALEXin (KEFLEX) 500 MG capsule     mupirocin (BACTROBAN) 2 %  external ointment     Prenatal Vit-Fe Fumarate-FA (PRENATAL MULTIVITAMIN  PLUS IRON) 27-0.8 MG TABS per tablet     Vitamin D, Cholecalciferol, 1000 UNITS CAPS     No current facility-administered medications for this visit.      Past Medical History:   Patient Active Problem List   Diagnosis     Vitamin D deficiency     High-risk pregnancy in first trimester, , [facility] MD     Previous  times 2     Intramural leiomyoma of uterus     H/O myomectomy     Vitamin D deficiency     AMA 41yo      contractions     S/P      Past Medical History:   Diagnosis Date     NO ACTIVE PROBLEMS        CC Referred Self, MD  No address on file on close of this encounter.

## 2022-05-10 NOTE — NURSING NOTE
Dermatology Rooming Note    Kitty Ibarra's goals for this visit include:   Chief Complaint   Patient presents with     Skin Check     States she is concerned about a mole on her left breast     Oxana Schroeder, Visit Facilitator

## 2022-05-10 NOTE — PATIENT INSTRUCTIONS
Patient Education     Checking for Skin Cancer  You can find cancer early by checking your skin each month. There are 3 kinds of skin cancer. They are melanoma, basal cell carcinoma, and squamous cell carcinoma. Doing monthly skin checks is the best way to find new marks or skin changes. Follow the instructions below for checking your skin.   The ABCDEs of checking moles for melanoma   Check your moles or growths for signs of melanoma using ABCDE:   Asymmetry: the sides of the mole or growth don t match  Border: the edges are ragged, notched, or blurred  Color: the color within the mole or growth varies  Diameter: the mole or growth is larger than 6 mm (size of a pencil eraser)  Evolving: the size, shape, or color of the mole or growth is changing (evolving is not shown in the images below)    Checking for other types of skin cancer  Basal cell carcinoma or squamous cell carcinoma have symptoms such as:     A spot or mole that looks different from all other marks on your skin  Changes in how an area feels, such as itching, tenderness, or pain  Changes in the skin's surface, such as oozing, bleeding, or scaliness  A sore that does not heal  New swelling or redness beyond the border of a mole    Who s at risk?  Anyone can get skin cancer. But you are at greater risk if you have:   Fair skin, light-colored hair, or light-colored eyes  Many moles or abnormal moles on your skin  A history of sunburns from sunlight or tanning beds  A family history of skin cancer  A history of exposure to radiation or chemicals  A weakened immune system  If you have had skin cancer in the past, you are at risk for recurring skin cancer.   How to check your skin  Do your monthly skin checkups in front of a full-length mirror. Check all parts of your body, including your:   Head (ears, face, neck, and scalp)  Torso (front, back, and sides)  Arms (tops, undersides, upper, and lower armpits)  Hands (palms, backs, and fingers, including  Addended by: APRIL SUAZO on: 9/7/2018 12:22 PM     Modules accepted: Orders     under the nails)  Buttocks and genitals  Legs (front, back, and sides)  Feet (tops, soles, toes, including under the nails, and between toes)  If you have a lot of moles, take digital photos of them each month. Make sure to take photos both up close and from a distance. These can help you see if any moles change over time.   Most skin changes are not cancer. But if you see any changes in your skin, call your doctor right away. Only he or she can diagnose a problem. If you have skin cancer, seeing your doctor can be the first step toward getting the treatment that could save your life.   [employer] last reviewed this educational content on 4/1/2019 2000-2020 [employer] [address] [place] [address], [place] [address]. All rights reserved. This information is not intended as a substitute for professional medical care. Always follow your healthcare professional's instructions.       When should I call my doctor?  If you are worsening or not improving, please, contact us or seek urgent care as noted below.     Who should I call with questions (adults)?  [facility] (adult and pediatric): [phone]  [facility] (adult): [phone]  For urgent needs outside of business hours call the [facility] at [phone] and ask for the dermatology resident on call to be paged  If this is a medical emergency and you are unable to reach an ER, Call [phone]    Who should I call with questions (pediatric)?  [facility]- Pediatric Dermatology  Dr. Jennifer Ferrer, Dr. Braeden Mccall, Dr. Susie Stafford, LAUREN Douglas, Dr. Margaret Hodge, Dr. Eulalia Low & Dr. Hubert Astudillo  Non-urgent nurse triage line; [phone]- Nathalie and Mini MONTANO Care Coordinatorgulshan Oquendo (/Complex ) [phone]    If you need a prescription refill, please contact your pharmacy. Refills are approved or denied by our  Physicians during normal business hours, Monday through Fridays  Per office policy, refills will not be granted if you have not been seen within the past year (or sooner depending on your child's condition)    Scheduling Information:  Pediatric Appointment Scheduling and Call Center [phone]  Radiology Scheduling- [phone]  Sedation Unit Scheduling- [phone]  [facility] Scheduling- General [phone]; Pediatric Dermatology [phone]  Main  Services: [phone]  Yakut: [phone]  Ivorian: [phone]  Hmong/Jamaican/Tamazight: [phone]  Preadmission Nursing Department Fax Number: [phone] (Fax all pre-operative paperwork to this number)    For urgent matters arising during evenings, weekends, or holidays that cannot wait for normal business hours please call [phone] and ask for the dermatology resident on call to be paged.

## 2022-05-10 NOTE — PROGRESS NOTES
[facility] Dermatology Note  Encounter Date: May 10, 2022  Office Visit     Dermatology Problem List:  # History of dysplastic nevus (no grade specified), L upper arm, s/p biopsy 10/2008  # Irritant contact dermatitis, hands  - Recommend home brianda balm under cotton glove occlusion nightly  # Keratosis pilaris and keratosis pilaris rubra faciei  # Dermatofibroma    ____________________________________________    Assessment & Plan:     # History of dysplastic nevus (no grade specified), L upper arm, s/p biopsy 10/2008  - No evidence of recurrence  - Continue to monitor    # Irritant contact dermatitis, hands  Reports severe dryness from frequent handwashing and significant use of hand . Worse in the winter. Not too itchy or painful. Uses brianda balm occasionally but does not always moisturize after handwashing.   - Discussed gentle and dry skin care  - Recommend brianda balm nightly under cotton glove occlusion nightly     # Benign lesions (cherry angiomas, seborrheic keratoses, lentigines, clinically banal appearing melanocytic nevi)  Particular lesion of concern on left lateral breast (present for at least 4 years) c/w SK  - Reassurance provided and benign nature of condition discussed  - ABCDs of melanoma were discussed and self skin checks were advised  - Signs and symptoms of NMSC discussed  - Sun precaution was advised including the use of sun screens of SPF 30 or higher, sun protective clothing, and avoidance of tanning beds  - Handouts provided     Procedures Performed:   None    Follow-up: 1-2 year(s) in-person, or earlier for new or changing lesions    Staff and Resident:     Attending: Dr. Preciado staffed the patient.    Resident: Leonel Campoverde MD (PGY-2)  I, Joelle Preciado MD, saw this patient with the resident and agree with the resident s findings and plan of care as documented in the resident s note.      ____________________________________________    CC: Skin Check  (States she is concerned about a mole on her left breast)    HPI:  Ms. Kitty Ibarra is a(n) 46 year old female who presents today as a return patient for skin check (last seen 2015).     - Chief complaint is 1 lesion of concern on her left breast. Present for at least 4 years. Possibly getting bigger. Occasionally irritating. No pain, pruritus, bleeding  - On exam of hands, patient reports very dry skin and occasional irritation from frequent hand washing. Worse in winter. Uses brianda balm at home but doesn't always moisturize after hand washing. Not too bothersome, not really itchy or painful.  - Patient is otherwise feeling well, without additional skin concerns.    Labs Reviewed:  N/A    Physical Exam:  Vitals: There were no vitals taken for this visit.  SKIN: Full skin, which includes the head/face, both arms, chest, back, abdomen,both legs, buttocks, digits and/or nails, was examined.  - Left lateral breast with waxy stuck-on brown plaque  - There are dome shaped bright red papules on the trunk and extremities. .   - Multiple regular brown pigmented macules and papules are identified on the  trunk and extremities.   - Scattered brown macules on sun exposed areas.  - There are waxy stuck on tan to brown papules on the  trunk and extremities.  - Bilateral dorsal hands with rough dry scaly cracked plaques  - Previous site of dysplastic nevus on L arm without evidence of recurrence  - No other lesions of concern on areas examined.     Medications:  Current Outpatient Medications   Medication     acetaminophen (TYLENOL) 325 MG tablet     cephALEXin (KEFLEX) 500 MG capsule     mupirocin (BACTROBAN) 2 % external ointment     Prenatal Vit-Fe Fumarate-FA (PRENATAL MULTIVITAMIN  PLUS IRON) 27-0.8 MG TABS per tablet     Vitamin D, Cholecalciferol, 1000 UNITS CAPS     No current facility-administered medications for this visit.      Past Medical History:   Patient Active Problem List   Diagnosis     Vitamin D deficiency      High-risk pregnancy in first trimester, , [facility] MD     Previous  times 2     Intramural leiomyoma of uterus     H/O myomectomy     Vitamin D deficiency     AMA 41yo      contractions     S/P      Past Medical History:   Diagnosis Date     NO ACTIVE PROBLEMS        CC Referred Self, MD  No address on file on close of this encounter.

## 2022-06-04 ENCOUNTER — NURSE TRIAGE (OUTPATIENT)
Dept: NURSING | Facility: CLINIC | Age: 47
End: 2022-06-04
Payer: COMMERCIAL

## 2022-06-04 NOTE — TELEPHONE ENCOUNTER
Triage Call:    Patient tested positive to covid via home test.  She was caring for her 4 year old who has covid this week.  She reports onset of symptoms today.  She reports fever, sore throat, runny nose, headache, mild cough, and fatigue.  Oral temperature 100.3.  She denies high risk for covid complications.  She denies chest pain/pressure, difficulty breathing, confusion, or weakness.      According to the protocol, patient should continue home care.  Care advice given including when to call back. Patient verbalizes understanding and agrees with plan of care.     Josey Bartlett RN  06/04/22 6:15 PM  [facility] Nurse Advisor    COVID 19 Nurse Triage Plan/Patient Instructions    Please be aware that novel coronavirus (COVID-19) may be circulating in the community. If you develop symptoms such as fever, cough, or SOB or if you have concerns about the presence of another infection including coronavirus (COVID-19), please contact your health care provider or visit [email or web address].     Disposition/Instructions    Home care recommended. Follow home care protocol based instructions.    Thank you for taking steps to prevent the spread of this virus.  o Limit your contact with others.  o Wear a simple mask to cover your cough.  o Wash your hands well and often.    Resources    [facility]: About COVID-19: [email or web address]/    CDC: What to Do If You're Sick: www.cdc.gov/coronavirus/2019-ncov/about/steps-when-sick.html    CDC: Ending Home Isolation: www.cdc.gov/coronavirus/2019-ncov/hcp/disposition-in-home-patients.html     CDC: Caring for Someone: www.cdc.gov/coronavirus/2019-ncov/if-you-are-sick/care-for-someone.html     [facility]: Interim Guidance for Hospital Discharge to Home: www.health.[facility].[place].us/diseases/coronavirus/hcp/hospdischarge.pdf    [facility] clinical trials (COVID-19 research studies): clinicalaffairs.[facility]/n-clinical-trials     Below are the  COVID-19 hotlines at the [place] Department of [facility] ([facility]). Interpreters are available.   o For health questions: Call 597-913-5282 or 1-911.234.5688 (7 a.m. to 7 p.m.)  o For questions about schools and childcare: Call 379-533-8815 or 1-192.933.4904 (7 a.m. to 7 p.m.)         Reason for Disposition    [1] COVID-19 diagnosed by positive lab test (e.g., PCR, rapid self-test kit) AND [2] mild symptoms (e.g., cough, fever, others) AND [3] no complications or SOB    Additional Information    Negative: SEVERE difficulty breathing (e.g., struggling for each breath, speaks in single words)    Negative: Difficult to awaken or acting confused (e.g., disoriented, slurred speech)    Negative: Bluish (or gray) lips or face now    Negative: Shock suspected (e.g., cold/pale/clammy skin, too weak to stand, low BP, rapid pulse)    Negative: Sounds like a life-threatening emergency to the triager    Negative: [1] Diagnosed or suspected COVID-19 AND [2] symptoms lasting 3 or more weeks    Negative: [1] COVID-19 exposure AND [2] no symptoms    Negative: COVID-19 vaccine reaction suspected (e.g., fever, headache, muscle aches) occurring 1 to 3 days after getting vaccine    Negative: COVID-19 vaccine, questions about    Negative: [1] Lives with someone known to have influenza (flu test positive) AND [2] flu-like symptoms (e.g., cough, runny nose, sore throat, SOB; with or without fever)    Negative: [1] Adult with possible COVID-19 symptoms AND [2] triager concerned about severity of symptoms or other causes    Negative: COVID-19 and breastfeeding, questions about    Negative: SEVERE or constant chest pain or pressure  (Exception: Mild central chest pain, present only when coughing.)    Negative: MODERATE difficulty breathing (e.g., speaks in phrases, SOB even at rest, pulse 100-120)    Negative: [1] Headache AND [2] stiff neck (can't touch chin to chest)    Negative: Oxygen level (e.g., pulse oximetry) 90 percent or lower     Negative: Chest pain or pressure    Negative: Patient sounds very sick or weak to the triager    Negative: MILD difficulty breathing (e.g., minimal/no SOB at rest, SOB with walking, pulse <100)    Negative: Fever > 103 F (39.4 C)    Negative: [1] Fever > 101 F (38.3 C) AND [2] age > 60 years    Negative: [1] Fever > 100.0 F (37.8 C) AND [2] bedridden (e.g., nursing home patient, CVA, chronic illness, recovering from surgery)    Negative: HIGH RISK for severe COVID complications (e.g., weak immune system, age > 64 years, obesity with BMI > 25, pregnant, chronic lung disease or other chronic medical condition)  (Exception: Already seen by PCP and no new or worsening symptoms.)    Negative: [1] HIGH RISK patient AND [2] influenza is widespread in the community AND [3] ONE OR MORE respiratory symptoms: cough, sore throat, runny or stuffy nose    Negative: [1] HIGH RISK patient AND [2] influenza exposure within the last 7 days AND [3] ONE OR MORE respiratory symptoms: cough, sore throat, runny or stuffy nose    Negative: Oxygen level (e.g., pulse oximetry) 91 to 94 percent    Negative: Fever present > 3 days (72 hours)    Negative: [1] Fever returns after gone for over 24 hours AND [2] symptoms worse or not improved    Negative: [1] Continuous (nonstop) coughing interferes with work or school AND [2] no improvement using cough treatment per Care Advice    Negative: [1] COVID-19 infection suspected by caller or triager AND [2] mild symptoms (cough, fever, or others) AND [3] negative COVID-19 rapid test    Negative: Cough present > 3 weeks    Negative: [1] COVID-19 diagnosed by positive lab test (e.g., PCR, rapid self-test kit) AND [2] NO symptoms (e.g., cough, fever, others)    Protocols used: CORONAVIRUS (COVID-19) DIAGNOSED OR [id number] 1.18.2022

## 2022-06-06 ENCOUNTER — TELEPHONE (OUTPATIENT)
Dept: FAMILY MEDICINE | Facility: CLINIC | Age: 47
End: 2022-06-06

## 2022-06-06 NOTE — TELEPHONE ENCOUNTER
Who is calling? Patient  Reason for Call:Covid concerns after testing positive - see triage encounter

## 2022-06-08 ENCOUNTER — NURSE TRIAGE (OUTPATIENT)
Dept: FAMILY MEDICINE | Facility: CLINIC | Age: 47
End: 2022-06-08

## 2022-06-08 NOTE — TELEPHONE ENCOUNTER
[employer] message sent to Kitty to call back to clinic after missing her call.  I attempted to call her back and she did not answer and has no voicemail.  BRANDEN SpiveyN, RN, [facility]  06/08/22  1:45 PM

## 2022-06-08 NOTE — TELEPHONE ENCOUNTER
[employer] message sent to Kitty to call to discuss positive COVID-19 test.  BRANDEN SpiveyN, RN, [facility]  06/08/22  11:54 AM

## 2022-06-08 NOTE — TELEPHONE ENCOUNTER
Reason for Disposition    [1] COVID-19 diagnosed by positive lab test (e.g., PCR, rapid self-test kit) AND [2] mild symptoms (e.g., cough, fever, others) AND [3] no complications or SOB    Answer Assessment - Initial Assessment Questions  1. COVID-19 DIAGNOSIS: Positive home COVID-19 antigen test 6/3/22  2. COVID-19 EXPOSURE: 4-year old who was positive 6/1/22 from day care  3. ONSET: Friday, 6/2/22  4. WORST SYMPTOM: Cough  5. COUGH: Dry cough  6. FEVER:  101  7. RESPIRATORY STATUS: Breathing is normal  8. BETTER-SAME-WORSE: Better, congestion is improved and fever gone  9. HIGH RISK DISEASE: None  10. VACCINE: Pfizer 4/19/21, 5/10/21  11. BOOSTER: Pfizer #1 11/23/21  12. PREGNANCY: No  13. OTHER SYMPTOMS: headache, scratchy-itchy throat, fatigue, night sweats, and tingling in hands, feet and tip of her chin  14. O2 SATURATION MONITOR:   98%    Protocols used: CORONAVIRUS (COVID-19) DIAGNOSED OR [id number] 1.18.2022    Discussed with Kitty how to care for her ongoing dry cough.  The remainder of her COVID symptoms have resolved.  She is stressed as she is quarantined in their basement with their three youngest children who are also positive and her spouse and two older children and her parents are all upstairs.  She admits that the tingling sensation in her hands, feet and chin may be related to stress and anxiety.  Reviewed relaxation techniques and deep breathing and asked her to focus on these when she feels the tingling coming on.  If the symptoms do not resolve and she would like to see Dr. Shetty to investigate them further, I asked her to contact the schedulers to make an appointment.  She agreed to do this.  If she has any further questions or concerns, she will contact the clinic.  KRISTEN Spivey, RN, [facility]  06/08/22  2:08 PM

## 2022-08-22 NOTE — PROGRESS NOTES
Doing well. No contractions. Reviewed c/s expectations and TAPS blocks.   Karen Nieves   Xolair Counseling:  Patient informed of potential adverse effects including but not limited to fever, muscle aches, rash and allergic reactions.  The patient verbalized understanding of the proper use and possible adverse effects of Xolair.  All of the patient's questions and concerns were addressed.

## 2022-10-22 ENCOUNTER — HEALTH MAINTENANCE LETTER (OUTPATIENT)
Age: 47
End: 2022-10-22

## 2022-11-03 ENCOUNTER — TRANSFERRED RECORDS (OUTPATIENT)
Dept: HEALTH INFORMATION MANAGEMENT | Facility: CLINIC | Age: 47
End: 2022-11-03

## 2023-01-23 ENCOUNTER — TELEPHONE (OUTPATIENT)
Dept: DERMATOLOGY | Facility: CLINIC | Age: 48
End: 2023-01-23
Payer: COMMERCIAL

## 2023-04-01 ENCOUNTER — HEALTH MAINTENANCE LETTER (OUTPATIENT)
Age: 48
End: 2023-04-01

## 2023-05-08 NOTE — LETTER
2017       RE: Kitty Ibarra  [address]  [place] [id number]     Dear Colleague,    Thank you for referring your patient, Kitty Ibarra, to the [facility]. Please see a copy of my visit note below.    S:  Doing well, feeing much better than earlier in the pregnancy.  No contractions, feeling FM.    O: see flow    A:  41 y/o  at 22+5 weeks, doing well.  Pregnancy complicated by AMA, history of myomectomy with multiple fibroids currently, history of 2 late pre-term  deliveries 2/2 previous myomectomy    P:  Flu shot today.  Schedule follow up growth u/s at 24 weeks-plan q4 week assessment after that.  RTC 4 weeks.     Omaira Siddiqui MD, FACOG      Again, thank you for allowing me to participate in the care of your patient.      Sincerely,    Omaira Siddiqui MD       No

## 2023-05-16 ENCOUNTER — OFFICE VISIT (OUTPATIENT)
Dept: OBGYN | Facility: CLINIC | Age: 48
End: 2023-05-16
Attending: OBSTETRICS & GYNECOLOGY
Payer: COMMERCIAL

## 2023-05-16 VITALS
HEART RATE: 116 BPM | BODY MASS INDEX: 19.75 KG/M2 | WEIGHT: 107.3 LBS | HEIGHT: 62 IN | SYSTOLIC BLOOD PRESSURE: 123 MMHG | DIASTOLIC BLOOD PRESSURE: 86 MMHG

## 2023-05-16 DIAGNOSIS — Z00.00 VISIT FOR PREVENTIVE HEALTH EXAMINATION: Primary | ICD-10-CM

## 2023-05-16 DIAGNOSIS — D25.1 INTRAMURAL LEIOMYOMA OF UTERUS: ICD-10-CM

## 2023-05-16 DIAGNOSIS — Z13.29 SCREENING FOR THYROID DISORDER: ICD-10-CM

## 2023-05-16 DIAGNOSIS — Z13.1 SCREENING FOR DIABETES MELLITUS: ICD-10-CM

## 2023-05-16 DIAGNOSIS — Z13.220 SCREENING FOR LIPOID DISORDERS: ICD-10-CM

## 2023-05-16 DIAGNOSIS — Z12.31 ENCOUNTER FOR SCREENING MAMMOGRAM FOR MALIGNANT NEOPLASM OF BREAST: ICD-10-CM

## 2023-05-16 PROCEDURE — 99213 OFFICE O/P EST LOW 20 MIN: CPT | Performed by: OBSTETRICS & GYNECOLOGY

## 2023-05-16 PROCEDURE — 99386 PREV VISIT NEW AGE 40-64: CPT | Mod: GC | Performed by: OBSTETRICS & GYNECOLOGY

## 2023-05-16 PROCEDURE — 87624 HPV HI-RISK TYP POOLED RSLT: CPT | Performed by: OBSTETRICS & GYNECOLOGY

## 2023-05-16 PROCEDURE — G0145 SCR C/V CYTO,THINLAYER,RESCR: HCPCS | Performed by: OBSTETRICS & GYNECOLOGY

## 2023-05-16 ASSESSMENT — ENCOUNTER SYMPTOMS
ALTERED TEMPERATURE REGULATION: 0
SNORES LOUDLY: 1
CHILLS: 0
SORE THROAT: 0
SPUTUM PRODUCTION: 0
HALLUCINATIONS: 0
WEIGHT LOSS: 0
POLYDIPSIA: 0
NECK MASS: 0
TASTE DISTURBANCE: 0
POSTURAL DYSPNEA: 0
WEIGHT GAIN: 0
DECREASED APPETITE: 0
SINUS PAIN: 0
INCREASED ENERGY: 1
SINUS CONGESTION: 0
SMELL DISTURBANCE: 0
NIGHT SWEATS: 0
FEVER: 0
COUGH DISTURBING SLEEP: 0
WHEEZING: 0
HOARSE VOICE: 0
DYSPNEA ON EXERTION: 0
TROUBLE SWALLOWING: 0
SHORTNESS OF BREATH: 0
HEMOPTYSIS: 0
FATIGUE: 1
COUGH: 0
POLYPHAGIA: 0

## 2023-05-16 ASSESSMENT — ANXIETY QUESTIONNAIRES
2. NOT BEING ABLE TO STOP OR CONTROL WORRYING: NOT AT ALL
4. TROUBLE RELAXING: NOT AT ALL
7. FEELING AFRAID AS IF SOMETHING AWFUL MIGHT HAPPEN: NOT AT ALL
3. WORRYING TOO MUCH ABOUT DIFFERENT THINGS: NOT AT ALL
8. IF YOU CHECKED OFF ANY PROBLEMS, HOW DIFFICULT HAVE THESE MADE IT FOR YOU TO DO YOUR WORK, TAKE CARE OF THINGS AT HOME, OR GET ALONG WITH OTHER PEOPLE?: NOT DIFFICULT AT ALL
1. FEELING NERVOUS, ANXIOUS, OR ON EDGE: NOT AT ALL
7. FEELING AFRAID AS IF SOMETHING AWFUL MIGHT HAPPEN: NOT AT ALL
6. BECOMING EASILY ANNOYED OR IRRITABLE: SEVERAL DAYS
5. BEING SO RESTLESS THAT IT IS HARD TO SIT STILL: NOT AT ALL
IF YOU CHECKED OFF ANY PROBLEMS ON THIS QUESTIONNAIRE, HOW DIFFICULT HAVE THESE PROBLEMS MADE IT FOR YOU TO DO YOUR WORK, TAKE CARE OF THINGS AT HOME, OR GET ALONG WITH OTHER PEOPLE: NOT DIFFICULT AT ALL
GAD7 TOTAL SCORE: 1
GAD7 TOTAL SCORE: 1

## 2023-05-16 NOTE — PROGRESS NOTES
[facility]  [facility]  Annual Gynecology Visit      SUBJECTIVE   Kitty Ibarra is a 47 year old , LMP 5/, here for an annual preventive exam. Patient doing well overall.  Just in need of updating preventative care. Still breastfeeding her youngest child but hoping to be done later this year.     Specific concerns today:   Notes that she's been having some episodic LLQ tenderness for the past month. States it's not painful and has not used anything to help manage it. She notes her symptoms started after an episode of gastroenteritis. She denies any associating factors. Her symptoms are not associated with her menstrual cycles.   States her cycles are regular (5 days q monthly). Denies dysmenorrhea or menorrhagia. She does have a history of fibroids and endorses mild bloating during menses. But they have not bothered her for years.      Gynecologic History  No LMP recorded.   Menstrual History:      2017     2:32 PM 2017    10:00 AM 2020     4:20 PM   Menstrual History   LAST MENSTRUAL PERIOD  3/1/2017 2020   Menarche Age 15 years     Period Cycle (Days) 30-31 days     Period Duration (Days) 3 days     Method of Contraception Condoms     Period Pattern Regular     Menstrual Flow Light     Menstrual Control Thin pad;Tampon     Dysmenorrhea None     Reviewed Today Yes     Comments without BC       Current contraception: Condoms  Desires pregnancy within 12 months: No  Number of partners in last year: 1    Denies history of STI  Lab Results   Component Value Date    PAP NIL 10/13/2014      History of abnormal Pap smear: No  HPV vaccine none     Obstetric History  OB History    Para Term  AB Living   3 3 0 3 0 3   SAB IAB Ectopic Multiple Live Births   0 0 0 0 3      # Outcome Date GA Lbr Hunter/2nd Weight Sex Delivery Anes PTL Lv   3  18 36w2d  2.84 kg (6 lb 4.2 oz) M CS-LTranv Spinal N TONY      Name:  SANGA,BABY1 BUZZ      Apgar1: 4  Apgar5: 9   2   36w0d  2.948 kg (6 lb 8 oz) M CS-LTranv   TONY      Birth Comments: Scheduled C/s due to prior C/s and myomectomy      Name: Keegan   1   36w0d  2.523 kg (5 lb 9 oz) M CS-LTranv   TONY      Birth Comments: Scheduled C/s due to prior myomectomy      Complications: Placenta previa      Name: Daniele      Obstetric Comments   No GDM, HTN, PPH.         Health Maintenance  Immunization History   Administered Date(s) Administered     COVID-19 Bivalent 12+ (Pfizer) 2022     COVID-19 MONOVALENT 12+ (Pfizer) 2021, 05/10/2021     HEPA 1996, 1997     HepB 1996, 1996, 1997     Influenza (H1N1) 2009     Influenza (IIV3) PF 10/25/2012, 10/16/2013, 2013, 2015     Influenza Vaccine >6 months (Alfuria,Fluzone) 2017     Rabavert 1996, 1996, 1996     TD,PF 7+ (Tenivac) 2014     TDAP (Adacel,Boostrix) 2006     TDAP Vaccine (Boostrix) 2017     Typhoid IM 2005, 2014     Yellow Fever 1996, 2014     Health Maintenance   Topic Date Due     ADVANCE CARE PLANNING  Never done     MAMMO SCREENING  Never done     COLORECTAL CANCER SCREENING  Never done     HEPATITIS C SCREENING  Never done     PAP  10/13/2017     YEARLY PREVENTIVE VISIT  2018     LIPID  2020     DTAP/TDAP/TD IMMUNIZATION (4 - Td or Tdap) 2027     HIV SCREENING  Completed     PHQ-2 (once per calendar year)  Completed     INFLUENZA VACCINE  Completed     HEPATITIS B IMMUNIZATION  Completed     COVID-19 Vaccine  Completed     Pneumococcal Vaccine: Pediatrics (0 to 5 Years) and At-Risk Patients (6 to 64 Years)  Aged Out     IPV IMMUNIZATION  Aged Out     MENINGITIS IMMUNIZATION  Aged Out     Lab Results   Component Value Date    CHOL 170.0 2015     Lab Results   Component Value Date    HDL 58.0 2015     Lab Results   Component Value Date    .0 2015      10/25/2012     Lab Results   Component Value Date    TSH 1.28 2017       Colonoscopy - due now, h/o paternal grandmother with colon cancer    Mammogram - due now, h/e patient still breastfeeding      Past Medical History  Past Medical History:   Diagnosis Date     NO ACTIVE PROBLEMS        Past Surgical History  Past Surgical History:   Procedure Laterality Date     BIOPSY BREAST  1991    benign      SECTION  ,       SECTION N/A 2018    Procedure:  SECTION;  Repeat  Section ;  Surgeon: Karen Nieves MD;  [facility]     MYOMECTOMY UTERUS  2006    Uterine fibroids       Medications  No current outpatient medications on file.     No current facility-administered medications for this visit.       Allergies     Allergies   Allergen Reactions     Nkda [No Known Drug Allergy]        Social History  Social History     Socioeconomic History     Marital status:      Spouse name: Ender     Number of children: 2     Years of education: Not on file     Highest education level: Not on file   Occupational History     Occupation:      Employer: UNKNOWN     Comment: law firm-pt time   Tobacco Use     Smoking status: Never     Smokeless tobacco: Never   Vaping Use     Vaping status: Not on file   Substance and Sexual Activity     Alcohol use: No     Drug use: No     Sexual activity: Yes     Partners: Male     Birth control/protection: Condom   Other Topics Concern      Service No     Blood Transfusions No     Caffeine Concern No     Occupational Exposure No     Hobby Hazards No     Sleep Concern No     Stress Concern Yes     Weight Concern Yes     Special Diet No     Back Care No     Exercise Yes     Bike Helmet No     Seat Belt No     Self-Exams No     Parent/sibling w/ CABG, MI or angioplasty before 65F 55M? Not Asked   Social History Narrative    How much exercise per week? Yoga regularly    How much calcium per day?Food     "   How much caffeine per day? none    How much vitamin D per day? prenatal    Do you/your family wear seatbelts?  Yes    Do you/your family use safety helmets? Yes    Do you/your family use sunscreen? Yes    Do you/your family keep firearms in the home? No    Do you/your family have a smoke detector(s)? Yes        Debbieyrn Amado, CMA 17         Social Determinants of Health     Financial Resource Strain: Not on file   Food Insecurity: Not on file   Transportation Needs: Not on file   Physical Activity: Not on file   Stress: Not on file   Social Connections: Not on file   Intimate Partner Violence: Not on file   Housing Stability: Not on file       Family History  Family History   Problem Relation Age of Onset     Glaucoma Mother      Colon Cancer Paternal Grandmother      Abdominal Aortic Aneurysm Maternal Grandmother      Alzheimer Disease Maternal Grandfather      Heart Disease Maternal Grandfather      Heart Disease Paternal Grandfather      Stomach Cancer Paternal Grandfather      Family History Negative No family hx of      No family history of breast, uterine, ovarian or colon cancer.    Review of Systems  ROS negative unless stated in HPI    OBJECTIVE   Ht 1.575 m (5' 2\")   BMI 20.12 kg/m    Gen: NAD, resting comfortably  CV: well perfused, no LE edema  RR: normal respiratory rate and effort  Abd: soft, non-tender, non-distended     Pelvic:  Normal appearing external female genitalia. Normal hair distribution. Vagina is without lesions. No vaginal discharge. Cervix multiparous, no lesions, no cervical motion tenderness. Uterus is enlarged, about 14 week size and globular with a right sided fibroid palpable, about 4 x 4 cm.    ASSESSMENT   Kitty Ibarra is a 47 year old , annual preventive exam within normal limits.  Known h/o fibroids with prior myomectomy and caesarean deliveries due to that history.      PLAN     # LLQ tenderness   # H/o Uterine fibroids   - Low suspicion for " gynecological etiology for LLQ tenderness. However patient with known history of uterine fibroids that are palpable on exam.  Discussed concerning s/sxs of uterine fibroids. Patient has not had pelvic US since pregnancy. Will plan to get repeat US to re-evaluate fibroids, however discussed no need for further intervention at this time given patient is asymptomatic. Discussed that fibroids will shrink somewhat with menopause and won't likely cause symptoms if she is not having any at this time.  If having significant symptoms before than can reconsider medical or surgical interventions.     # Annual visit   - Pap smear collected today. Results pending   - Mammogram ordered. Discussed with patient to wait 4 months s/p breast feeding before completing.   - Colonoscopy ordered.   - Labs: hbA1c, lipids, and thyroid ordered. Will follow up results   - Immunizations: UTD  - Recommended 1655-7860 U Vitamin D daily    RTC in one year for annual exam or with concerns.     Staffed with Dr. Artur Damico MD, MPH, MS (PGY1)   Obstetrics, Gynecology & Women's Health   Resident, PGY-1  05/16/2023 2:00 PM     The patient was seen and examined with Dr. Damico.  I have reviewed and edited the above note.     Omaira Siddiqui MD, FACOG

## 2023-05-16 NOTE — PATIENT INSTRUCTIONS
Thank you for trusting us with your care!     If you need to contact us for questions about:  Symptoms, Scheduling & Medical Questions; Non-urgent (2-3 day response) Tylorleslie message, Urgent (needing response today) [phone] (if after 3:30pm next day response)   Prescriptions: Please call your Pharmacy   Billing: [facility] [phone] or [facility]:[phone]    PREVENTIVE HEALTH RECOMMENDATIONS:   Most women need a yearly breast and pelvic exam.    A PAP screen, a test done DURING a pelvic exam, is NO longer recommended yearly.    March 2013, screening guidelines recommended by ACOG for PAP screen are:    1) First pap at age 21.    2) Pap every 3 years until age 30.    3) After age 30, pap every 3 years or Pap with HR HPV screen every 5 years until age 65.  4) Women do NOT need a vaginal Pap screen after a hysterectomy (surgical removal of the uterus) when they have not had cancer.    Exceptions:  1) Yearly pap if HIV+ or immunosuppressed secondary to organ transplant  2) MARCIE II-III continue routine screening for 20 years.    I encourage you continue looking for opportunities to choose a healthy lifestyle:       * Choose to eat a heart healthy diet. Check out the FOOD PLATE guidelines at: http://www.choosemyplate.gov/ for helpful hints on weight and cholesterol management.  Balance your caloric intake with exercise to maintain a BMI in the 22 to 26 range. For bone health: Eat calcium-rich foods like yogurt, broccoli or take chewable calcium pills (500 to 600 mg) twice a day with food.       * Exercise for at least an average of 30 minutes a day, 5 days of the week. This will help you control your weight, release stress, and help prevent disease.      * Take a Vitamin D3 supplement daily fall through spring and during summer unless you pxli35-40' full body sun exposure to skin without sunscreen.      * DO wear sunscreen to prevent skin cancer after the first 15-30 minutes.      * Identify stressors in your  life, find ways to release the stress, and, make time for yourself. PLEASE ask for help if mood changes last longer than two weeks.     * Limit alcohol to one drink per day.  No smoking.  Avoid second hand smoke. If you smoke, ask for help to stop.       *  If you are in a sexual relationship, talk with your partner about possible infection risks and take action to protect yourself from exposure to a sexual infection.    Please request an infection screen for STIs (sexually transmitted infections) if you are less than age 26 OR believe that you may be at risk.     Get a flu shot each year. Get a tetanus shot every 10 years. EVERYONE needs a pertussis (Whooping cough) booster.    See your dentist twice a year for an exam and preventive care cleaning.     Consider the following screen tests:    1) cholesterol test every 5 years.     2) yearly mammogram after age 40 unless you have identified risks.    3) colonoscopy every 10 years after age 50 unless you have identified risks.    4) diabetes blood test screening if you are at risk for diabetes.      Additional information that you may also find helpful:  The Internet now gives us access to LOTS of information -- some of it helpful, research documented and also plenty of harmful, anecdotal information that may not pertain to your situtaion. Consider visiting the following websites for accurate health information:    www.vitamindcouncil.org/ : Info and ongoing research re Vitamin D    www.fairview.org : Up to date and easily searchable information on multiple topics.    www.medlineplus.gov : medication info, interactive tutorials, watch real surgeries online    www.cdc.gov : public health info, travel advisories, epidemics (H1N1)    www.shayan/std.org: current research re diagnosis, treatment and prevention of sexually contacted infections.    www.health.state.[place].us : [place] dept of heatlh, public health issues in [place], N1N1    www.familydoctor.org : good info from the Academy  of Family Physicians

## 2023-05-16 NOTE — LETTER
2023       RE: Kitty Ibarra  [address] [facility] [address]     Dear Colleague,    Thank you for referring your patient, Kitty Ibarra, to the [facility] [place] at [facility]. Please see a copy of my visit note below.    [facility]  [facility]  Annual Gynecology Visit      SUBJECTIVE   Kitty Ibarra is a 47 year old , LMP , here for an annual preventive exam. Patient doing well overall.  Just in need of updating preventative care. Still breastfeeding her youngest child but hoping to be done later this year.     Specific concerns today:   Notes that she's been having some episodic LLQ tenderness for the past month. States it's not painful and has not used anything to help manage it. She notes her symptoms started after an episode of gastroenteritis. She denies any associating factors. Her symptoms are not associated with her menstrual cycles.   States her cycles are regular (5 days q monthly). Denies dysmenorrhea or menorrhagia. She does have a history of fibroids and endorses mild bloating during menses. But they have not bothered her for years.      Gynecologic History  No LMP recorded.   Menstrual History:      2017     2:32 PM 2017    10:00 AM 2020     4:20 PM   Menstrual History   LAST MENSTRUAL PERIOD  3/1/2017 2020   Menarche Age 15 years     Period Cycle (Days) 30-31 days     Period Duration (Days) 3 days     Method of Contraception Condoms     Period Pattern Regular     Menstrual Flow Light     Menstrual Control Thin pad;Tampon     Dysmenorrhea None     Reviewed Today Yes     Comments without BC       Current contraception: Condoms  Desires pregnancy within 12 months: No  Number of partners in last year: 1    Denies history of STI  Lab Results   Component Value Date    PAP NIL 10/13/2014      History of abnormal Pap smear: No  HPV  vaccine none     Obstetric History  OB History    Para Term  AB Living   3 3 0 3 0 3   SAB IAB Ectopic Multiple Live Births   0 0 0 0 3      # Outcome Date GA Lbr Hunter/2nd Weight Sex Delivery Anes PTL Lv   3  18 36w2d  2.84 kg (6 lb 4.2 oz) M CS-LTranv Spinal N TONY      Name: FUNMILAYO RUIZ      Apgar1: 4  Apgar5: 9   2   36w0d  2.948 kg (6 lb 8 oz) M CS-LTranv   TONY      Birth Comments: Scheduled C/s due to prior C/s and myomectomy      Name: Keegan   1   36w0d  2.523 kg (5 lb 9 oz) M CS-LTranv   TONY      Birth Comments: Scheduled C/s due to prior myomectomy      Complications: Placenta previa      Name: Daniele      Obstetric Comments   No GDM, HTN, PPH.         Health Maintenance  Immunization History   Administered Date(s) Administered    COVID-19 Bivalent 12+ (Pfizer) 2022    COVID-19 MONOVALENT 12+ (Pfizer) 2021, 05/10/2021    HEPA 1996, 1997    HepB 1996, 1996, 1997    Influenza (H1N1) 2009    Influenza (IIV3) PF 10/25/2012, 10/16/2013, 2013, 2015    Influenza Vaccine >6 months (Alfuria,Fluzone) 2017    Rabavert 1996, 1996, 1996    TD,PF 7+ (Tenivac) 2014    TDAP (Adacel,Boostrix) 2006    TDAP Vaccine (Boostrix) 2017    Typhoid IM 2005, 2014    Yellow Fever 1996, 2014     Health Maintenance   Topic Date Due    ADVANCE CARE PLANNING  Never done    MAMMO SCREENING  Never done    COLORECTAL CANCER SCREENING  Never done    HEPATITIS C SCREENING  Never done    PAP  10/13/2017    YEARLY PREVENTIVE VISIT  2018    LIPID  2020    DTAP/TDAP/TD IMMUNIZATION (4 - Td or Tdap) 2027    HIV SCREENING  Completed    PHQ-2 (once per calendar year)  Completed    INFLUENZA VACCINE  Completed    HEPATITIS B IMMUNIZATION  Completed    COVID-19 Vaccine  Completed    Pneumococcal Vaccine: Pediatrics (0 to 5 Years) and At-Risk Patients (6 to 64  Years)  Aged Out    IPV IMMUNIZATION  Aged Out    MENINGITIS IMMUNIZATION  Aged Out     Lab Results   Component Value Date    CHOL 170.0 2015     Lab Results   Component Value Date    HDL 58.0 2015     Lab Results   Component Value Date    .0 2015     10/25/2012     Lab Results   Component Value Date    TSH 1.28 2017       Colonoscopy - due now, h/o paternal grandmother with colon cancer    Mammogram - due now, h/e patient still breastfeeding      Past Medical History  Past Medical History:   Diagnosis Date    NO ACTIVE PROBLEMS        Past Surgical History  Past Surgical History:   Procedure Laterality Date    BIOPSY BREAST      benign     SECTION  ,      SECTION N/A 2018    Procedure:  SECTION;  Repeat  Section ;  Surgeon: Karen Nieves MD;  [facility]    MYOMECTOMY UTERUS  2006    Uterine fibroids       Medications  No current outpatient medications on file.     No current facility-administered medications for this visit.       Allergies     Allergies   Allergen Reactions    Nkda [No Known Drug Allergy]        Social History  Social History     Socioeconomic History    Marital status:      Spouse name: Ender    Number of children: 2    Years of education: Not on file    Highest education level: Not on file   Occupational History    Occupation:      Employer: UNKNOWN     Comment: law firm-pt time   Tobacco Use    Smoking status: Never    Smokeless tobacco: Never   Vaping Use    Vaping status: Not on file   Substance and Sexual Activity    Alcohol use: No    Drug use: No    Sexual activity: Yes     Partners: Male     Birth control/protection: Condom   Other Topics Concern     Service No    Blood Transfusions No    Caffeine Concern No    Occupational Exposure No    Hobby Hazards No    Sleep Concern No    Stress Concern Yes    Weight Concern Yes    Special Diet No    Back Care No     "Exercise Yes    Bike Helmet No    Seat Belt No    Self-Exams No    Parent/sibling w/ CABG, MI or angioplasty before 65F 55M? Not Asked   Social History Narrative    How much exercise per week? Yoga regularly    How much calcium per day?Food       How much caffeine per day? none    How much vitamin D per day? prenatal    Do you/your family wear seatbelts?  Yes    Do you/your family use safety helmets? Yes    Do you/your family use sunscreen? Yes    Do you/your family keep firearms in the home? No    Do you/your family have a smoke detector(s)? Yes        Debbie Amado, [facility] 7/27/17         Social Determinants of Health     Financial Resource Strain: Not on file   Food Insecurity: Not on file   Transportation Needs: Not on file   Physical Activity: Not on file   Stress: Not on file   Social Connections: Not on file   Intimate Partner Violence: Not on file   Housing Stability: Not on file       Family History  Family History   Problem Relation Age of Onset    Glaucoma Mother     Colon Cancer Paternal Grandmother     Abdominal Aortic Aneurysm Maternal Grandmother     Alzheimer Disease Maternal Grandfather     Heart Disease Maternal Grandfather     Heart Disease Paternal Grandfather     Stomach Cancer Paternal Grandfather     Family History Negative No family hx of      No family history of breast, uterine, ovarian or colon cancer.    Review of Systems  ROS negative unless stated in HPI    OBJECTIVE   Ht 1.575 m (5' 2\")   BMI 20.12 kg/m    Gen: NAD, resting comfortably  CV: well perfused, no LE edema  RR: normal respiratory rate and effort  Abd: soft, non-tender, non-distended     Pelvic:  Normal appearing external female genitalia. Normal hair distribution. Vagina is without lesions. No vaginal discharge. Cervix multiparous, no lesions, no cervical motion tenderness. Uterus is enlarged, about 14 week size and globular with a right sided fibroid palpable, about 4 x 4 cm.    ASSESSMENT   Kitty Ibarra is a 47 " year old , annual preventive exam within normal limits.  Known h/o fibroids with prior myomectomy and caesarean deliveries due to that history.      PLAN     # LLQ tenderness   # H/o Uterine fibroids   - Low suspicion for gynecological etiology for LLQ tenderness. However patient with known history of uterine fibroids that are palpable on exam.  Discussed concerning s/sxs of uterine fibroids. Patient has not had pelvic US since pregnancy. Will plan to get repeat US to re-evaluate fibroids, however discussed no need for further intervention at this time given patient is asymptomatic. Discussed that fibroids will shrink somewhat with menopause and won't likely cause symptoms if she is not having any at this time.  If having significant symptoms before than can reconsider medical or surgical interventions.     # Annual visit   - Pap smear collected today. Results pending   - Mammogram ordered. Discussed with patient to wait 4 months s/p breast feeding before completing.   - Colonoscopy ordered.   - Labs: hbA1c, lipids, and thyroid ordered. Will follow up results   - Immunizations: UTD  - Recommended 7169-2333 U Vitamin D daily    RTC in one year for annual exam or with concerns.     Staffed with Dr. Artur Damico MD, MPH, MS (PGY1)   Obstetrics, Gynecology & Women's Health   Resident, PGY-1  2023 2:00 PM     The patient was seen and examined with Dr. Damico.  I have reviewed and edited the above note.     Omaira Siddiqui MD, FACOG

## 2023-05-18 LAB
BKR LAB AP GYN ADEQUACY: NORMAL
BKR LAB AP GYN INTERPRETATION: NORMAL
BKR LAB AP HPV REFLEX: NORMAL
BKR LAB AP LMP: NORMAL
BKR LAB AP PREVIOUS ABNORMAL: NORMAL
PATH REPORT.COMMENTS IMP SPEC: NORMAL
PATH REPORT.COMMENTS IMP SPEC: NORMAL
PATH REPORT.RELEVANT HX SPEC: NORMAL

## 2023-05-22 LAB
HUMAN PAPILLOMA VIRUS 16 DNA: NEGATIVE
HUMAN PAPILLOMA VIRUS 18 DNA: NEGATIVE
HUMAN PAPILLOMA VIRUS FINAL DIAGNOSIS: NORMAL
HUMAN PAPILLOMA VIRUS OTHER HR: NEGATIVE

## 2023-06-20 ENCOUNTER — LAB (OUTPATIENT)
Dept: LAB | Facility: CLINIC | Age: 48
End: 2023-06-20
Payer: COMMERCIAL

## 2023-06-20 DIAGNOSIS — Z13.29 SCREENING FOR THYROID DISORDER: ICD-10-CM

## 2023-06-20 DIAGNOSIS — Z13.1 SCREENING FOR DIABETES MELLITUS: ICD-10-CM

## 2023-06-20 DIAGNOSIS — Z13.220 SCREENING FOR LIPOID DISORDERS: ICD-10-CM

## 2023-06-20 DIAGNOSIS — Z00.00 VISIT FOR PREVENTIVE HEALTH EXAMINATION: ICD-10-CM

## 2023-06-20 LAB
CHOLEST SERPL-MCNC: 172 MG/DL
FASTING STATUS PATIENT QL REPORTED: YES
GLUCOSE SERPL-MCNC: 97 MG/DL (ref 70–99)
HBA1C MFR BLD: 5.5 %
HDLC SERPL-MCNC: 56 MG/DL
LDLC SERPL CALC-MCNC: 107 MG/DL
NONHDLC SERPL-MCNC: 116 MG/DL
TRIGL SERPL-MCNC: 45 MG/DL
TSH SERPL DL<=0.005 MIU/L-ACNC: 1.37 UIU/ML (ref 0.3–4.2)

## 2023-06-20 PROCEDURE — 83036 HEMOGLOBIN GLYCOSYLATED A1C: CPT

## 2023-06-20 PROCEDURE — 36415 COLL VENOUS BLD VENIPUNCTURE: CPT

## 2023-06-20 PROCEDURE — 84443 ASSAY THYROID STIM HORMONE: CPT

## 2023-06-20 PROCEDURE — 82947 ASSAY GLUCOSE BLOOD QUANT: CPT

## 2023-06-20 PROCEDURE — 80061 LIPID PANEL: CPT

## 2024-08-11 ENCOUNTER — HEALTH MAINTENANCE LETTER (OUTPATIENT)
Age: 49
End: 2024-08-11

## 2025-04-12 ENCOUNTER — HEALTH MAINTENANCE LETTER (OUTPATIENT)
Age: 50
End: 2025-04-12

## 2025-08-16 ENCOUNTER — HEALTH MAINTENANCE LETTER (OUTPATIENT)
Age: 50
End: 2025-08-16

## (undated) DEVICE — DRAPE SETUP C-SECTION INVISISHIELD 54X90" DYNJE5600

## (undated) DEVICE — PACK C-SECTION LF PL15OTA83B

## (undated) DEVICE — PREP CHLORAPREP 26ML TINTED ORANGE  260815

## (undated) DEVICE — STOCKING SLEEVE COMPRESSION CALF LG

## (undated) DEVICE — CATH TRAY FOLEY 16FR SILICONE 907416

## (undated) DEVICE — SU MONOCRYL 0 CTB-1 36" YB946

## (undated) DEVICE — SUCTION CANISTER MEDIVAC LINER 1500ML W/LID 65651-515

## (undated) DEVICE — ESU GROUND PAD UNIVERSAL W/O CORD

## (undated) DEVICE — SU VICRYL 4-0 KS 27" UND J662H

## (undated) DEVICE — GLOVE PROTEXIS BLUE W/NEU-THERA 7.5  2D73EB75

## (undated) DEVICE — SU VICRYL 0 CT-1 36" J346H

## (undated) DEVICE — STRAP KNEE/BODY 31143004

## (undated) DEVICE — GLOVE ESTEEM POWDER FREE SMT 7.5  2D72PT75

## (undated) DEVICE — SOL NACL 0.9% IRRIG 1000ML BOTTLE 07138-09

## (undated) DEVICE — BASIN SET MAJOR

## (undated) DEVICE — SOL WATER IRRIG 1000ML BOTTLE 07139-09

## (undated) RX ORDER — EPHEDRINE SULFATE 50 MG/ML
INJECTION, SOLUTION INTRAMUSCULAR; INTRAVENOUS; SUBCUTANEOUS
Status: DISPENSED
Start: 2018-02-09

## (undated) RX ORDER — PHENYLEPHRINE HCL IN 0.9% NACL 1 MG/10 ML
SYRINGE (ML) INTRAVENOUS
Status: DISPENSED
Start: 2018-02-09

## (undated) RX ORDER — ONDANSETRON 2 MG/ML
INJECTION INTRAMUSCULAR; INTRAVENOUS
Status: DISPENSED
Start: 2018-02-09